# Patient Record
Sex: FEMALE | Race: WHITE | NOT HISPANIC OR LATINO | Employment: OTHER | URBAN - METROPOLITAN AREA
[De-identification: names, ages, dates, MRNs, and addresses within clinical notes are randomized per-mention and may not be internally consistent; named-entity substitution may affect disease eponyms.]

---

## 2017-04-06 ENCOUNTER — TELEPHONE (OUTPATIENT)
Dept: INTERNAL MEDICINE | Facility: CLINIC | Age: 82
End: 2017-04-06

## 2017-04-06 ENCOUNTER — HOSPITAL ENCOUNTER (OUTPATIENT)
Dept: RADIOLOGY | Facility: HOSPITAL | Age: 82
Discharge: HOME | End: 2017-04-06
Attending: INTERNAL MEDICINE
Payer: MEDICARE

## 2017-04-06 ENCOUNTER — OFFICE VISIT (OUTPATIENT)
Dept: INTERNAL MEDICINE | Facility: CLINIC | Age: 82
End: 2017-04-06
Payer: MEDICARE

## 2017-04-06 VITALS
BODY MASS INDEX: 31.79 KG/M2 | HEIGHT: 63 IN | DIASTOLIC BLOOD PRESSURE: 84 MMHG | TEMPERATURE: 98 F | RESPIRATION RATE: 20 BRPM | SYSTOLIC BLOOD PRESSURE: 146 MMHG | HEART RATE: 89 BPM | WEIGHT: 179.44 LBS

## 2017-04-06 DIAGNOSIS — I35.0 NONRHEUMATIC AORTIC VALVE STENOSIS: ICD-10-CM

## 2017-04-06 DIAGNOSIS — I10 ESSENTIAL HYPERTENSION: ICD-10-CM

## 2017-04-06 DIAGNOSIS — I77.9 BILATERAL CAROTID ARTERY DISEASE: ICD-10-CM

## 2017-04-06 DIAGNOSIS — R07.89 OTHER CHEST PAIN: ICD-10-CM

## 2017-04-06 DIAGNOSIS — R05.9 COUGH: ICD-10-CM

## 2017-04-06 DIAGNOSIS — J84.89 BOOP (BRONCHIOLITIS OBLITERANS WITH ORGANIZING PNEUMONIA): Primary | ICD-10-CM

## 2017-04-06 DIAGNOSIS — J84.89 BOOP (BRONCHIOLITIS OBLITERANS WITH ORGANIZING PNEUMONIA): ICD-10-CM

## 2017-04-06 PROCEDURE — 71020 XR CHEST PA AND LATERAL: CPT | Mod: TC,PO

## 2017-04-06 PROCEDURE — 93010 ELECTROCARDIOGRAM REPORT: CPT | Mod: ,,, | Performed by: INTERNAL MEDICINE

## 2017-04-06 PROCEDURE — 71020 XR CHEST PA AND LATERAL: CPT | Mod: 26,,, | Performed by: RADIOLOGY

## 2017-04-06 PROCEDURE — 99999 PR PBB SHADOW E&M-EST. PATIENT-LVL III: CPT | Mod: PBBFAC,,, | Performed by: INTERNAL MEDICINE

## 2017-04-06 PROCEDURE — 99214 OFFICE O/P EST MOD 30 MIN: CPT | Mod: S$PBB,,, | Performed by: INTERNAL MEDICINE

## 2017-04-06 NOTE — TELEPHONE ENCOUNTER
----- Message from Madelyn Becerra sent at 4/6/2017 11:47 AM CDT -----  Contact: daughter ramon  call  [phone]  Daughter is calling to give you the name of the medication that she is taking  repatha 140mg

## 2017-04-06 NOTE — PROGRESS NOTES
Subjective:       Patient ID: Damari Grant is a 85 y.o. female.    Chief Complaint: Cough (concerned she might have pneumonia)  HISTORY OF PRESENT ILLNESS:  The patient came in with her daughter for a cough   that began about a week ago and following that right lower back pain and a   feeling of tightness, she really says she does not have pain and then over the   last couple of days, she has had left anterior chest pain that comes and goes.    It is not severe and she feels that maybe from the coughing.  She has had   coronary disease in the past.  The patient is not coughing right now.  She feels   like this was a cold.  Her  also is sick.  She has had no sputum   production, no shortness of breath.    PHYSICAL EXAMINATION:  VITAL SIGNS:  Normal.  SKIN:  Shows no abnormality.  HEENT:  Clear.  CHEST:  Clear.  She has tenderness in the costochondral margins 3 and 4 on the   left that is consistent with her chest pain.  HEART:  She has no gallop, but she does have a grade 2 aortic outflow murmur and   she has carotid bruits, although it might be transmitted murmur, but she has   had an abnormal carotid ultrasound.    IMPRESSION:  1.  Recent viral upper respiratory tract infection.  2.  Low back pain, probably secondary to coughing.  There was no pain on the   examination I did.  3.  Chest pain, appears to be costochondritis from the coughing.  4.  Aortic stenosis.  5.  Carotid artery disease.  6.  History of bronchiolitis obliterans organizing pneumonia.    PLAN:  Did an EKG on her, which is normal.  She is getting a chest x-ray, which   is back showing no new infiltrate.  I did not change her medication.  I advised   her just to use over-the-counter medications for this.      JULIO CESAR/ADDIE  dd: 04/06/2017 13:52:37 (CDT)  td: 04/07/2017 07:11:07 (CDT)  Doc ID   #[id number]  Job ID #[id number]    CC:     Dict [id number]  HPI  Review of Systems   Constitutional: Negative.    HENT: Negative for congestion, hearing loss, sinus  pressure, sneezing, sore throat and voice change.    Eyes: Negative for visual disturbance.   Respiratory: Positive for cough. Negative for chest tightness and shortness of breath.    Cardiovascular: Positive for chest pain. Negative for palpitations and leg swelling.   Gastrointestinal: Negative.    Genitourinary: Negative for difficulty urinating, dysuria, flank pain, frequency, hematuria, menstrual problem, pelvic pain, urgency, vaginal bleeding and vaginal discharge.   Musculoskeletal: Positive for back pain. Negative for neck pain and neck stiffness.   Skin: Negative.    Neurological: Negative.  Negative for dizziness, seizures, light-headedness, numbness and headaches.   Psychiatric/Behavioral: Negative for agitation, behavioral problems, confusion and sleep disturbance. The patient is not nervous/anxious.        Objective:      Physical Exam   Constitutional: She is oriented to person, place, and time. She appears well-developed and well-nourished.   Eyes: EOM are normal. Pupils are equal, round, and reactive to light.   Neck: Normal range of motion. Neck supple. No JVD present. No thyromegaly present.   Cardiovascular: Normal rate, regular rhythm, normal heart sounds and intact distal pulses.  Exam reveals no gallop.    No murmur heard.  Pulmonary/Chest: Breath sounds normal. She has no wheezes. She has no rales.   Abdominal: Soft. Bowel sounds are normal. She exhibits no mass. There is no tenderness.   Musculoskeletal: Normal range of motion.   Lymphadenopathy:     She has no cervical adenopathy.   Neurological: She is alert and oriented to person, place, and time. She has normal reflexes. No cranial nerve deficit.   Skin: No rash noted. No erythema.   Psychiatric: She has a normal mood and affect. Judgment normal.       Assessment:       1. BOOP (bronchiolitis obliterans with organizing pneumonia)    2. Cough    3. Other chest pain    4. Essential hypertension    5. Bilateral carotid artery disease    6.  Nonrheumatic aortic valve stenosis        Plan:

## 2017-04-06 NOTE — TELEPHONE ENCOUNTER
She gets repatha injection every other week for cholesterol.  They are working on getting her in a program to cover the cost.

## 2017-04-18 ENCOUNTER — TELEPHONE (OUTPATIENT)
Dept: INTERNAL MEDICINE | Facility: CLINIC | Age: 82
End: 2017-04-18

## 2017-04-18 NOTE — TELEPHONE ENCOUNTER
----- Message from Abraham Nugent sent at 4/18/2017  7:03 AM CDT -----  Contact: Pt at [phone]  Pt requesting a call back regarding letter she received in mail from Dr. Ya.

## 2017-07-20 ENCOUNTER — TELEPHONE (OUTPATIENT)
Dept: INTERNAL MEDICINE | Facility: CLINIC | Age: 82
End: 2017-07-20

## 2017-07-20 NOTE — TELEPHONE ENCOUNTER
----- Message from Jaci Shine sent at 7/20/2017  1:59 PM CDT -----  Contact: self/[phone]  Patient called in regards needing to talk with Nikky (did not stated what for). Please call and advise.         Thank you!!!

## 2017-07-20 NOTE — TELEPHONE ENCOUNTER
Patient saw eye dr and said may h ave damage from diabetes or yuliana ypert.  She does have history of hypert.  She is wondering if ever been checked for diabetes.  I told her yearly and last test was normal.  She is not having any symptoms like she may have diabetes.    She will follow up with eye dr in a month and see us in September for yearly.

## 2017-08-02 ENCOUNTER — TELEPHONE (OUTPATIENT)
Dept: INTERNAL MEDICINE | Facility: CLINIC | Age: 82
End: 2017-08-02

## 2017-08-02 DIAGNOSIS — I10 ESSENTIAL HYPERTENSION: ICD-10-CM

## 2017-08-02 DIAGNOSIS — E78.9 LIPID DISORDER: ICD-10-CM

## 2017-08-02 DIAGNOSIS — Z00.00 ANNUAL PHYSICAL EXAM: Primary | ICD-10-CM

## 2017-08-02 NOTE — TELEPHONE ENCOUNTER
----- Message from Marly Spencer sent at 8/2/2017  7:50 AM CDT -----  Contact: fyi  Doctor appointment and lab have been scheduled.  Please link lab orders to the lab appointment.  Date of doctor appointment:  09/25/17  Physical or EP:  epp  Date of lab appointment:  09/25/17  Comments:

## 2017-09-25 ENCOUNTER — LAB VISIT (OUTPATIENT)
Dept: LAB | Facility: HOSPITAL | Age: 82
End: 2017-09-25
Attending: INTERNAL MEDICINE
Payer: MEDICARE

## 2017-09-25 ENCOUNTER — OFFICE VISIT (OUTPATIENT)
Dept: INTERNAL MEDICINE | Facility: CLINIC | Age: 82
End: 2017-09-25
Payer: MEDICARE

## 2017-09-25 VITALS
WEIGHT: 178.13 LBS | HEART RATE: 66 BPM | HEIGHT: 63 IN | SYSTOLIC BLOOD PRESSURE: 138 MMHG | TEMPERATURE: 98 F | DIASTOLIC BLOOD PRESSURE: 82 MMHG | BODY MASS INDEX: 31.56 KG/M2

## 2017-09-25 DIAGNOSIS — I10 ESSENTIAL HYPERTENSION: Primary | ICD-10-CM

## 2017-09-25 DIAGNOSIS — E78.9 LIPID DISORDER: ICD-10-CM

## 2017-09-25 DIAGNOSIS — I77.9 BILATERAL CAROTID ARTERY DISEASE: ICD-10-CM

## 2017-09-25 DIAGNOSIS — R20.0 ARM NUMBNESS LEFT: ICD-10-CM

## 2017-09-25 DIAGNOSIS — I10 ESSENTIAL HYPERTENSION: ICD-10-CM

## 2017-09-25 DIAGNOSIS — J84.89 BOOP (BRONCHIOLITIS OBLITERANS WITH ORGANIZING PNEUMONIA): ICD-10-CM

## 2017-09-25 LAB
ALBUMIN SERPL BCP-MCNC: 3.6 G/DL
ALP SERPL-CCNC: 65 U/L
ALT SERPL W/O P-5'-P-CCNC: 16 U/L
ANION GAP SERPL CALC-SCNC: 8 MMOL/L
AST SERPL-CCNC: 16 U/L
BASOPHILS # BLD AUTO: 0.02 K/UL
BASOPHILS NFR BLD: 0.4 %
BILIRUB SERPL-MCNC: 1.1 MG/DL
BUN SERPL-MCNC: 15 MG/DL
CALCIUM SERPL-MCNC: 9.6 MG/DL
CHLORIDE SERPL-SCNC: 104 MMOL/L
CHOLEST SERPL-MCNC: 159 MG/DL
CHOLEST/HDLC SERPL: 2.8 {RATIO}
CO2 SERPL-SCNC: 27 MMOL/L
CREAT SERPL-MCNC: 0.8 MG/DL
DIFFERENTIAL METHOD: NORMAL
EOSINOPHIL # BLD AUTO: 0.1 K/UL
EOSINOPHIL NFR BLD: 2.5 %
ERYTHROCYTE [DISTWIDTH] IN BLOOD BY AUTOMATED COUNT: 13.1 %
EST. GFR  (AFRICAN AMERICAN): >60 ML/MIN/1.73 M^2
EST. GFR  (NON AFRICAN AMERICAN): >60 ML/MIN/1.73 M^2
GLUCOSE SERPL-MCNC: 114 MG/DL
HCT VFR BLD AUTO: 44.1 %
HDLC SERPL-MCNC: 56 MG/DL
HDLC SERPL: 35.2 %
HGB BLD-MCNC: 14.5 G/DL
LDLC SERPL CALC-MCNC: 79.8 MG/DL
LYMPHOCYTES # BLD AUTO: 1.7 K/UL
LYMPHOCYTES NFR BLD: 33.9 %
MCH RBC QN AUTO: 29.7 PG
MCHC RBC AUTO-ENTMCNC: 32.9 G/DL
MCV RBC AUTO: 90 FL
MONOCYTES # BLD AUTO: 0.4 K/UL
MONOCYTES NFR BLD: 7.6 %
NEUTROPHILS # BLD AUTO: 2.8 K/UL
NEUTROPHILS NFR BLD: 55.4 %
NONHDLC SERPL-MCNC: 103 MG/DL
PLATELET # BLD AUTO: 228 K/UL
PMV BLD AUTO: 12.4 FL
POTASSIUM SERPL-SCNC: 3.6 MMOL/L
PROT SERPL-MCNC: 7.2 G/DL
RBC # BLD AUTO: 4.88 M/UL
SODIUM SERPL-SCNC: 139 MMOL/L
T4 FREE SERPL-MCNC: 0.88 NG/DL
TRIGL SERPL-MCNC: 116 MG/DL
TSH SERPL DL<=0.005 MIU/L-ACNC: 2.98 UIU/ML
WBC # BLD AUTO: 5.1 K/UL

## 2017-09-25 PROCEDURE — 80061 LIPID PANEL: CPT

## 2017-09-25 PROCEDURE — 84439 ASSAY OF FREE THYROXINE: CPT

## 2017-09-25 PROCEDURE — 99213 OFFICE O/P EST LOW 20 MIN: CPT | Mod: PBBFAC,PO | Performed by: INTERNAL MEDICINE

## 2017-09-25 PROCEDURE — 99999 PR PBB SHADOW E&M-EST. PATIENT-LVL III: CPT | Mod: PBBFAC,,, | Performed by: INTERNAL MEDICINE

## 2017-09-25 PROCEDURE — 36415 COLL VENOUS BLD VENIPUNCTURE: CPT | Mod: PO

## 2017-09-25 PROCEDURE — 1159F MED LIST DOCD IN RCRD: CPT | Mod: ,,, | Performed by: INTERNAL MEDICINE

## 2017-09-25 PROCEDURE — 84443 ASSAY THYROID STIM HORMONE: CPT

## 2017-09-25 PROCEDURE — 1126F AMNT PAIN NOTED NONE PRSNT: CPT | Mod: ,,, | Performed by: INTERNAL MEDICINE

## 2017-09-25 PROCEDURE — 99214 OFFICE O/P EST MOD 30 MIN: CPT | Mod: S$PBB,,, | Performed by: INTERNAL MEDICINE

## 2017-09-25 PROCEDURE — 80053 COMPREHEN METABOLIC PANEL: CPT

## 2017-09-25 PROCEDURE — 85025 COMPLETE CBC W/AUTO DIFF WBC: CPT

## 2017-09-25 NOTE — PROGRESS NOTES
Subjective:       Patient ID: Damari Grant is a 86 y.o. female.    Chief Complaint: Annual Exam  HISTORY OF PRESENT ILLNESS:  An 86-year-old white female comes in with her   daughter who stayed with her through the exam.  The only real complaint she has   is burning in her left arm for the last week without any history of injury.  She   has had problems in the past with numbness in her left arm and when I asked   her, she said that she does still have that on occasion.  The patient had a mole   removed from her left cheek by Dr. Bailey recently.  She does not have the   report of the results.  She sees a cardiologist at [facility], Dr. Langley   and had thorough workup to see whether coronary disease is stable and it was.    He told her arm problems are not from her heart.  She also sees ENT, Dr. Mosqueda.  She used to see gynecologist but has not for the last two years.  The   patient in addition to her current medicines are on Praluent for hyperlipidemia   because she does not tolerate statins.    The patient had lab work done on the day of his visit.  She does not have   results.    PHYSICAL EXAMINATION:  VITAL SIGNS:  Normal.  SKIN:  Fair, healthy, slightly irregular.  She has a scab on her left chin from   where the molars are removed.  HEENT:  Clear.  She has hearing aids.  NECK:  Shows no stiffness or adenopathy.  CHEST:  Clear with good breath sounds.  There is no wheezing, rhonchi, or rales.  HEART:  There is grade 3 aortic outflow murmur that radiates.  ABDOMEN:  Obese, nontender, no organomegaly.  EXTREMITIES:  Show normal muscles, joints, pulses.  No edema.  NEUROLOGIC:  Normal.  Her left upper arm, she has intact sensation, burning   increases a little bit by rotation of her neck towards that side.    IMPRESSION:  1.  Left arm burning, likely from an irritated nerve in her neck.  I have told   her to try ibuprofen for that.  2.  Known coronary artery disease, apparently stable.  3.   Hyperlipidemia on injectable medication.  4.  Recent removal of left mole on her cheek, pending.  5.  Aortic stenosis.  I will let her know when we get the results of the lab and   lab should be sent to Dr. Langley.      JULIO CESAR/ADDIE  dd: 09/25/2017 09:19:22 (CDT)  td: 09/25/2017 22:32:29 (CDT)  Doc ID   #[id number]  Job ID #[id number]    CC:     Dict [id number]  HPI  Review of Systems   Constitutional: Positive for appetite change and fatigue.   HENT: Negative for congestion, hearing loss, sinus pressure, sneezing, sore throat and voice change.    Eyes: Negative for visual disturbance.   Respiratory: Positive for shortness of breath. Negative for cough and chest tightness.    Cardiovascular: Negative.  Negative for chest pain, palpitations and leg swelling.   Gastrointestinal: Negative.    Genitourinary: Negative for difficulty urinating, dysuria, flank pain, frequency, hematuria, menstrual problem, pelvic pain, urgency, vaginal bleeding and vaginal discharge.   Musculoskeletal: Negative.  Negative for neck pain and neck stiffness.   Skin: Negative.    Neurological: Positive for numbness. Negative for dizziness, seizures, light-headedness and headaches.   Psychiatric/Behavioral: Negative for agitation, behavioral problems, confusion and sleep disturbance. The patient is not nervous/anxious.        Objective:      Physical Exam   Constitutional: She is oriented to person, place, and time. She appears well-developed and well-nourished.   Eyes: EOM are normal. Pupils are equal, round, and reactive to light.   Neck: Normal range of motion. Neck supple. No JVD present. No thyromegaly present.   Cardiovascular: Normal rate, regular rhythm and intact distal pulses.  Exam reveals no gallop.    Murmur heard.  Pulmonary/Chest: Breath sounds normal. She has no wheezes. She has no rales.   Abdominal: Soft. Bowel sounds are normal. She exhibits no mass. There is no tenderness.   Musculoskeletal: Normal range of motion.   Lymphadenopathy:      She has no cervical adenopathy.   Neurological: She is alert and oriented to person, place, and time. She has normal reflexes. No cranial nerve deficit.   Skin: No rash noted. No erythema.   Psychiatric: She has a normal mood and affect. Judgment normal.       Assessment:       1. Essential hypertension    2. Lipid disorder    3. BOOP (bronchiolitis obliterans with organizing pneumonia)    4. Bilateral carotid artery disease    5. Arm numbness left        Plan:

## 2017-11-06 ENCOUNTER — OFFICE VISIT (OUTPATIENT)
Dept: INTERNAL MEDICINE | Facility: CLINIC | Age: 82
End: 2017-11-06
Payer: MEDICARE

## 2017-11-06 VITALS
HEART RATE: 80 BPM | WEIGHT: 176.13 LBS | RESPIRATION RATE: 16 BRPM | DIASTOLIC BLOOD PRESSURE: 77 MMHG | HEIGHT: 65 IN | BODY MASS INDEX: 29.34 KG/M2 | TEMPERATURE: 98 F | SYSTOLIC BLOOD PRESSURE: 120 MMHG

## 2017-11-06 DIAGNOSIS — I77.9 BILATERAL CAROTID ARTERY DISEASE: ICD-10-CM

## 2017-11-06 DIAGNOSIS — J40 BRONCHITIS: Primary | ICD-10-CM

## 2017-11-06 DIAGNOSIS — J84.89 BOOP (BRONCHIOLITIS OBLITERANS WITH ORGANIZING PNEUMONIA): ICD-10-CM

## 2017-11-06 DIAGNOSIS — J01.40 ACUTE NON-RECURRENT PANSINUSITIS: ICD-10-CM

## 2017-11-06 DIAGNOSIS — R13.14 PHARYNGOESOPHAGEAL DYSPHAGIA: ICD-10-CM

## 2017-11-06 PROCEDURE — 99214 OFFICE O/P EST MOD 30 MIN: CPT | Mod: S$PBB,,, | Performed by: INTERNAL MEDICINE

## 2017-11-06 PROCEDURE — 99213 OFFICE O/P EST LOW 20 MIN: CPT | Mod: PBBFAC,PO,25 | Performed by: INTERNAL MEDICINE

## 2017-11-06 PROCEDURE — 99999 PR PBB SHADOW E&M-EST. PATIENT-LVL III: CPT | Mod: PBBFAC,,, | Performed by: INTERNAL MEDICINE

## 2017-11-06 PROCEDURE — 96372 THER/PROPH/DIAG INJ SC/IM: CPT | Mod: PBBFAC,PO

## 2017-11-06 RX ORDER — AZITHROMYCIN 250 MG/1
TABLET, FILM COATED ORAL
Qty: 6 TABLET | Refills: 0 | Status: SHIPPED | OUTPATIENT
Start: 2017-11-06 | End: 2018-09-25

## 2017-11-06 RX ORDER — TRIAMCINOLONE ACETONIDE 40 MG/ML
40 INJECTION, SUSPENSION INTRA-ARTICULAR; INTRAMUSCULAR ONCE
Status: COMPLETED | OUTPATIENT
Start: 2017-11-06 | End: 2017-11-06

## 2017-11-06 RX ADMIN — TRIAMCINOLONE ACETONIDE 40 MG: 40 INJECTION, SUSPENSION INTRA-ARTICULAR; INTRAMUSCULAR at 08:11

## 2017-11-06 NOTE — PROGRESS NOTES
Subjective:       Patient ID: Damari Grant is a 86 y.o. female.    Chief Complaint: Cough and Back Pain (lower)  An 86-year-old white female well known to me, who has come in with three days of   congestion, shortness of breath, wheezing, with minimal sputum production.  The   patient has had a history in the past of BOOP and for that was followed by Dr. Barahona, who is a pulmonary doctor at [facility].  The patient has not   had any problems with that recently.  The patient also has a long history of   food occasionally sticking in her esophagus.  She has been evaluated by GI for   that.  It is giving her a little more problem now that she has got good   respiratory in.    PHYSICAL EXAMINATION:  VITAL SIGNS:  Normal including temperature.  SKIN:  Fair and healthy.  No rash.  HEENT:  Clear.  CHEST:  Clear.  I hear no wheezing or rales.  The heart rhythm is regular.  ABDOMEN:  Nontender.    IMPRESSION:  1.  Asthmatic bronchitis.  2.  History of BOOP.  3.  Nonrecurrent pansinusitis.  4.  Dysphagia, which she has gong to continue when she will see GI at [facility].  I have placed her on a Z-BOLA, gave her a shot of Kenalog 40 IM.  I   did not do any x-rays.      JDC/HN  dd: 11/19/2017 22:13:21 (CST)  td: 11/19/2017 23:59:34 (CST)  Doc ID   #[id number]  Job ID #[id number]    CC:     Dict  [id number]  HPI  Review of Systems   Constitutional: Negative.    HENT: Positive for congestion, postnasal drip, sinus pressure and sneezing. Negative for hearing loss, sore throat and voice change.    Eyes: Negative for visual disturbance.   Respiratory: Positive for cough and shortness of breath. Negative for chest tightness.    Cardiovascular: Negative.  Negative for chest pain, palpitations and leg swelling.   Gastrointestinal: Negative.    Genitourinary: Negative for difficulty urinating, dysuria, flank pain, frequency, hematuria, menstrual problem, pelvic pain, urgency, vaginal bleeding and vaginal discharge.    Musculoskeletal: Negative.  Negative for neck pain and neck stiffness.   Skin: Negative.    Neurological: Negative.  Negative for dizziness, seizures, light-headedness, numbness and headaches.   Psychiatric/Behavioral: Negative for agitation, behavioral problems, confusion and sleep disturbance. The patient is not nervous/anxious.        Objective:      Physical Exam   Constitutional: She is oriented to person, place, and time. She appears well-developed and well-nourished.   Eyes: EOM are normal. Pupils are equal, round, and reactive to light.   Neck: Normal range of motion. Neck supple. No JVD present. No thyromegaly present.   Cardiovascular: Normal rate, regular rhythm, normal heart sounds and intact distal pulses.  Exam reveals no gallop.    No murmur heard.  Pulmonary/Chest: Breath sounds normal. She has no wheezes. She has no rales.   Abdominal: Soft. Bowel sounds are normal. She exhibits no mass. There is no tenderness.   Musculoskeletal: Normal range of motion.   Lymphadenopathy:     She has no cervical adenopathy.   Neurological: She is alert and oriented to person, place, and time. She has normal reflexes. No cranial nerve deficit.   Skin: No rash noted. No erythema.   Psychiatric: She has a normal mood and affect. Judgment normal.       Assessment:       1. Bronchitis    2. Acute non-recurrent pansinusitis    3. BOOP (bronchiolitis obliterans with organizing pneumonia)    4. Bilateral carotid artery disease    5. Pharyngoesophageal dysphagia        Plan:

## 2017-11-06 NOTE — PATIENT INSTRUCTIONS
Take over the counter Mucinex 1 pill twice daily  Over the counter Saline Nasal Spray (ex: Ocean brand)  Several times a day- 1 squirt in each nostril.  Push Fluids/ water    Take over the counter Mucinex 1 pill twice daily  Over the counter Saline Nasal Spray (ex: Ocean brand)  Several times a day- 1 squirt in each nostril.  Push Fluids/ water

## 2018-06-06 ENCOUNTER — TELEPHONE (OUTPATIENT)
Dept: INTERNAL MEDICINE | Facility: CLINIC | Age: 83
End: 2018-06-06

## 2018-06-06 DIAGNOSIS — E78.49 OTHER HYPERLIPIDEMIA: Primary | ICD-10-CM

## 2018-06-06 DIAGNOSIS — I10 ESSENTIAL HYPERTENSION: ICD-10-CM

## 2018-06-06 DIAGNOSIS — R73.09 ABNORMAL GLUCOSE: ICD-10-CM

## 2018-06-06 NOTE — TELEPHONE ENCOUNTER
----- Message from Sherry Baldwin sent at 6/5/2018  1:17 PM CDT -----  Doctor appointment and lab have been scheduled.  Please link lab orders to the lab appointment.  Date of doctor appointment: 10/1    Physical or EP:  Physical  Date of lab appointment:  9/24  Comments:

## 2018-06-21 ENCOUNTER — TELEPHONE (OUTPATIENT)
Dept: INTERNAL MEDICINE | Facility: CLINIC | Age: 83
End: 2018-06-21

## 2018-06-21 DIAGNOSIS — J84.89 BOOP (BRONCHIOLITIS OBLITERANS WITH ORGANIZING PNEUMONIA): Primary | ICD-10-CM

## 2018-06-21 DIAGNOSIS — M85.9 DISORDER OF BONE DENSITY AND STRUCTURE, UNSPECIFIED: ICD-10-CM

## 2018-06-21 DIAGNOSIS — M54.50 LOW BACK PAIN WITHOUT SCIATICA, UNSPECIFIED BACK PAIN LATERALITY, UNSPECIFIED CHRONICITY: ICD-10-CM

## 2018-06-21 NOTE — TELEPHONE ENCOUNTER
----- Message from Susy Roger sent at 6/21/2018  8:11 AM CDT -----  Contact: Pt [phone]  Pt would like a call back from the nurse regarding something she would like to discuss with Nikky.

## 2018-06-21 NOTE — TELEPHONE ENCOUNTER
She usually gets a bmd with dr ma/chevy.  She hasn't seen her in 2 years and wasn't planning on going back to her.    Had back problems recently and wondering if can get  another bmd.  Last one 11 years ago at [facility]    Ok to order? She wants to get done at our facility if you are ok with ordering.    Please advise.  I told her i'd let her know.    Thanks karan

## 2018-06-29 ENCOUNTER — APPOINTMENT (OUTPATIENT)
Dept: RADIOLOGY | Facility: CLINIC | Age: 83
End: 2018-06-29
Attending: INTERNAL MEDICINE
Payer: MEDICARE

## 2018-06-29 DIAGNOSIS — J84.89 BOOP (BRONCHIOLITIS OBLITERANS WITH ORGANIZING PNEUMONIA): ICD-10-CM

## 2018-06-29 DIAGNOSIS — M54.50 LOW BACK PAIN WITHOUT SCIATICA, UNSPECIFIED BACK PAIN LATERALITY, UNSPECIFIED CHRONICITY: ICD-10-CM

## 2018-06-29 DIAGNOSIS — M85.9 DISORDER OF BONE DENSITY AND STRUCTURE, UNSPECIFIED: ICD-10-CM

## 2018-06-29 PROCEDURE — 77081 DXA BONE DENSITY APPENDICULR: CPT | Mod: TC,PO

## 2018-06-29 PROCEDURE — 77081 DXA BONE DENSITY APPENDICULR: CPT | Mod: 26,,, | Performed by: INTERNAL MEDICINE

## 2018-07-06 ENCOUNTER — TELEPHONE (OUTPATIENT)
Dept: INTERNAL MEDICINE | Facility: CLINIC | Age: 83
End: 2018-07-06

## 2018-07-06 NOTE — TELEPHONE ENCOUNTER
Calling for bmd results done  6/29.  See results below:  What should I report?    Osteoporosis.    RECOMMENDATIONS of [facility]:    1.  Calcium 4550-2793 mg daily and vitamin D 800-1000 units daily, adequate exercise.    2.  If clinically appropriate, consider bisphosphonates (eg. alendronate, risedronate, zolendronic acid), denosumab, or teraparatide.    3.  Repeat BMD in 2 years    EXPLANATION OF RESULTS:    The T-score compares these results to the average bone density of a 20 year-old of the same gender. The Z-score compares this result to the average bone density to people of the same age, gender, and race. The amounts indicate the number of standard deviations above or below the mean.    * Osteoporosis is generally defined as having a T-score between less than or equal to -2.5.    * Osteopenia is generally defined as having a T-score between -1.0 and -2.5.    * The normal range is generally defined as having a t-score greater than or equal to -1.0.    * Calculated FRAX scores for fracture risk prediction may not be accurate in the setting of certain clinical factors such as pharmacologic therapy for osteoporosis, prior fragility fractures, high dose glucocorticoid use etc.      Electronically signed by: Elissa Resendiz MD  Date: 07/06/2018  Time: 08:42

## 2018-07-06 NOTE — TELEPHONE ENCOUNTER
----- Message from Toni Osuna sent at 7/6/2018  1:38 PM CDT -----  Contact: Self [phone]  Pt would like to speak to the nurse, did not want to give many details.

## 2018-07-07 NOTE — TELEPHONE ENCOUNTER
SHE HAS WEAKENED BONES, OSTEOPENIA.  sHE SHOULLD BE ON CALCIUMA ND VITAMIN d COMBINATION AS OUTLINED.  IT ALSO MIGHT BE WISE FOR HER TO GET ON THE ONCE MONTHLY FOSAMAX

## 2018-07-09 NOTE — TELEPHONE ENCOUNTER
I gave her dr's comments.    She says she tried fosamax years ago and didn't tolerate and stopped.  Doesn't remember problem.  I added to allergy list and told dr yung.    I also mailed results w/ recommendation high lighted.

## 2018-07-23 ENCOUNTER — TELEPHONE (OUTPATIENT)
Dept: INTERNAL MEDICINE | Facility: CLINIC | Age: 83
End: 2018-07-23

## 2018-07-23 NOTE — TELEPHONE ENCOUNTER
She is wondering if needed mammo .  I told her we will discuss that at visit in September since she is now 87.

## 2018-07-23 NOTE — TELEPHONE ENCOUNTER
----- Message from Cecy Danielle sent at 7/23/2018 11:13 AM CDT -----  Contact: self [phone]  Patient would like to speak to nurse, refuse to give details     Please advise

## 2018-07-29 ENCOUNTER — TELEPHONE (OUTPATIENT)
Dept: INTERNAL MEDICINE | Facility: CLINIC | Age: 83
End: 2018-07-29

## 2018-07-29 NOTE — TELEPHONE ENCOUNTER
Her bone density is somewhat low and she is classified as osteoporosis.  She should be on high dose vit D taking it bid and calcium hi potency daily.

## 2018-09-24 ENCOUNTER — LAB VISIT (OUTPATIENT)
Dept: LAB | Facility: HOSPITAL | Age: 83
End: 2018-09-24
Attending: INTERNAL MEDICINE
Payer: MEDICARE

## 2018-09-24 DIAGNOSIS — I10 ESSENTIAL HYPERTENSION: ICD-10-CM

## 2018-09-24 DIAGNOSIS — E78.49 OTHER HYPERLIPIDEMIA: ICD-10-CM

## 2018-09-24 DIAGNOSIS — R73.09 ABNORMAL GLUCOSE: ICD-10-CM

## 2018-09-24 LAB
ALBUMIN SERPL BCP-MCNC: 3.8 G/DL
ALP SERPL-CCNC: 61 U/L
ALT SERPL W/O P-5'-P-CCNC: 18 U/L
ANION GAP SERPL CALC-SCNC: 8 MMOL/L
AST SERPL-CCNC: 20 U/L
BASOPHILS # BLD AUTO: 0.03 K/UL
BASOPHILS NFR BLD: 0.6 %
BILIRUB SERPL-MCNC: 0.9 MG/DL
BUN SERPL-MCNC: 13 MG/DL
CALCIUM SERPL-MCNC: 10.2 MG/DL
CHLORIDE SERPL-SCNC: 105 MMOL/L
CHOLEST SERPL-MCNC: 165 MG/DL
CHOLEST/HDLC SERPL: 2.9 {RATIO}
CO2 SERPL-SCNC: 27 MMOL/L
CREAT SERPL-MCNC: 0.7 MG/DL
DIFFERENTIAL METHOD: ABNORMAL
EOSINOPHIL # BLD AUTO: 0.1 K/UL
EOSINOPHIL NFR BLD: 1.8 %
ERYTHROCYTE [DISTWIDTH] IN BLOOD BY AUTOMATED COUNT: 13.1 %
EST. GFR  (AFRICAN AMERICAN): >60 ML/MIN/1.73 M^2
EST. GFR  (NON AFRICAN AMERICAN): >60 ML/MIN/1.73 M^2
ESTIMATED AVG GLUCOSE: 105 MG/DL
GLUCOSE SERPL-MCNC: 104 MG/DL
HBA1C MFR BLD HPLC: 5.3 %
HCT VFR BLD AUTO: 43.9 %
HDLC SERPL-MCNC: 56 MG/DL
HDLC SERPL: 33.9 %
HGB BLD-MCNC: 13.8 G/DL
IMM GRANULOCYTES # BLD AUTO: 0.01 K/UL
IMM GRANULOCYTES NFR BLD AUTO: 0.2 %
LDLC SERPL CALC-MCNC: 84.8 MG/DL
LYMPHOCYTES # BLD AUTO: 1.6 K/UL
LYMPHOCYTES NFR BLD: 32.1 %
MCH RBC QN AUTO: 29 PG
MCHC RBC AUTO-ENTMCNC: 31.4 G/DL
MCV RBC AUTO: 92 FL
MONOCYTES # BLD AUTO: 0.4 K/UL
MONOCYTES NFR BLD: 8.2 %
NEUTROPHILS # BLD AUTO: 2.9 K/UL
NEUTROPHILS NFR BLD: 57.1 %
NONHDLC SERPL-MCNC: 109 MG/DL
NRBC BLD-RTO: 0 /100 WBC
PLATELET # BLD AUTO: 197 K/UL
PMV BLD AUTO: 12.9 FL
POTASSIUM SERPL-SCNC: 4.1 MMOL/L
PROT SERPL-MCNC: 7.1 G/DL
RBC # BLD AUTO: 4.76 M/UL
SODIUM SERPL-SCNC: 140 MMOL/L
T4 FREE SERPL-MCNC: 1 NG/DL
TRIGL SERPL-MCNC: 121 MG/DL
TSH SERPL DL<=0.005 MIU/L-ACNC: 3.4 UIU/ML
WBC # BLD AUTO: 5.01 K/UL

## 2018-09-24 PROCEDURE — 84443 ASSAY THYROID STIM HORMONE: CPT

## 2018-09-24 PROCEDURE — 83036 HEMOGLOBIN GLYCOSYLATED A1C: CPT

## 2018-09-24 PROCEDURE — 84439 ASSAY OF FREE THYROXINE: CPT

## 2018-09-24 PROCEDURE — 36415 COLL VENOUS BLD VENIPUNCTURE: CPT | Mod: PO

## 2018-09-24 PROCEDURE — 80053 COMPREHEN METABOLIC PANEL: CPT

## 2018-09-24 PROCEDURE — 85025 COMPLETE CBC W/AUTO DIFF WBC: CPT

## 2018-09-24 PROCEDURE — 80061 LIPID PANEL: CPT

## 2018-09-25 ENCOUNTER — OFFICE VISIT (OUTPATIENT)
Dept: INTERNAL MEDICINE | Facility: CLINIC | Age: 83
End: 2018-09-25
Payer: MEDICARE

## 2018-09-25 VITALS
DIASTOLIC BLOOD PRESSURE: 64 MMHG | SYSTOLIC BLOOD PRESSURE: 122 MMHG | TEMPERATURE: 99 F | WEIGHT: 175.06 LBS | HEART RATE: 78 BPM | HEIGHT: 62 IN | BODY MASS INDEX: 32.22 KG/M2

## 2018-09-25 DIAGNOSIS — R53.82 CHRONIC FATIGUE: Primary | ICD-10-CM

## 2018-09-25 DIAGNOSIS — I35.0 NONRHEUMATIC AORTIC VALVE STENOSIS: ICD-10-CM

## 2018-09-25 DIAGNOSIS — M54.50 LOW BACK PAIN WITHOUT SCIATICA, UNSPECIFIED BACK PAIN LATERALITY, UNSPECIFIED CHRONICITY: ICD-10-CM

## 2018-09-25 DIAGNOSIS — E78.9 LIPID DISORDER: ICD-10-CM

## 2018-09-25 DIAGNOSIS — J84.89 BOOP (BRONCHIOLITIS OBLITERANS WITH ORGANIZING PNEUMONIA): ICD-10-CM

## 2018-09-25 DIAGNOSIS — I77.9 BILATERAL CAROTID ARTERY DISEASE: ICD-10-CM

## 2018-09-25 DIAGNOSIS — I10 ESSENTIAL HYPERTENSION: ICD-10-CM

## 2018-09-25 PROCEDURE — 99214 OFFICE O/P EST MOD 30 MIN: CPT | Mod: S$PBB,,, | Performed by: INTERNAL MEDICINE

## 2018-09-25 PROCEDURE — 99213 OFFICE O/P EST LOW 20 MIN: CPT | Mod: PBBFAC,PO | Performed by: INTERNAL MEDICINE

## 2018-09-25 PROCEDURE — 99999 PR PBB SHADOW E&M-EST. PATIENT-LVL III: CPT | Mod: PBBFAC,,, | Performed by: INTERNAL MEDICINE

## 2018-09-25 RX ORDER — VITAMIN E 268 MG
400 CAPSULE ORAL DAILY
COMMUNITY
End: 2019-09-17

## 2018-09-25 NOTE — PROGRESS NOTES
Subjective:       Patient ID: Damari Grant is a 87 y.o. female.    Chief Complaint: Annual Exam (review labs)  The patient was seen for annual review.    HISTORY OF PRESENT ILLNESS:  The patient has had several problems since I had   last seen her.  She has been diagnosed as having a macular puckering by Dr. Boyer.  She has been seeing a chiropractor for right lower back pain, then went   to [facility] who referred her to Filippo Valadez for pain management.    She has required four shots since April and has now got reasonable relief of her   pain.    The patient complains of chronic tiredness, awakens tired.  She has bad dreams   and has for some time.  She also has had problems with pain in her shoulder on   the right side and is seeing [facility] for that.  She sees   gynecologist, Dr. Jefferson, Dr. Melendez for Cardiology, Dr. Shahid for ENT.    The patient has already seen Dr. Jefferson, had her bone density and mammogram   done.    The final complaint is left foot pain at the base of the second toe of the left   foot with walking, but not when she is off of that.    PHYSICAL EXAMINATION:  VITAL SIGNS:  Normal.  SKIN:  Fair and healthy.  HEENT:  Clear.  SKIN:  The skin is very dry and very fair.  NECK:  Shows no adenopathy or thyroid enlargement.  CHEST:  She has a bruit, but has a loud aortic murmur.  HEART:  There is a grade 3 aortic outflow murmur, which is old, radiating to her   neck.  CHEST:  Clear.  ABDOMEN:  Nontender without any organomegaly.  She is slightly obese.  EXTREMITIES:  Show normal muscles, joints, pulses except she has arthritic   changes consistent with her age.  NEUROLOGIC:  Appears normal.    IMPRESSION:  1.  Aortic stenosis.  2.  History of BOOP, currently not giving her any problems.  3.  Bilateral carotid disease.  4.  Hypertension, controlled.  5.  Lumbar radiculopathy, currently controlled with Pain Management.  6.  Aortic valve disease, seeing Cardiology.  7.   Plantar fasciitis, left second toe and I have told her to get inserts for   her shoes.  8.  Puckering of her macula seen by Dr. Boyer.  I will see her again if all is   well in one year.      JDC/HN  dd: 09/27/2018 21:52:17 (CDT)  td: 09/28/2018 11:19:51 (CDT)  Doc ID   #[id number]  Job ID #[id number]    CC:     Dict [id number]  HPI  Review of Systems   Constitutional: Positive for appetite change.   HENT: Negative.  Negative for congestion, hearing loss, sinus pressure, sneezing, sore throat and voice change.    Eyes: Positive for discharge. Negative for visual disturbance.   Respiratory: Negative.  Negative for cough, chest tightness and shortness of breath.    Cardiovascular: Negative.  Negative for chest pain, palpitations and leg swelling.   Gastrointestinal: Negative.    Endocrine: Negative.    Genitourinary: Negative for difficulty urinating, dysuria, flank pain, frequency, hematuria, menstrual problem, pelvic pain, urgency, vaginal bleeding and vaginal discharge.   Musculoskeletal: Positive for arthralgias. Negative for neck pain and neck stiffness.   Skin: Negative.    Allergic/Immunologic: Negative.    Neurological: Negative.  Negative for dizziness, seizures, light-headedness, numbness and headaches.   Hematological: Negative.    Psychiatric/Behavioral: Positive for agitation and sleep disturbance. Negative for behavioral problems and confusion. The patient is nervous/anxious.        Objective:      Physical Exam   Constitutional: She is oriented to person, place, and time. She appears well-developed and well-nourished.   Eyes: EOM are normal. Pupils are equal, round, and reactive to light.   Neck: Normal range of motion. Neck supple. No JVD present. No thyromegaly present.   Cardiovascular: Normal rate, regular rhythm and intact distal pulses. Exam reveals no gallop.   Murmur heard.  Pulmonary/Chest: Breath sounds normal. She has no wheezes. She has no rales.   Abdominal: Soft. Bowel sounds are normal. She exhibits  no mass. There is no tenderness.   Musculoskeletal: Normal range of motion. She exhibits tenderness.   Lymphadenopathy:     She has no cervical adenopathy.   Neurological: She is alert and oriented to person, place, and time. She has normal reflexes. No cranial nerve deficit.   Skin: No rash noted. No erythema. There is pallor.   Psychiatric: She has a normal mood and affect. Judgment normal.       Assessment:       1. Chronic fatigue    2. BOOP (bronchiolitis obliterans with organizing pneumonia)    3. Low back pain without sciatica, unspecified back pain laterality, unspecified chronicity    4. Essential hypertension    5. Bilateral carotid artery disease    6. Lipid disorder    7. Nonrheumatic aortic valve stenosis        Plan:

## 2019-01-08 ENCOUNTER — TELEPHONE (OUTPATIENT)
Dept: INTERNAL MEDICINE | Facility: CLINIC | Age: 84
End: 2019-01-08

## 2019-01-08 NOTE — TELEPHONE ENCOUNTER
She didn't want to keep wellness visit with ms. Corrie fulton and wanted our opinion.  I told her that is optional.   We cancelled because he is so hard to get in with appt.

## 2019-01-08 NOTE — TELEPHONE ENCOUNTER
----- Message from Kassandra Beltrán sent at 1/8/2019  3:44 PM CST -----  Contact: Pt Mobile/Home [phone]   Patient would like a call back from you. I asked her what was the call about? she didn't tell me she said she have a question for you.

## 2019-01-15 ENCOUNTER — OFFICE VISIT (OUTPATIENT)
Dept: INTERNAL MEDICINE | Facility: CLINIC | Age: 84
End: 2019-01-15
Payer: MEDICARE

## 2019-01-15 ENCOUNTER — TELEPHONE (OUTPATIENT)
Dept: INTERNAL MEDICINE | Facility: CLINIC | Age: 84
End: 2019-01-15

## 2019-01-15 ENCOUNTER — HOSPITAL ENCOUNTER (OUTPATIENT)
Dept: RADIOLOGY | Facility: HOSPITAL | Age: 84
Discharge: HOME | End: 2019-01-15
Attending: INTERNAL MEDICINE
Payer: MEDICARE

## 2019-01-15 VITALS
HEART RATE: 78 BPM | SYSTOLIC BLOOD PRESSURE: 122 MMHG | RESPIRATION RATE: 16 BRPM | BODY MASS INDEX: 28.76 KG/M2 | TEMPERATURE: 98 F | DIASTOLIC BLOOD PRESSURE: 59 MMHG | WEIGHT: 172.63 LBS | HEIGHT: 65 IN

## 2019-01-15 DIAGNOSIS — I65.23 BILATERAL CAROTID ARTERY STENOSIS: ICD-10-CM

## 2019-01-15 DIAGNOSIS — R68.81 EARLY SATIETY: ICD-10-CM

## 2019-01-15 DIAGNOSIS — J84.89 BOOP (BRONCHIOLITIS OBLITERANS WITH ORGANIZING PNEUMONIA): ICD-10-CM

## 2019-01-15 DIAGNOSIS — I35.0 NONRHEUMATIC AORTIC VALVE STENOSIS: ICD-10-CM

## 2019-01-15 DIAGNOSIS — E78.9 LIPID DISORDER: ICD-10-CM

## 2019-01-15 DIAGNOSIS — R10.30 LOWER ABDOMINAL PAIN: ICD-10-CM

## 2019-01-15 DIAGNOSIS — K59.01 SLOW TRANSIT CONSTIPATION: ICD-10-CM

## 2019-01-15 DIAGNOSIS — I10 ESSENTIAL HYPERTENSION: ICD-10-CM

## 2019-01-15 DIAGNOSIS — R68.81 EARLY SATIETY: Primary | ICD-10-CM

## 2019-01-15 PROCEDURE — 99999 PR PBB SHADOW E&M-EST. PATIENT-LVL III: CPT | Mod: PBBFAC,,, | Performed by: INTERNAL MEDICINE

## 2019-01-15 PROCEDURE — 74019 XR ABDOMEN FLAT AND ERECT: ICD-10-PCS | Mod: 26,,, | Performed by: RADIOLOGY

## 2019-01-15 PROCEDURE — 99214 OFFICE O/P EST MOD 30 MIN: CPT | Mod: S$PBB,,, | Performed by: INTERNAL MEDICINE

## 2019-01-15 PROCEDURE — 74019 RADEX ABDOMEN 2 VIEWS: CPT | Mod: 26,,, | Performed by: RADIOLOGY

## 2019-01-15 PROCEDURE — 74019 RADEX ABDOMEN 2 VIEWS: CPT | Mod: TC,PO

## 2019-01-15 PROCEDURE — 99999 PR PBB SHADOW E&M-EST. PATIENT-LVL III: ICD-10-PCS | Mod: PBBFAC,,, | Performed by: INTERNAL MEDICINE

## 2019-01-15 PROCEDURE — 99213 OFFICE O/P EST LOW 20 MIN: CPT | Mod: PBBFAC,25,PO | Performed by: INTERNAL MEDICINE

## 2019-01-15 PROCEDURE — 99214 PR OFFICE/OUTPT VISIT, EST, LEVL IV, 30-39 MIN: ICD-10-PCS | Mod: S$PBB,,, | Performed by: INTERNAL MEDICINE

## 2019-01-15 NOTE — TELEPHONE ENCOUNTER
----- Message from Kana Ya MD sent at 1/15/2019  2:45 PM CST -----  The abdominal films show nothing bad, likely this is constipation

## 2019-01-15 NOTE — PROGRESS NOTES
Subjective:       Patient ID: Damari Grant is a 87 y.o. female.    Chief Complaint: gi issues  HISTORY OF PRESENT ILLNESS:  An 87-year-old white female coming in with lower   abdominal pain on and off for the last year.  She also has had early satiety,   complaining at times that she gets full quickly.  She eats a fairly normal diet.    She has had a workup done by her GI doctor, Dr. Delaney, four to five years   ago for the same problem that was negative, but I do not have those results   handy in our chart.  The patient has had about a 2-pound weight loss in the last   14 months.  The patient was accompanied by a family member.  She does not look   ill.    PHYSICAL EXAMINATION:  VITAL SIGNS:  Normal.  SKIN:  Fair and healthy.  No rash.  HEENT:  Clear.  CHEST:  Clear.  HEART:  Regular.  ABDOMEN:  Not distended.  Bowel sounds are active.  She has some discomfort in   the very lower part of her abdomen.    IMPRESSION AND PLAN:  1.  Early satiety.  2.  Constipation, slow transit.  3.  Lower abdominal pain, likely related to the above.  4.  History of BOOP, but she appears to have recovered from that.  5.  Hypertension.  6.  Aortic stenosis, mild.    The patient had a blood work and an abdominal x-ray ordered.  Blood work showed   normal CBC, chemistries, amylase and sed rate and her abdominal studies were   unremarkable.  Following the studies, she was advised to get on MiraLax and a   high-fiber diet and if her problems persisted, to let me know and we can look   into this further.  The patient had upper endoscopy done by Dr. Delaney in 2014   showing mild inflammatory changes.  Colonoscopy showed diverticulosis,   otherwise normal.      JULIO CESAR/HN  dd: 01/23/2019 20:38:12 (CST)  td: 01/24/2019 11:51:09 (CST)  Doc ID   #[id number]  Job ID #[id number]    CC:     Dict [id number]  HPI  Review of Systems   Constitutional: Positive for appetite change and fatigue.   HENT: Negative for congestion, hearing loss, sinus pressure,  sneezing, sore throat and voice change.    Eyes: Negative for visual disturbance.   Respiratory: Negative for cough, chest tightness and shortness of breath.    Cardiovascular: Negative.  Negative for chest pain, palpitations and leg swelling.   Gastrointestinal: Positive for abdominal pain and constipation.   Genitourinary: Negative for difficulty urinating, dysuria, flank pain, frequency, hematuria, menstrual problem, pelvic pain, urgency, vaginal bleeding and vaginal discharge.   Musculoskeletal: Negative.  Negative for neck pain and neck stiffness.   Skin: Negative.    Neurological: Negative.  Negative for dizziness, seizures, light-headedness, numbness and headaches.   Psychiatric/Behavioral: Negative for agitation, behavioral problems, confusion and sleep disturbance. The patient is not nervous/anxious.        Objective:      Physical Exam   Constitutional: She is oriented to person, place, and time. She appears well-developed and well-nourished.   Eyes: EOM are normal. Pupils are equal, round, and reactive to light.   Neck: Normal range of motion. Neck supple. No JVD present. No thyromegaly present.   Cardiovascular: Normal rate, regular rhythm, normal heart sounds and intact distal pulses. Exam reveals no gallop.   No murmur heard.  Pulmonary/Chest: Breath sounds normal. She has no wheezes. She has no rales.   Abdominal: Soft. Bowel sounds are normal. She exhibits no mass. There is no tenderness.   Musculoskeletal: Normal range of motion.   Lymphadenopathy:     She has no cervical adenopathy.   Neurological: She is alert and oriented to person, place, and time. She has normal reflexes. No cranial nerve deficit.   Skin: No rash noted. No erythema.   Psychiatric: She has a normal mood and affect. Judgment normal.       Assessment:       1. Early satiety    2. BOOP (bronchiolitis obliterans with organizing pneumonia)    3. Essential hypertension    4. Lipid disorder    5. Bilateral carotid artery stenosis    6.  Nonrheumatic aortic valve stenosis    7. Slow transit constipation    8. Lower abdominal pain        Plan:

## 2019-01-16 ENCOUNTER — LAB VISIT (OUTPATIENT)
Dept: LAB | Facility: HOSPITAL | Age: 84
End: 2019-01-16
Attending: INTERNAL MEDICINE
Payer: MEDICARE

## 2019-01-16 DIAGNOSIS — K59.01 SLOW TRANSIT CONSTIPATION: ICD-10-CM

## 2019-01-16 DIAGNOSIS — R68.81 EARLY SATIETY: ICD-10-CM

## 2019-01-16 DIAGNOSIS — J84.89 BOOP (BRONCHIOLITIS OBLITERANS WITH ORGANIZING PNEUMONIA): ICD-10-CM

## 2019-01-16 DIAGNOSIS — R10.30 LOWER ABDOMINAL PAIN: ICD-10-CM

## 2019-01-16 PROCEDURE — 87077 CULTURE AEROBIC IDENTIFY: CPT

## 2019-01-16 PROCEDURE — 87086 URINE CULTURE/COLONY COUNT: CPT

## 2019-01-16 PROCEDURE — 87186 SC STD MICRODIL/AGAR DIL: CPT

## 2019-01-16 PROCEDURE — 87088 URINE BACTERIA CULTURE: CPT

## 2019-01-18 LAB — BACTERIA UR CULT: NORMAL

## 2019-01-20 ENCOUNTER — TELEPHONE (OUTPATIENT)
Dept: INTERNAL MEDICINE | Facility: CLINIC | Age: 84
End: 2019-01-20

## 2019-01-21 NOTE — TELEPHONE ENCOUNTER
I called daughter to give your comments on urine culture.  She had a little  Burning problem over the weekend. you identified the area as colon at last visit  Per daughter.    See 2014 studies done by marzena (in media).    Doing fiber and miralax.  Today ok.   Should she do anything else?    Thanks karan

## 2019-01-22 NOTE — TELEPHONE ENCOUNTER
Daughter roselia told 's comments.  I sent a remind me msg to myself for 2 mos schedule u/a and culture repeat.

## 2019-02-13 ENCOUNTER — TELEPHONE (OUTPATIENT)
Dept: INTERNAL MEDICINE | Facility: CLINIC | Age: 84
End: 2019-02-13

## 2019-02-13 NOTE — TELEPHONE ENCOUNTER
I explained nation wide shortage.  She will see if her pharmacy offers it and if they need an order from us will call back.

## 2019-02-13 NOTE — TELEPHONE ENCOUNTER
----- Message from Gail Pickett sent at 2/13/2019  7:20 AM CST -----  Contact: Sujata/daughter/ [phone]  Patient would like a RX for the shingles shot. Please call and advise.

## 2019-05-29 ENCOUNTER — TELEPHONE (OUTPATIENT)
Dept: INTERNAL MEDICINE | Facility: CLINIC | Age: 84
End: 2019-05-29

## 2019-05-29 DIAGNOSIS — N39.0 URINARY TRACT INFECTION WITHOUT HEMATURIA, SITE UNSPECIFIED: Primary | ICD-10-CM

## 2019-05-29 NOTE — TELEPHONE ENCOUNTER
----- Message from Nikky Garcia MA sent at 1/22/2019  8:55 AM CST -----  Needs repeat urine an culture march 2019.    Call patient or daughter roselia

## 2019-05-29 NOTE — TELEPHONE ENCOUNTER
I reached daughter and told her about need for urine testing. (3 mos fol up)    ua and culture put in and she will  container and drop off at lab this week.

## 2019-05-31 ENCOUNTER — LAB VISIT (OUTPATIENT)
Dept: LAB | Facility: HOSPITAL | Age: 84
End: 2019-05-31
Attending: INTERNAL MEDICINE
Payer: MEDICARE

## 2019-05-31 DIAGNOSIS — N39.0 URINARY TRACT INFECTION WITHOUT HEMATURIA, SITE UNSPECIFIED: ICD-10-CM

## 2019-05-31 LAB
BACTERIA #/AREA URNS AUTO: ABNORMAL /HPF
BILIRUB UR QL STRIP: NEGATIVE
CLARITY UR REFRACT.AUTO: CLEAR
COLOR UR AUTO: ABNORMAL
GLUCOSE UR QL STRIP: NEGATIVE
HGB UR QL STRIP: ABNORMAL
KETONES UR QL STRIP: NEGATIVE
LEUKOCYTE ESTERASE UR QL STRIP: ABNORMAL
MICROSCOPIC COMMENT: ABNORMAL
NITRITE UR QL STRIP: NEGATIVE
NON-SQ EPI CELLS #/AREA URNS AUTO: 1 /HPF
PH UR STRIP: 6 [PH] (ref 5–8)
PROT UR QL STRIP: NEGATIVE
RBC #/AREA URNS AUTO: 1 /HPF (ref 0–4)
SP GR UR STRIP: 1 (ref 1–1.03)
SQUAMOUS #/AREA URNS AUTO: 1 /HPF
URN SPEC COLLECT METH UR: ABNORMAL
WBC #/AREA URNS AUTO: 6 /HPF (ref 0–5)

## 2019-05-31 PROCEDURE — 81001 URINALYSIS AUTO W/SCOPE: CPT

## 2019-05-31 PROCEDURE — 87086 URINE CULTURE/COLONY COUNT: CPT

## 2019-06-01 LAB — BACTERIA UR CULT: NORMAL

## 2019-08-13 ENCOUNTER — NURSE TRIAGE (OUTPATIENT)
Dept: ADMINISTRATIVE | Facility: CLINIC | Age: 84
End: 2019-08-13

## 2019-08-13 NOTE — TELEPHONE ENCOUNTER
Reason for Disposition   Shock suspected (e.g., cold/pale/clammy skin, too weak to stand)    Protocols used: ST VOMITING-A-OH    Sujata, Damari's daughter, called to say she can't keep anything down today, has vomited at least 3 times, and went back to bed after going to the bathroom the last time.  She states she is too weak to get up, and Sujata states this is not usual for her.  She has also complained of dizziness, light headedness. Damari is 88 years old, and Sujata states she uses [facility] for emergency care. Per [facility] triage protocol, recommend all 911 for immediate medical care of patient unable to ambulate due to weakness.  They will go to [facility] ED.  Message to Kana Ya MD, pcp. Please contact caller directly with any additional care advice.

## 2019-08-13 NOTE — TELEPHONE ENCOUNTER
I spoke to dr yung about this.  He agreed they should call 911 and get to hospital for eval and iv.    Daughter called and says mother refuses er, j ust wants to rest.  I told her dr recommendation and if not improving w/n few hours call 911 for transport to hospital and get her doing better to avoid dehydration.

## 2019-08-14 ENCOUNTER — TELEPHONE (OUTPATIENT)
Dept: INTERNAL MEDICINE | Facility: CLINIC | Age: 84
End: 2019-08-14

## 2019-08-14 NOTE — TELEPHONE ENCOUNTER
----- Message from Jolly Murray sent at 8/14/2019 12:31 PM CDT -----  Contact: daughter Damari  [phone]  Damari patient daughter called about her mother symptoms from yesterday starting again today dizzy,  lightheaded, nauseated,  But no vomiting as it was yesterday.   She had these symptoms yesterday but she got better.     I took call from daughter when triage nurse couldn't be reached at 12:40am today..    Ms. hobson was all better yesterday and symptoms returned this am with vomiting and dizziness.    I told her dr yung wanted her to go to er yesterday and would still feel same today.    BLAKE

## 2019-09-16 PROBLEM — I70.0 ATHEROSCLEROSIS OF AORTA: Status: ACTIVE | Noted: 2019-09-16

## 2019-09-16 PROBLEM — Z87.898 HISTORY OF SYNCOPE: Status: ACTIVE | Noted: 2019-09-16

## 2019-09-16 PROBLEM — Z91.81 RISK FOR FALLS: Status: ACTIVE | Noted: 2019-09-16

## 2019-09-17 ENCOUNTER — OFFICE VISIT (OUTPATIENT)
Dept: INTERNAL MEDICINE | Facility: CLINIC | Age: 84
End: 2019-09-17
Payer: MEDICARE

## 2019-09-17 ENCOUNTER — TELEPHONE (OUTPATIENT)
Dept: INTERNAL MEDICINE | Facility: CLINIC | Age: 84
End: 2019-09-17

## 2019-09-17 VITALS
DIASTOLIC BLOOD PRESSURE: 70 MMHG | SYSTOLIC BLOOD PRESSURE: 127 MMHG | OXYGEN SATURATION: 96 % | HEIGHT: 65 IN | HEART RATE: 73 BPM | RESPIRATION RATE: 15 BRPM | WEIGHT: 177.25 LBS | BODY MASS INDEX: 29.53 KG/M2

## 2019-09-17 DIAGNOSIS — I35.0 NONRHEUMATIC AORTIC VALVE STENOSIS: ICD-10-CM

## 2019-09-17 DIAGNOSIS — I10 ESSENTIAL HYPERTENSION: ICD-10-CM

## 2019-09-17 DIAGNOSIS — M54.50 BILATERAL LOW BACK PAIN WITHOUT SCIATICA, UNSPECIFIED CHRONICITY: ICD-10-CM

## 2019-09-17 DIAGNOSIS — H90.3 SENSORINEURAL HEARING LOSS (SNHL) OF BOTH EARS: ICD-10-CM

## 2019-09-17 DIAGNOSIS — K43.9 HERNIA OF ABDOMINAL WALL: ICD-10-CM

## 2019-09-17 DIAGNOSIS — R53.82 CHRONIC FATIGUE: ICD-10-CM

## 2019-09-17 DIAGNOSIS — Z87.898 HISTORY OF SYNCOPE: ICD-10-CM

## 2019-09-17 DIAGNOSIS — I77.9 CAROTID ARTERY DISEASE, UNSPECIFIED LATERALITY, UNSPECIFIED TYPE: ICD-10-CM

## 2019-09-17 DIAGNOSIS — Z91.81 RISK FOR FALLS: ICD-10-CM

## 2019-09-17 DIAGNOSIS — J84.89 BOOP (BRONCHIOLITIS OBLITERANS WITH ORGANIZING PNEUMONIA): ICD-10-CM

## 2019-09-17 DIAGNOSIS — I70.0 ATHEROSCLEROSIS OF AORTA: ICD-10-CM

## 2019-09-17 DIAGNOSIS — E78.9 LIPID DISORDER: ICD-10-CM

## 2019-09-17 DIAGNOSIS — Z00.00 ENCOUNTER FOR PREVENTIVE HEALTH EXAMINATION: Primary | ICD-10-CM

## 2019-09-17 DIAGNOSIS — K59.09 OTHER CONSTIPATION: ICD-10-CM

## 2019-09-17 DIAGNOSIS — G62.9 NEUROPATHY: ICD-10-CM

## 2019-09-17 PROBLEM — R13.14 PHARYNGOESOPHAGEAL DYSPHAGIA: Status: RESOLVED | Noted: 2017-11-06 | Resolved: 2019-09-17

## 2019-09-17 PROCEDURE — G0439 PR MEDICARE ANNUAL WELLNESS SUBSEQUENT VISIT: ICD-10-PCS | Mod: S$GLB,,, | Performed by: NURSE PRACTITIONER

## 2019-09-17 PROCEDURE — 99213 OFFICE O/P EST LOW 20 MIN: CPT | Mod: PBBFAC,PO | Performed by: NURSE PRACTITIONER

## 2019-09-17 PROCEDURE — 99999 PR PBB SHADOW E&M-EST. PATIENT-LVL III: CPT | Mod: PBBFAC,,, | Performed by: NURSE PRACTITIONER

## 2019-09-17 PROCEDURE — G0439 PPPS, SUBSEQ VISIT: HCPCS | Mod: S$GLB,,, | Performed by: NURSE PRACTITIONER

## 2019-09-17 PROCEDURE — 99999 PR PBB SHADOW E&M-EST. PATIENT-LVL III: ICD-10-PCS | Mod: PBBFAC,,, | Performed by: NURSE PRACTITIONER

## 2019-09-17 RX ORDER — ZINC GLUCONATE 100 MG
TABLET ORAL
COMMUNITY
End: 2020-11-20

## 2019-09-17 NOTE — PATIENT INSTRUCTIONS
Counseling and Referral of Other Preventative  (Italic type indicates deductible and co-insurance are waived)    Patient Name: Damari Grant  Today's Date: 9/17/2019    Health Maintenance       Date Due Completion Date    Pneumococcal Vaccine (65+ Low/Medium Risk) (2 of 2 - PPSV23) Check with PCP 10/10/2014    Influenza Vaccine (1) 09/26/2019 (Originally 9/1/2019)   10/12/2018    TETANUS VACCINE In future for injury   ---    Shingles Vaccine (1 of 2) cdc handout given   ---    Aspirin/Antiplatelet Therapy 01/15/2020   1/15/2019    Lipid Panel    Carotid u/s    Bone density scan     Mammogram- per Dr chávez Will be done with annual      6/29/2020 9/24/2018 7/28/2014 6/29/2018            No orders of the defined types were placed in this encounter.    The following information is provided to all patients.  This information is to help you find resources for any of the problems found today that may be affecting your health:                Living healthy guide: www.[facility].[place].[facility]      Understanding Diabetes: www.diabetes.org      Eating healthy: www.cdc.gov/healthyweight      [facility] home safety checklist: www.cdc.gov/steadi/patient.html      Agency on Aging: www.goea.[place].[facility]      Alcoholics anonymous (AA): www.aa.org      Physical Activity: www.loulou.nih.gov/ar7bphm      Tobacco use: www.quitwithusla.org     Exercises to Prevent Falls  Certain types of exercises may help make you less likely to fall. Try the ones below. Or do other exercises that your health care provider suggests. Depending on your health, you may need to start slowly. Don't let that stop you. Even small amounts of exercise can help you. Be sure to talk to your health care provider before starting any exercise program.       Improve balance  Many types of exercise can help improve balance. Galindo chi and yoga are good examples. Here's another one to try. You can do it anytime and almost anywhere.  · Stand next to a counter or solid  "support.  · Push yourself up onto your tiptoes.  · Hold for 5 seconds. If you start to lose your balance, hold on to the counter.  · Rest and repeat 5 times. Work up to holding for 20 to 30 seconds, if you can. Increase flexibility  Being more flexible makes it easier for you to move around safely. Try exercises like the seated hamstring stretch.  · Sit in a chair and put one foot on a stool.  · Straighten your leg and reach with both hands down either side of your leg. Reach as far down your leg as you can.  · Hold for about 20 seconds.  · Go back to the starting position. Then repeat 5 times. Switch legs. Build strength  "Resistance" exercises help build strength. You can do them without equipment. Or you can use weights, elastic bands, or special machines. One such exercise is called the biceps curl. You can hold a 1-pound weight or even a can of soup. Do this exercise at least 3 times a week. Strive for every day.  · Sit up straight in a chair.  · Keep your elbow close to your body and your wrist straight.  · Bend your arm, moving your hand up to your shoulder. Then slowly lower your arm.  · Repeat 5 times. Switch to the other arm.   Build your staying power  Aerobic exercises make your heart and lungs stronger so you can keep moving longer. Walking and swimming are two of the best types of exercises you can do. Using a stationary bike is great, too. Find an aerobic exercise that you enjoy. Start slowly and build up. Even 5 minutes is helpful. Aim for a goal of 30 minutes, at least 3 times a week. You don't have to do 30 minutes in 1 session. Break it up and walk a little throughout the day.  More helpful tips  · Start easy. Slowly work up to doing more.  · Talk with your health care provider about the best exercises for you.  · Call senior centers or health clubs about exercise programs.  · If needed, have a family member watch you walk every so often to check your stability.  · Exercise with a friend. Choose " an activity you both enjoy.  · Consider niecy chi or yoga to strengthen your balance.  · Try exercises that you can do anytime, anywhere. Here are 2 examples. Have someone with you when you first try these:  ¨ Practice walking by placing 1 foot right in front of the other.  ¨ Stand up and sit down 10 times. Repeat this throughout the day.   Date Last Reviewed: 6/13/2015  © 5285-0526 [employer]. [address] [place] [address], [place] [address]. All rights reserved. This information is not intended as a substitute for professional medical care. Always follow your healthcare professional's instructions.        Preventing Falls: Making Changes in Your Living Space  Is your living space filled with hazards that could cause you to fall? Changes can make you safer. They could even save your life. Take a careful look around your home. Change what you can on your own. Hire someone or ask friends or family to help with harder tasks.      Be sure to add a nonslip mat to the inside of your shower or bathtub. Always keep a nightlight on. Keep a clear path from your bed to the bathroom. Move items from higher shelves to lower ones.   Remove hazards  · Remove things that can trip you, like throw rugs, boxes, piles of paper, or cords.  · Nail down rugs or carpeting if you don't want to remove them.  · Don't store items on stairs.  · Keep walkways clear.  · Clean up spills right away.  · Replace glass tables with wooden ones. They're safer if you fall.  Add safety devices  · Add handrails to both sides of stairs.  · Buy a raised toilet seat.  · Add grab bars near the toilet and in the shower.  · Get grabbers to help you reach things and avoid climbing.  Improve lighting  · Add nightlights to halls, bedrooms, and bathrooms.   · Put light switches at the top and bottom of stairs.  · Be sure each room and flight of stairs has proper lighting.  · Use shades or curtains to cut glare from windows.  · Put flashlights in each room.  Replace burned-out bulbs.  · Get glowing light switches for room entrances.  Take other precautions  · Use nonskid floor wax.  · Buy a nonslip mat and a liquid soap dispenser for the shower.  · Tack down carpets or use slip-resistant backing.  · Put most-used items within easy reach.  · Add bright paint or tape on the top front edge of steps.  · Save big jobs, such as moving furniture or other heavy objects, for family or friends.  · Get professional help installing grab bars. They can be unsafe if not installed the right way.  Fix riskier rooms first  Don't tackle everything at once. Focus on one room at a time. The bathroom is a common spot for falls, so you may start there. Or start with a room you spend lots of time in, such as your bedroom. Make only a few changes at once. This will give you time to adjust to them.     Outside your home  You might arrange for these changes yourself, or you might need to talk to your  or homeowners' association about them.  · Have loose boards on porches or damaged stairs repaired.  · Have rough edges, holes, or large cracks in sidewalks or driveways repaired.  · Have hazards that could trip you, such as hoses or wan, removed.  · Use high-wattage light bulbs (100 or greater) near outside doors and stairs.  · Get handrails added to outside stairs. Have them extend beyond the bottom step.  · Get help in winter weather with ice or snow removal.   Date Last Reviewed: 6/12/2015  © 3312-4873 [employer]. [address] [place] [address], [place] [address]. All rights reserved. This information is not intended as a substitute for professional medical care. Always follow your healthcare professional's instructions.        Preventing Falls: Moving Safely Using a Cane or Walker     When using a cane, keep it away from your feet so you dont trip.     A walking aid, such as a cane or walker, can help you stay more independent and avoid falls. Remember to keep your  walking aid within easy reach when you're in a chair or in bed. And learn how to use it safely so you don't injure yourself. Be sure the cane or walker is the correct height. Hang your arm loosely at your side, and measure the distance from your wrist to the floor. The distance should be the same as the height of your cane or walker.  Using a cane  If you have a stronger side, hold the cane on the side of your stronger leg.  1. Get your balance.  2. Move the cane and your weaker leg forward.  3. Support your weight on both the cane and your weaker side.  4. Step with your stronger leg.  5. Start again from step 1.  Using a walker  1. Roll the walker (or lift it, if you're using one without wheels) forward about 12 inches.  2. Step forward with your weaker leg first.  3. Use the walker to help keep your balance.  4. Bring your other foot forward to the center of the walker.  5. Start again from step 1.  Helpful tips  · Check with your health care provider about the right walking aid to use. Ask about a walker with a seat attached.  · Check the tips of your cane or walker to make sure they have nonskid covers.  · Move slowly from room to room. Don't rush.  · Sit down to get dressed.  · Use a dixie pack or backpack to keep your hands free.  · Get help for jobs that mean climbing, even on a stepstool.  · Do not try going up or down stairs using a walker.   Date Last Reviewed: 6/12/2015  © 9525-8632 [employer]. [address] [place] [address], [place] [address]. All rights reserved. This information is not intended as a substitute for professional medical care. Always follow your healthcare professional's instructions.

## 2019-09-17 NOTE — PROGRESS NOTES
"Damari Grant presented for a  Medicare AWV and comprehensive Health Risk Assessment today. The following components were reviewed and updated:    · Medical history  · Family History  · Social history  · Allergies and Current Medications  · Health Risk Assessment  · Health Maintenance  · Care Team -external providers - updated see care team    ** See Completed Assessments for Annual Wellness Visit within the encounter summary.**       The following assessments were completed:  · Living Situation  · CAGE  · Depression Screening  · Timed Get Up and Go  · Whisper Test  · Cognitive Function Screening-abnormal clock drawing-discussed w pt.  · Nutrition Screening  · ADL Screening  · PAQ Screening    Vitals:    09/17/19 0820   BP: 127/70   BP Location: Left arm   Patient Position: Sitting   BP Method: Small (Manual)   Pulse: 73   Resp: 15   SpO2: 96%   Weight: 80.4 kg (177 lb 4 oz)   Height: 5' 5" (1.651 m)     Body mass index is 29.5 kg/m².  Physical Exam   Constitutional: She is oriented to person, place, and time. She appears well-developed and well-nourished.   HENT:   Head: Normocephalic and atraumatic.   [place]   Cardiovascular: Normal rate, regular rhythm and normal heart sounds.   No murmur heard.  Pulmonary/Chest: Effort normal and breath sounds normal. No respiratory distress. She has no wheezes. She has no rales.   Abdominal: Soft. Bowel sounds are normal. She exhibits no distension. There is no tenderness.   Musculoskeletal: Normal range of motion. She exhibits no edema, tenderness or deformity.   Neurological: She is alert and oriented to person, place, and time. No cranial nerve deficit.   Skin: Skin is warm and dry.   Psychiatric: She has a normal mood and affect. Her behavior is normal. Judgment and thought content normal.   Nursing note and vitals reviewed.        Diagnoses and health risks identified today and associated recommendations/orders:    1. BOOP (bronchiolitis obliterans with organizing " pneumonia)  Stable- followed by PCP, Dr Herrera    2. Lipid disorder  Stable- followed by external cardiology    3. Neuropathy  Stable- followed by external neurology, PCP    4. Carotid artery disease, unspecified laterality, unspecified type  Stable- followed by external cardiology, PCP    5. Bilateral low back pain without sciatica, unspecified chronicity  Stable- followed by PCP    6. Nonrheumatic aortic valve stenosis  Stable- followed by external cardiology    7. Chronic fatigue  Stable- followed by PCP    8. Atherosclerosis of aorta  Stable- followed by external cardiology    9. Essential hypertension  Stable- followed by external cardiology    10. History of syncope  Stable- followed by external cardiology, PCP    11. Other constipation  Stable- followed by PCP    12. Risk for falls  Stable- followed by PCP    13. Hernia of abdominal wall  Stable- followed by PCP    14. Urinary complication  Stable- followed by PCP    15. Encounter for preventive health examination  Assessments completed. Preventative health recommendations reviewed.     16. SNHL bilateral  Stable- followed by external audiology    Provided Damari with a 5-10 year written screening schedule and personal prevention plan. Recommendations were developed using the USPSTF age appropriate recommendations. Education, counseling, and referrals were provided as needed. After Visit Summary printed and given to patient which includes a list of additional screenings\tests needed. She will schedule annual PCP & annual labs w PCP. Copies of recent  external labs (CMP,CBC,ESR,B12,CRP,TSH) brought to clinic- normal ranges.Pt Labels placed & forwarded to Dr Ya for review. Prevention of falls discussed. Here w daughter.     Follow up in about 1 year (around 9/17/2020) for HRA.    RADHA Naranjo offered to discuss end of life issues, including information on how to make advance directives that the patient could use to name someone who would make  medical decisions on their behalf if they became too ill to make themselves.    ___Patient declined  _X_Patient  Has completed

## 2019-09-17 NOTE — Clinical Note
Primary Care Providers:Kana Ya MD, MD (General)Your patient was seen today for a HRA visit. No Gap(s) in care (HEDIS gaps) have been identified during this visit that require additional testing and possible follow up.No orders of the defined types were placed in this encounter.These orders were placed using [facility] approved protocol and any results will be forwarded to your office for appropriate follow up. I have included a copy of my visit note; please review the note and feel free to contact me with any questions. Thank you for allowing me to participate in the care of your patients.Corrie Dunn NP

## 2019-12-30 ENCOUNTER — OFFICE VISIT (OUTPATIENT)
Dept: URGENT CARE | Facility: CLINIC | Age: 84
End: 2019-12-30
Payer: MEDICARE

## 2019-12-30 VITALS
RESPIRATION RATE: 18 BRPM | SYSTOLIC BLOOD PRESSURE: 118 MMHG | HEIGHT: 63 IN | DIASTOLIC BLOOD PRESSURE: 74 MMHG | WEIGHT: 173 LBS | OXYGEN SATURATION: 97 % | TEMPERATURE: 97 F | HEART RATE: 110 BPM | BODY MASS INDEX: 30.65 KG/M2

## 2019-12-30 DIAGNOSIS — R05.9 COUGH: ICD-10-CM

## 2019-12-30 DIAGNOSIS — M54.50 ACUTE BILATERAL LOW BACK PAIN WITHOUT SCIATICA: Primary | ICD-10-CM

## 2019-12-30 LAB
BILIRUB UR QL STRIP: NEGATIVE
GLUCOSE UR QL STRIP: NEGATIVE
KETONES UR QL STRIP: NEGATIVE
LEUKOCYTE ESTERASE UR QL STRIP: NEGATIVE
PH, POC UA: 5 (ref 5–8)
POC BLOOD, URINE: NEGATIVE
POC NITRATES, URINE: NEGATIVE
PROT UR QL STRIP: NEGATIVE
SP GR UR STRIP: 1.02 (ref 1–1.03)
UROBILINOGEN UR STRIP-ACNC: NORMAL (ref 0.1–1.1)

## 2019-12-30 PROCEDURE — 81003 POCT URINALYSIS, DIPSTICK, AUTOMATED, W/O SCOPE: ICD-10-PCS | Mod: QW,S$GLB,, | Performed by: PHYSICIAN ASSISTANT

## 2019-12-30 PROCEDURE — 99214 OFFICE O/P EST MOD 30 MIN: CPT | Mod: S$GLB,,, | Performed by: PHYSICIAN ASSISTANT

## 2019-12-30 PROCEDURE — 99214 PR OFFICE/OUTPT VISIT, EST, LEVL IV, 30-39 MIN: ICD-10-PCS | Mod: S$GLB,,, | Performed by: PHYSICIAN ASSISTANT

## 2019-12-30 PROCEDURE — 81003 URINALYSIS AUTO W/O SCOPE: CPT | Mod: QW,S$GLB,, | Performed by: PHYSICIAN ASSISTANT

## 2019-12-30 PROCEDURE — 71046 XR CHEST PA AND LATERAL: ICD-10-PCS | Mod: FY,S$GLB,, | Performed by: RADIOLOGY

## 2019-12-30 PROCEDURE — 71046 X-RAY EXAM CHEST 2 VIEWS: CPT | Mod: FY,S$GLB,, | Performed by: RADIOLOGY

## 2019-12-30 RX ORDER — LOSARTAN POTASSIUM AND HYDROCHLOROTHIAZIDE 12.5; 1 MG/1; MG/1
1 TABLET ORAL DAILY
COMMUNITY
End: 2020-11-20

## 2019-12-30 NOTE — PROGRESS NOTES
"Subjective:       Patient ID: Damari Grant is a 88 y.o. female.    Vitals:  height is 5' 3" (1.6 m) and weight is 78.5 kg (173 lb). Her temperature is 97 °F (36.1 °C). Her blood pressure is 118/74 and her pulse is 110. Her respiration is 18 and oxygen saturation is 97%.     Chief Complaint: Back Pain    Patient a history of atrial fibrillation.  She defibrillation performed 5 weeks ago and is no longer in atrial fibrillation.  About 2 weeks ago she started having sinusitis with cough.  She states cough is still progressed over the last 2 weeks and she is now having tightness across her low back.  She has concern for possible development of pneumonia she has had this in the past.  She denies any trauma or injury.  She denies bowel or bladder incontinence.  She denies fever or malaise.  She states she usually does not get fever when she has pneumonia.  She denies urinary symptoms.  She denies abdominal pain or discomfort.  She denies numbness tingling or weakness of her legs.    Back Pain   This is a new problem. The current episode started in the past 7 days. The problem occurs constantly. The problem has been gradually worsening since onset. Quality: tightness. The pain does not radiate. The pain is at a severity of 2/10. The pain is mild. Pertinent negatives include no abdominal pain, bladder incontinence, bowel incontinence, chest pain, dysuria, fever, headaches, leg pain, numbness, paresis, paresthesias, pelvic pain, perianal numbness, tingling, weakness or weight loss.       Constitution: Positive for fatigue. Negative for chills and fever.   HENT: Negative for congestion and sore throat.    Neck: Negative for painful lymph nodes.   Cardiovascular: Negative for chest pain and leg swelling.   Eyes: Negative for double vision and blurred vision.   Respiratory: Positive for cough. Negative for shortness of breath.    Gastrointestinal: Negative for abdominal pain, diarrhea and bowel incontinence. "   Genitourinary: Negative for dysuria, frequency, urgency, bladder incontinence, history of kidney stones and pelvic pain.   Musculoskeletal: Positive for back pain. Negative for joint pain, joint swelling, muscle cramps and muscle ache.   Skin: Negative for color change, pale, rash and bruising.   Allergic/Immunologic: Negative for seasonal allergies.   Neurological: Negative for dizziness, history of vertigo, light-headedness, passing out, headaches and numbness.   Hematologic/Lymphatic: Negative for swollen lymph nodes.   Psychiatric/Behavioral: Negative for nervous/anxious, sleep disturbance and depression. The patient is not nervous/anxious.        Objective:      Physical Exam   Constitutional: She is oriented to person, place, and time. Vital signs are normal. She appears well-developed and well-nourished. She is active and cooperative.  Non-toxic appearance. She does not have a sickly appearance. She does not appear ill. No distress.   HENT:   Head: Normocephalic and atraumatic.   Right Ear: Hearing, tympanic membrane, external ear and ear canal normal.   Left Ear: Hearing, tympanic membrane, external ear and ear canal normal.   Nose: Nose normal. No mucosal edema, rhinorrhea or nasal deformity. No epistaxis. Right sinus exhibits no maxillary sinus tenderness and no frontal sinus tenderness. Left sinus exhibits no maxillary sinus tenderness and no frontal sinus tenderness.   Mouth/Throat: Uvula is midline, oropharynx is clear and moist and mucous membranes are normal. No trismus in the jaw. Normal dentition. No uvula swelling. No oropharyngeal exudate, posterior oropharyngeal edema or posterior oropharyngeal erythema.   Eyes: Conjunctivae and lids are normal. No scleral icterus.   Neck: Trachea normal, normal range of motion, full passive range of motion without pain and phonation normal. Neck supple. No neck rigidity. No edema and no erythema present.   Cardiovascular: Normal rate, regular rhythm, normal  heart sounds, intact distal pulses and normal pulses.   Pulmonary/Chest: Effort normal and breath sounds normal. No respiratory distress. She has no decreased breath sounds. She has no rhonchi.   Abdominal: Soft. Normal appearance and bowel sounds are normal. She exhibits no abdominal bruit, no pulsatile midline mass and no mass.   Musculoskeletal: Normal range of motion. She exhibits no edema or deformity.        Cervical back: She exhibits normal range of motion, no tenderness, no bony tenderness, no swelling, no edema, no deformity, no laceration, no pain, no spasm and normal pulse.        Thoracic back: She exhibits normal range of motion, no tenderness, no bony tenderness, no swelling, no edema, no deformity, no laceration, no pain, no spasm and normal pulse.        Lumbar back: She exhibits normal range of motion, no tenderness, no bony tenderness, no swelling, no edema, no deformity, no laceration, no pain, no spasm and normal pulse.   Neurological: She is alert and oriented to person, place, and time. She has normal strength and normal reflexes. No sensory deficit. She exhibits normal muscle tone. Coordination normal.   Skin: Skin is warm, dry, intact, not diaphoretic and not pale.   Psychiatric: She has a normal mood and affect. Her speech is normal and behavior is normal. Judgment and thought content normal. Cognition and memory are normal.   Nursing note and vitals reviewed.        X-ray Chest Pa And Lateral    Result Date: 12/30/2019  EXAMINATION: XR CHEST PA AND LATERAL CLINICAL HISTORY: Cough FINDINGS: Two views: Heart size is normal lungs are clear and the bones showed DJD.  There is aortic plaque.  There is postoperative change.     No acute process seen. Electronically signed by: Moris Malloy MD Date:    12/30/2019 Time:    12:37      Assessment:       1. Acute bilateral low back pain without sciatica    2. Cough        Plan:         Acute bilateral low back pain without sciatica  -     POCT  Urinalysis, Dipstick, Automated, W/O Scope    Cough  -     X-Ray Chest PA And Lateral; Future; Expected date: 12/30/2019     Discussed back pain is likely due to coughing and muscular strain.  Discussed exact etiology unknown.  Discussed home care instructions.  Discussed red flag/warning symptoms that would warrant emergent medical attention.  Discussed follow-up with her primary care physician.  All her questions were answered she verbalized understanding.       Back Pain (Acute or Chronic)    Back pain is one of the most common problems. The good news is that most people feel better in 1 to 2 weeks, and most of the rest in 1 to 2 months. Most people can remain active.  People experience and describe pain differently; not everyone is the same.  · The pain can be sharp, stabbing, shooting, aching, cramping or burning.  · Movement, standing, bending, lifting, sitting, or walking may worsen pain.  · It can be localized to one spot or area, or it can be more generalized.  · It can spread or radiate upwards, to the front, or go down your arms or legs (sciatica).  · It can cause muscle spasm.  Most of the time, mechanical problems with the muscles or spine cause the pain. Mechanical problems are usually caused by an injury to the muscles or ligaments. While illness can cause back pain, it is usually not caused by a serious illness. Mechanical problems include:   · Physical activity such as sports, exercise, work, or normal activity  · Overexertion, lifting, pushing, pulling incorrectly or too aggressively  · Sudden twisting, bending, or stretching from an accident, or accidental movement  · Poor posture  · Stretching or moving wrong, without noticing pain at the time  · Poor coordination, lack of regular exercise (check with your doctor about this)  · Spinal disc disease or arthritis  · Stress  Pain can also be related to pregnancy, or illness like appendicitis, bladder or kidney infections, pelvic infections, and many  other things.  Acute back pain usually gets better in 1 to 2 weeks. Back pain related to disk disease, arthritis in the spinal joints or spinal stenosis (narrowing of the spinal canal) can become chronic and last for months or years.  Unless you had a physical injury (for example, a car accident or fall) X-rays are usually not needed for the initial evaluation of back pain. If pain continues and does not respond to medical treatment, X-rays and other tests may be needed.  Home care  Try these home care recommendations:  · When in bed, try to find a position of comfort. A firm mattress is best. Try lying flat on your back with pillows under your knees. You can also try lying on your side with your knees bent up towards your chest and a pillow between your knees.  · At first, do not try to stretch out the sore spots. If there is a strain, it is not like the good soreness you get after exercising without an injury. In this case, stretching may make it worse.  · Avoid prolong sitting, long car rides, or travel. This puts more stress on the lower back than standing or walking.  · During the first 24 to 72 hours after an acute injury or flare up of chronic back pain, apply an ice pack to the painful area for 20 minutes and then remove it for 20 minutes. Do this over a period of 60 to 90 minutes or several times a day. This will reduce swelling and pain. Wrap the ice pack in a thin towel or plastic to protect your skin.  · You can start with ice, then switch to heat. Heat (hot shower, hot bath, or heating pad) reduces pain and works well for muscle spasms. Heat can be applied to the painful area for 20 minutes then remove it for 20 minutes. Do this over a period of 60 to 90 minutes or several times a day. Do not sleep on a heating pad. It can lead to skin burns or tissue damage.  · You can alternate ice and heat therapy. Talk with your doctor about the best treatment for your back pain.  · Therapeutic massage can help  relax the back muscles without stretching them.  · Be aware of safe lifting methods and do not lift anything without stretching first.  Medicines  Talk to your doctor before using medicine, especially if you have other medical problems or are taking other medicines.  · You may use over-the-counter medicine as directed on the bottle to control pain, unless another pain medicine was prescribed. If you have chronic conditions like diabetes, liver or kidney disease, stomach ulcers, or gastrointestinal bleeding, or are taking blood thinners, talk to your doctor before taking any medicine.  · Be careful if you are given a prescription medicines, narcotics, or medicine for muscle spasms. They can cause drowsiness, affect your coordination, reflexes, and judgement. Do not drive or operate heavy machinery.  Follow-up care  Follow up with your healthcare provider, or as advised.   A radiologist will review any X-rays that were taken. Your provide will notify you of any new findings that may affect your care.  Call 911  Call emergency services if any of the following occur:  · Trouble breathing  · Confusion  · Very drowsy or trouble awakening  · Fainting or loss of consciousness  · Rapid or very slow heart rate  · Loss of bowel or bladder control  When to seek medical advice  Call your healthcare provider right away if any of these occur:   · Pain becomes worse or spreads to your legs  · Weakness or numbness in one or both legs  · Numbness in the groin or genital area  Date Last Reviewed: 7/1/2016  © 8677-4601 [employer]. [address] [place] [address]. All rights reserved. This information is not intended as a substitute for professional medical care. Always follow your healthcare professional's instructions.        Relieving Back Pain  Back pain is a common problem. You can strain back muscles by lifting too much weight or just by moving the wrong way. Back strain can be uncomfortable, even painful.  And it can take weeks or months to improve. To help yourself feel better and prevent future back strains, try these tips.  Important Note: Do not give aspirin to children or teens without first discussing it with your healthcare provider.      ? Ice    Ice reduces muscle pain and swelling. It helps most during the first 24 to 48 hours after an injury.  · Wrap an ice pack or a bag of frozen peas in a thin towel. (Never place ice directly on your skin.)  · Place the ice where your back hurts the most.  · Dont ice for more than 20 minutes at a time.  · You can use ice several times a day.  ? Medicines  Over-the-counter pain relievers can include acetaminophen and anti-inflammatory medicines, which includes aspirin or ibuprofen. They can help ease discomfort. Some also reduce swelling.  · Tell your healthcare provider about any medicines you are already taking.  · Take medicines only as directed.  ? Heat  After the first 48 hours, heat can relax sore muscles and improve blood flow.  · Try a warm bath or shower. Or use a heating pad set on low. To prevent a burn, keep a cloth between you and the heating pad.  · Dont use a heating pad for more than 15 minutes at a time. Never sleep on a heating pad.  Date Last Reviewed: 9/1/2015  © 4216-6885 [employer] [address] [place] [address], [place] [address]. All rights reserved. This information is not intended as a substitute for professional medical care. Always follow your healthcare professional's instructions.      Patient Instructions   -Below are suggestions for symptomatic relief:              -Tylenol every 4 hours OR ibuprofen every 6 hours as needed for pain/fever.              -Salt water gargles to soothe throat pain.              -Chloroseptic spray also helps to numb throat pain.              -Nasal saline spray reduces inflammation and dryness.              -Warm face compresses to help with facial sinus pain/pressure.              -Vicks vapor rub at  night.              -Flonase OTC or Nasacort OTC for nasal congestion.              -Simple foods like chicken noodle soup.              -Delsym helps with coughing at night              -Zyrtec/Claritin during the day & Benadryl at night may help with allergies.                If you DO NOT have Hypertension or any history of palpitations, it is ok to take over the counter Sudafed or Mucinex D or Allegra-D or Claritin-D or Zyrtec-D.  If you do take one of the above, it is ok to combine that with plain over the counter Mucinex or Allegra or Claritin or Zyrtec. If, for example, you are taking Zyrtec -D, you can combine that with Mucinex, but not Mucinex-D.  If you are taking Mucinex-D, you can combine that with plain Allegra or Claritin or Zyrtec.   If you DO have Hypertension or palpitations, it is safe to take Coricidin HBP for relief of sinus symptoms.    Please follow up with your Primary care provider within 2-5 days if your signs and symptoms have not resolved or worsen.     If your condition worsens or fails to improve we recommend that you receive another evaluation at the emergency room immediately or contact your primary medical clinic to discuss your concerns.   You must understand that you have received an Urgent Care treatment only and that you may be released before all of your medical problems are known or treated. You, the patient, will arrange for follow up care as instructed.     RED FLAGS/WARNING SYMPTOMS DISCUSSED WITH PATIENT THAT WOULD WARRANT EMERGENT MEDICAL ATTENTION. PATIENT VERBALIZED UNDERSTANDING.

## 2019-12-30 NOTE — PATIENT INSTRUCTIONS
Back Pain (Acute or Chronic)    Back pain is one of the most common problems. The good news is that most people feel better in 1 to 2 weeks, and most of the rest in 1 to 2 months. Most people can remain active.  People experience and describe pain differently; not everyone is the same.  · The pain can be sharp, stabbing, shooting, aching, cramping or burning.  · Movement, standing, bending, lifting, sitting, or walking may worsen pain.  · It can be localized to one spot or area, or it can be more generalized.  · It can spread or radiate upwards, to the front, or go down your arms or legs (sciatica).  · It can cause muscle spasm.  Most of the time, mechanical problems with the muscles or spine cause the pain. Mechanical problems are usually caused by an injury to the muscles or ligaments. While illness can cause back pain, it is usually not caused by a serious illness. Mechanical problems include:   · Physical activity such as sports, exercise, work, or normal activity  · Overexertion, lifting, pushing, pulling incorrectly or too aggressively  · Sudden twisting, bending, or stretching from an accident, or accidental movement  · Poor posture  · Stretching or moving wrong, without noticing pain at the time  · Poor coordination, lack of regular exercise (check with your doctor about this)  · Spinal disc disease or arthritis  · Stress  Pain can also be related to pregnancy, or illness like appendicitis, bladder or kidney infections, pelvic infections, and many other things.  Acute back pain usually gets better in 1 to 2 weeks. Back pain related to disk disease, arthritis in the spinal joints or spinal stenosis (narrowing of the spinal canal) can become chronic and last for months or years.  Unless you had a physical injury (for example, a car accident or fall) X-rays are usually not needed for the initial evaluation of back pain. If pain continues and does not respond to medical treatment, X-rays and other tests may be  needed.  Home care  Try these home care recommendations:  · When in bed, try to find a position of comfort. A firm mattress is best. Try lying flat on your back with pillows under your knees. You can also try lying on your side with your knees bent up towards your chest and a pillow between your knees.  · At first, do not try to stretch out the sore spots. If there is a strain, it is not like the good soreness you get after exercising without an injury. In this case, stretching may make it worse.  · Avoid prolong sitting, long car rides, or travel. This puts more stress on the lower back than standing or walking.  · During the first 24 to 72 hours after an acute injury or flare up of chronic back pain, apply an ice pack to the painful area for 20 minutes and then remove it for 20 minutes. Do this over a period of 60 to 90 minutes or several times a day. This will reduce swelling and pain. Wrap the ice pack in a thin towel or plastic to protect your skin.  · You can start with ice, then switch to heat. Heat (hot shower, hot bath, or heating pad) reduces pain and works well for muscle spasms. Heat can be applied to the painful area for 20 minutes then remove it for 20 minutes. Do this over a period of 60 to 90 minutes or several times a day. Do not sleep on a heating pad. It can lead to skin burns or tissue damage.  · You can alternate ice and heat therapy. Talk with your doctor about the best treatment for your back pain.  · Therapeutic massage can help relax the back muscles without stretching them.  · Be aware of safe lifting methods and do not lift anything without stretching first.  Medicines  Talk to your doctor before using medicine, especially if you have other medical problems or are taking other medicines.  · You may use over-the-counter medicine as directed on the bottle to control pain, unless another pain medicine was prescribed. If you have chronic conditions like diabetes, liver or kidney disease,  stomach ulcers, or gastrointestinal bleeding, or are taking blood thinners, talk to your doctor before taking any medicine.  · Be careful if you are given a prescription medicines, narcotics, or medicine for muscle spasms. They can cause drowsiness, affect your coordination, reflexes, and judgement. Do not drive or operate heavy machinery.  Follow-up care  Follow up with your healthcare provider, or as advised.   A radiologist will review any X-rays that were taken. Your provide will notify you of any new findings that may affect your care.  Call 911  Call emergency services if any of the following occur:  · Trouble breathing  · Confusion  · Very drowsy or trouble awakening  · Fainting or loss of consciousness  · Rapid or very slow heart rate  · Loss of bowel or bladder control  When to seek medical advice  Call your healthcare provider right away if any of these occur:   · Pain becomes worse or spreads to your legs  · Weakness or numbness in one or both legs  · Numbness in the groin or genital area  Date Last Reviewed: 7/1/2016 © 2000-2017 [employer]. [address] [place] [address]. All rights reserved. This information is not intended as a substitute for professional medical care. Always follow your healthcare professional's instructions.        Relieving Back Pain  Back pain is a common problem. You can strain back muscles by lifting too much weight or just by moving the wrong way. Back strain can be uncomfortable, even painful. And it can take weeks or months to improve. To help yourself feel better and prevent future back strains, try these tips.  Important Note: Do not give aspirin to children or teens without first discussing it with your healthcare provider.      ? Ice    Ice reduces muscle pain and swelling. It helps most during the first 24 to 48 hours after an injury.  · Wrap an ice pack or a bag of frozen peas in a thin towel. (Never place ice directly on your skin.)  · Place  the ice where your back hurts the most.  · Dont ice for more than 20 minutes at a time.  · You can use ice several times a day.  ? Medicines  Over-the-counter pain relievers can include acetaminophen and anti-inflammatory medicines, which includes aspirin or ibuprofen. They can help ease discomfort. Some also reduce swelling.  · Tell your healthcare provider about any medicines you are already taking.  · Take medicines only as directed.  ? Heat  After the first 48 hours, heat can relax sore muscles and improve blood flow.  · Try a warm bath or shower. Or use a heating pad set on low. To prevent a burn, keep a cloth between you and the heating pad.  · Dont use a heating pad for more than 15 minutes at a time. Never sleep on a heating pad.  Date Last Reviewed: 9/1/2015  © 0048-0737 [employer]. [address] [place] [address]. All rights reserved. This information is not intended as a substitute for professional medical care. Always follow your healthcare professional's instructions.      Patient Instructions   -Below are suggestions for symptomatic relief:              -Tylenol every 4 hours OR ibuprofen every 6 hours as needed for pain/fever.              -Salt water gargles to soothe throat pain.              -Chloroseptic spray also helps to numb throat pain.              -Nasal saline spray reduces inflammation and dryness.              -Warm face compresses to help with facial sinus pain/pressure.              -Vicks vapor rub at night.              -Flonase OTC or Nasacort OTC for nasal congestion.              -Simple foods like chicken noodle soup.              -Delsym helps with coughing at night              -Zyrtec/Claritin during the day & Benadryl at night may help with allergies.                If you DO NOT have Hypertension or any history of palpitations, it is ok to take over the counter Sudafed or Mucinex D or Allegra-D or Claritin-D or Zyrtec-D.  If you do take one of the  above, it is ok to combine that with plain over the counter Mucinex or Allegra or Claritin or Zyrtec. If, for example, you are taking Zyrtec -D, you can combine that with Mucinex, but not Mucinex-D.  If you are taking Mucinex-D, you can combine that with plain Allegra or Claritin or Zyrtec.   If you DO have Hypertension or palpitations, it is safe to take Coricidin HBP for relief of sinus symptoms.    Please follow up with your Primary care provider within 2-5 days if your signs and symptoms have not resolved or worsen.     If your condition worsens or fails to improve we recommend that you receive another evaluation at the emergency room immediately or contact your primary medical clinic to discuss your concerns.   You must understand that you have received an Urgent Care treatment only and that you may be released before all of your medical problems are known or treated. You, the patient, will arrange for follow up care as instructed.     RED FLAGS/WARNING SYMPTOMS DISCUSSED WITH PATIENT THAT WOULD WARRANT EMERGENT MEDICAL ATTENTION. PATIENT VERBALIZED UNDERSTANDING.

## 2020-03-10 NOTE — TELEPHONE ENCOUNTER
A Bal Trek Rx 2.5x12mm was inflated in the right coronary artery.     The balloon was inflated at 8 dionna for 30 seconds at 3/10/2020 12:54 PM.  The balloon was used for 2nd inflation at 10 dionna for 30 seconds.  The balloon was used for 3rd inflation at 10 dionna for 30 seconds.   Labs done by dr yessi jones [facility].    Cmp, cbc, sed rate, tsh , vb12 and c reactive protein    All were in normal range.  Sent copy to be scanned to chart.

## 2020-06-16 ENCOUNTER — PES CALL (OUTPATIENT)
Dept: ADMINISTRATIVE | Facility: CLINIC | Age: 85
End: 2020-06-16

## 2020-07-15 DIAGNOSIS — Z71.89 COMPLEX CARE COORDINATION: ICD-10-CM

## 2020-08-25 ENCOUNTER — TELEPHONE (OUTPATIENT)
Dept: INTERNAL MEDICINE | Facility: CLINIC | Age: 85
End: 2020-08-25

## 2020-08-26 ENCOUNTER — PES CALL (OUTPATIENT)
Dept: ADMINISTRATIVE | Facility: CLINIC | Age: 85
End: 2020-08-26

## 2020-08-26 NOTE — TELEPHONE ENCOUNTER
Last seen January 2019. Annual is past due.  She is seeing dr steel at  ev 4 mos and he is doing thyroid labs and started her on levothyroxine.  He told her to get refills from pcp.    I explained you were not seeing patients in clinic yet and I would call her to book physical once you were.  Daughter roselia expressed understanding all.      Please approve     Thanks karan

## 2020-08-26 NOTE — TELEPHONE ENCOUNTER
----- Message from Jaci Shine sent at 8/26/2020  8:47 AM CDT -----  Contact: self/[phone]  Requesting an RX refill or new RX.  Is this a refill or new RX:  New rx 1  RX name and strength: Levothyroxine 25 mg tablet  Directions (copy/paste from chart):    Is this a 30 day or 90 day RX:  30 days  Local pharmacy or mail order pharmacy:  local pharmacy  Pharmacy name and phone # (copy/paste from chart):  [facility] ([place]) - [address] - [address] [place] [phone] (Phone)  [phone] (Fax)       Comments:

## 2020-08-28 ENCOUNTER — TELEPHONE (OUTPATIENT)
Dept: INTERNAL MEDICINE | Facility: CLINIC | Age: 85
End: 2020-08-28

## 2020-08-28 RX ORDER — LEVOTHYROXINE SODIUM 25 UG/1
25 TABLET ORAL
Qty: 90 TABLET | Refills: 3 | Status: SHIPPED | OUTPATIENT
Start: 2020-08-28 | End: 2021-06-18

## 2020-08-28 NOTE — TELEPHONE ENCOUNTER
----- Message from Rosio Ngo sent at 8/28/2020  9:07 AM CDT -----  Contact: Sujata/Daughter [phone]  2nd Request    Prescription Request:     Name of medication: Levothyroxine 25 mg tablet    Reason for request: Refill    Pharmacy: [facility] ([place]) - [address]          [phone] (Phone)      Please advise.    Thank You

## 2020-08-28 NOTE — TELEPHONE ENCOUNTER
I called daughter & apologized for wait.  I did speak to dr yung and he will be handling refills this am.

## 2020-10-19 ENCOUNTER — TELEPHONE (OUTPATIENT)
Dept: INTERNAL MEDICINE | Facility: CLINIC | Age: 85
End: 2020-10-19

## 2020-10-19 NOTE — TELEPHONE ENCOUNTER
Dr Matteo LOZANO-      That phone number is not correct.  Phone is [phone], I looked up daughter phone number.    Ms veloz says ms hobson has been thru a few procedures  Over last 2 months.  Stint , tia, carotid valve surgery.   Was doing fine till Friday she strained/ bowels long time.  She slept all weekend.   Exhausted and sob over weekend.  Yesterday got up to eat. And started to collapse.   Does not have home health per daughter.  Today very tired. Memory off.  Refuses to go to er. Feels exhausted.     I discussed with dr herrera-  He said must go to er.  I told her please bring her and don't tell her where going.  We can't properly treat here in clinic.    She was still hesitant- I told her to call heart dr for advise.,  They will say the same.

## 2020-10-19 NOTE — TELEPHONE ENCOUNTER
----- Message from Marly Meek sent at 10/19/2020  8:44 AM CDT -----  Contact: daughter/roselia/[phone]  Pt daughter called in regards to scheduling the pt a home visit. Pt daughter would like a call back.     Please advise

## 2020-11-10 ENCOUNTER — TELEPHONE (OUTPATIENT)
Dept: INTERNAL MEDICINE | Facility: CLINIC | Age: 85
End: 2020-11-10

## 2020-11-10 DIAGNOSIS — Z00.00 PHYSICAL EXAM, ANNUAL: ICD-10-CM

## 2020-11-10 DIAGNOSIS — I10 ESSENTIAL HYPERTENSION: Primary | ICD-10-CM

## 2020-11-10 DIAGNOSIS — R73.01 ABNORMAL FASTING GLUCOSE: ICD-10-CM

## 2020-11-10 NOTE — TELEPHONE ENCOUNTER
----- Message from Viviane Varma sent at 11/10/2020 11:34 AM CST -----  Doctor appointment and lab have been scheduled.  Please link lab orders to the lab appointment.  Date of doctor appointment:  11/20/20  Date of lab appointment:  11/13/20  Physical or F/U: Phys

## 2020-11-11 ENCOUNTER — TELEPHONE (OUTPATIENT)
Dept: INTERNAL MEDICINE | Facility: CLINIC | Age: 85
End: 2020-11-11

## 2020-11-11 NOTE — TELEPHONE ENCOUNTER
She has been in  a few times this year and daughter wants her to get records sent to dr moore to review for appt coming up so we  Can get scanned on the chart.    I am faxing  medical records requesting hist and phys and discharge summaries from 2020 admissions's.

## 2020-11-13 ENCOUNTER — LAB VISIT (OUTPATIENT)
Dept: LAB | Facility: HOSPITAL | Age: 85
End: 2020-11-13
Attending: STUDENT IN AN ORGANIZED HEALTH CARE EDUCATION/TRAINING PROGRAM
Payer: MEDICARE

## 2020-11-13 DIAGNOSIS — Z00.00 PHYSICAL EXAM, ANNUAL: ICD-10-CM

## 2020-11-13 DIAGNOSIS — I10 ESSENTIAL HYPERTENSION: ICD-10-CM

## 2020-11-13 DIAGNOSIS — R73.01 ABNORMAL FASTING GLUCOSE: ICD-10-CM

## 2020-11-13 LAB
ALBUMIN SERPL BCP-MCNC: 3.7 G/DL (ref 3.5–5.2)
ALP SERPL-CCNC: 69 U/L (ref 55–135)
ALT SERPL W/O P-5'-P-CCNC: 11 U/L (ref 10–44)
ANION GAP SERPL CALC-SCNC: 10 MMOL/L (ref 8–16)
AST SERPL-CCNC: 14 U/L (ref 10–40)
BASOPHILS # BLD AUTO: 0.04 K/UL (ref 0–0.2)
BASOPHILS NFR BLD: 0.8 % (ref 0–1.9)
BILIRUB SERPL-MCNC: 1 MG/DL (ref 0.1–1)
BUN SERPL-MCNC: 14 MG/DL (ref 8–23)
CALCIUM SERPL-MCNC: 9.5 MG/DL (ref 8.7–10.5)
CHLORIDE SERPL-SCNC: 105 MMOL/L (ref 95–110)
CHOLEST SERPL-MCNC: 190 MG/DL (ref 120–199)
CHOLEST/HDLC SERPL: 3.5 {RATIO} (ref 2–5)
CO2 SERPL-SCNC: 24 MMOL/L (ref 23–29)
CREAT SERPL-MCNC: 0.8 MG/DL (ref 0.5–1.4)
DIFFERENTIAL METHOD: ABNORMAL
EOSINOPHIL # BLD AUTO: 0.1 K/UL (ref 0–0.5)
EOSINOPHIL NFR BLD: 1.7 % (ref 0–8)
ERYTHROCYTE [DISTWIDTH] IN BLOOD BY AUTOMATED COUNT: 13.7 % (ref 11.5–14.5)
EST. GFR  (AFRICAN AMERICAN): >60 ML/MIN/1.73 M^2
EST. GFR  (NON AFRICAN AMERICAN): >60 ML/MIN/1.73 M^2
ESTIMATED AVG GLUCOSE: 114 MG/DL (ref 68–131)
GLUCOSE SERPL-MCNC: 113 MG/DL (ref 70–110)
HBA1C MFR BLD HPLC: 5.6 % (ref 4–5.6)
HCT VFR BLD AUTO: 42 % (ref 37–48.5)
HDLC SERPL-MCNC: 54 MG/DL (ref 40–75)
HDLC SERPL: 28.4 % (ref 20–50)
HGB BLD-MCNC: 13.4 G/DL (ref 12–16)
IMM GRANULOCYTES # BLD AUTO: 0.01 K/UL (ref 0–0.04)
IMM GRANULOCYTES NFR BLD AUTO: 0.2 % (ref 0–0.5)
LDLC SERPL CALC-MCNC: 108 MG/DL (ref 63–159)
LYMPHOCYTES # BLD AUTO: 1.6 K/UL (ref 1–4.8)
LYMPHOCYTES NFR BLD: 30.9 % (ref 18–48)
MCH RBC QN AUTO: 29.8 PG (ref 27–31)
MCHC RBC AUTO-ENTMCNC: 31.9 G/DL (ref 32–36)
MCV RBC AUTO: 94 FL (ref 82–98)
MONOCYTES # BLD AUTO: 0.4 K/UL (ref 0.3–1)
MONOCYTES NFR BLD: 7.1 % (ref 4–15)
NEUTROPHILS # BLD AUTO: 3.1 K/UL (ref 1.8–7.7)
NEUTROPHILS NFR BLD: 59.3 % (ref 38–73)
NONHDLC SERPL-MCNC: 136 MG/DL
NRBC BLD-RTO: 0 /100 WBC
PLATELET # BLD AUTO: 177 K/UL (ref 150–350)
PMV BLD AUTO: 12.4 FL (ref 9.2–12.9)
POTASSIUM SERPL-SCNC: 3.9 MMOL/L (ref 3.5–5.1)
PROT SERPL-MCNC: 7.2 G/DL (ref 6–8.4)
RBC # BLD AUTO: 4.49 M/UL (ref 4–5.4)
SODIUM SERPL-SCNC: 139 MMOL/L (ref 136–145)
TRIGL SERPL-MCNC: 140 MG/DL (ref 30–150)
TSH SERPL DL<=0.005 MIU/L-ACNC: 2.23 UIU/ML (ref 0.4–4)
WBC # BLD AUTO: 5.24 K/UL (ref 3.9–12.7)

## 2020-11-13 PROCEDURE — 84443 ASSAY THYROID STIM HORMONE: CPT

## 2020-11-13 PROCEDURE — 80053 COMPREHEN METABOLIC PANEL: CPT

## 2020-11-13 PROCEDURE — 85025 COMPLETE CBC W/AUTO DIFF WBC: CPT

## 2020-11-13 PROCEDURE — 80061 LIPID PANEL: CPT

## 2020-11-13 PROCEDURE — 83036 HEMOGLOBIN GLYCOSYLATED A1C: CPT

## 2020-11-13 PROCEDURE — 36415 COLL VENOUS BLD VENIPUNCTURE: CPT | Mod: PO

## 2020-11-19 NOTE — PROGRESS NOTES
Subjective:       Patient ID: Damari Grant is a 89 y.o. female presenting to establish primary care.     Chief Complaint: Establish Care and Annual Exam    HPI   Ms. Grant is a very nice 89 yo F with SNHL, BOOP,  TIA, aortic atherosclerosis s/p TAVR with 2/2 LBBB), Afib, CAD s/p CABG, HTN, HLD, severe carotid artery stenosis (L-70/80%; R- 30/40%) s/p stenting of L-internal carotid-A, aortic stenosis, hypothyroidism presenting for yearly physical.    Yearly screening labs gathered in preporation for this nnamdi:  - MicAlb:Cr, UA/Microscopy, TSH, Lipid panel, A1c, and CBC/CMP without notable pathology (slight BG elevation, but no significant A1c elevation)     Cardiac Hx:  -Statin intolerant  -Follows with Cardiology (currently transitioning from  to [facility])     Family, social, surgical Hx reviewed     Health Maintenance:  Mammogram and Gyn exam: Aged out   Colorectal CA screening: Aged out   Cholesterol: As above   Vaccines: Influenza (UTD); Tetanus (Due); prevnar 13 (Due); Zoster (Due)  Exercise: Limited   Diet: Adequate     Past Medical History:   Diagnosis Date    Atrial fibrillation     HTN (hypertension)     Hyperlipidemia      Past Surgical History:   Procedure Laterality Date    ADENOIDECTOMY      APPENDECTOMY      CARDIAC VALVE REPLACEMENT      CORONARY ARTERY BYPASS GRAFT      HYSTERECTOMY      TONSILLECTOMY       Family History   Problem Relation Age of Onset    Cancer Mother      Social History     Socioeconomic History    Marital status:      Spouse name: Not on file    Number of children: Not on file    Years of education: Not on file    Highest education level: Not on file   Occupational History    Not on file   Social Needs    Financial resource strain: Not on file    Food insecurity     Worry: Not on file     Inability: Not on file    Transportation needs     Medical: Not on file     Non-medical: Not on file   Tobacco Use    Smoking status: Never Smoker    Smokeless  tobacco: Never Used   Substance and Sexual Activity    Alcohol use: Yes     Alcohol/week: 0.0 standard drinks     Comment: occasional high ball.    Drug use: No    Sexual activity: Not Currently     Partners: Male   Lifestyle    Physical activity     Days per week: Not on file     Minutes per session: Not on file    Stress: Not on file   Relationships    Social connections     Talks on phone: Not on file     Gets together: Not on file     Attends Restorationist service: Not on file     Active member of club or organization: Not on file     Attends meetings of clubs or organizations: Not on file     Relationship status: Not on file   Other Topics Concern    Not on file   Social History Narrative    Not on file     Review of patient's allergies indicates:   Allergen Reactions    Codeine Nausea And Vomiting    Crestor [rosuvastatin] Other (See Comments)     Muscle weakness    Fosamax [alendronate] Other (See Comments)     Didn't tolerate over 10 years ago and doesn't remember what happened.     Livalo [pitavastatin] Other (See Comments)     Muscle pain    Simvastatin Other (See Comments)     Leg pains/ weakness     Damari Grant had no medications administered during this visit.      Review of Systems   Constitutional: Negative for appetite change, chills and fever.   HENT: Negative.    Respiratory: Negative for cough, chest tightness and shortness of breath.    Cardiovascular: Negative for chest pain, palpitations and leg swelling.   Gastrointestinal: Negative for abdominal distention, abdominal pain, blood in stool, constipation, diarrhea, nausea and vomiting.   Endocrine: Negative.    Genitourinary: Negative for difficulty urinating, dysuria, frequency and hematuria.   Musculoskeletal: Negative.    Integumentary:  Negative.   Neurological: Negative.    Psychiatric/Behavioral: Negative.          Objective:      Vitals:    11/20/20 1043   BP: 130/80   Pulse: 80   Resp: 18   Temp: 97.9 °F (36.6 °C)       Physical Exam  Vitals signs reviewed.   Constitutional:       General: She is not in acute distress.     Appearance: Normal appearance.   HENT:      Head: Normocephalic and atraumatic.      Comments: Facial features are symmetric      Nose: Nose normal. No congestion or rhinorrhea.      Mouth/Throat:      Mouth: Mucous membranes are moist.      Pharynx: Oropharynx is clear. No oropharyngeal exudate or posterior oropharyngeal erythema.   Eyes:      General: No scleral icterus.     Extraocular Movements: Extraocular movements intact.      Conjunctiva/sclera: Conjunctivae normal.   Neck:      Musculoskeletal: Normal range of motion.   Cardiovascular:      Rate and Rhythm: Normal rate and regular rhythm.      Pulses: Normal pulses.      Heart sounds: Normal heart sounds.   Pulmonary:      Effort: Pulmonary effort is normal. No respiratory distress.      Breath sounds: Normal breath sounds.   Abdominal:      General: Bowel sounds are normal. There is no distension.      Palpations: Abdomen is soft. There is no mass.      Tenderness: There is no abdominal tenderness. There is no guarding.      Hernia: No hernia is present.   Musculoskeletal: Normal range of motion.         General: No deformity or signs of injury.      Comments: Gait normal    Skin:     General: Skin is warm and dry.      Findings: No rash.   Neurological:      General: No focal deficit present.      Mental Status: She is alert and oriented to person, place, and time. Mental status is at baseline.   Psychiatric:         Mood and Affect: Mood normal.         Behavior: Behavior normal.         Thought Content: Thought content normal.       Current Outpatient Medications on File Prior to Visit   Medication Sig Dispense Refill    acetaminophen (TYLENOL) 500 MG tablet Take 500 mg by mouth.      alirocumab (PRALUENT PEN) 150 mg/mL PnIj 150 mg.      apixaban (ELIQUIS) 5 mg Tab   See Instructions, 60 tab, 4, Substitution Allowed, TAKE ONE BY MOUTH TWICE  DAILY, 30, Route to Pharmacy Electronically, [facility], [id number], Instructions Replace Required Details, lon, 08/17/20 12:57:00 CDT, Measured hei...      aspirin (ECOTRIN) 81 MG EC tablet Take 81 mg by mouth once daily.      augmented betamethasone dipropionate (DIPROLENE-AF) 0.05 % ointment Apply topically 2 (two) times daily.      diclofenac sodium (VOLTAREN) 1 % Gel Apply 2 g topically once daily.      furosemide (LASIX) 20 MG tablet Take 20 mg by mouth daily as needed.       irbesartan-hydrochlorothiazide (AVALIDE) 150-12.5 mg per tablet   See Instructions, 90 tab, 4, 4, Substitution Allowed, TAKE ONE DAILY TO REPLACE AVAPRO AND HCTZ, Route to Pharmacy Electronically, [facility] ([place]), [id number], Instructions Replace Required Details, lon, 11/06/20 11:3...      levothyroxine (SYNTHROID) 25 MCG tablet Take 1 tablet (25 mcg total) by mouth before breakfast. 90 tablet 3    metoprolol succinate (TOPROL-XL) 50 MG 24 hr tablet Take 50 mg by mouth once daily.       sennosides/docusate sodium (COLACE 2-IN-1 ORAL) Take by mouth daily as needed.      EVOLOCUMAB (REPATHA SYRINGE SUBQ) Inject into the skin. Injection once every other week      [DISCONTINUED] amlodipine (NORVASC) 2.5 MG tablet Take 2.5 mg by mouth once daily.      [DISCONTINUED] apixaban (ELIQUIS) 5 mg Tab Take 5 mg by mouth.      [DISCONTINUED] cholecalciferol, vitamin D3, (VITAMIN D3) 2,000 unit Cap Take 1 capsule by mouth once daily.      [DISCONTINUED] irbesartan-hydrochlorothiazide (AVALIDE) 150-12.5 mg per tablet Take 1 tablet by mouth once daily.      [DISCONTINUED] losartan-hydrochlorothiazide 100-12.5 mg (HYZAAR) 100-12.5 mg Tab Take 1 tablet by mouth once daily.      [DISCONTINUED] phytonadione, vit K1, (PHYTONADIONE, VITAMIN K1,) 100 mcg Tab Take by mouth.      [DISCONTINUED] psyllium (METAMUCIL) packet Take 1 packet by mouth once daily.       No current  facility-administered medications on file prior to visit.          Assessment:       1. Physical exam, annual    2. Need for shingles vaccine    3. BOOP (bronchiolitis obliterans with organizing pneumonia)    4. Atherosclerosis of aorta    5. Carotid artery disease, unspecified laterality, unspecified type    6. Essential hypertension    7. Lipid disorder    8. Nonrheumatic aortic valve stenosis    9. Chronic fatigue    10. Sensorineural hearing loss (SNHL) of both ears        Plan:       Physical exam, annual  -     (In Office Administered) Pneumococcal Polysaccharide Vaccine (23 Valent) (SQ/IM)    Need for shingles vaccine   - Sent to pharmacy     BOOP (bronchiolitis obliterans with organizing pneumonia)   - Noted; stable    Atherosclerosis of aorta   - Noted; stable     Carotid artery disease, unspecified laterality, unspecified type   - Noted. stable  Essential hypertension   - Noted; stable     Lipid disorder   - Noted; stable     Nonrheumatic aortic valve stenosis   - Noted; stable     Chronic fatigue   - Noted; stable     Sensorineural hearing loss (SNHL) of both ears   - Noted; stable     Other orders  -     varicella-zoster [id number], PF, (SHINGRIX, PF,) 50 mcg/0.5 mL injection; Inject 0.5 mLs into the muscle once. for 1 dose  Dispense: 1 each; Refill: 1  -     Cancel: Pneumococcal Polysaccharide Vaccine (23 Valent) (SQ/IM); Future; Expected date: 11/20/2020

## 2020-11-20 ENCOUNTER — OFFICE VISIT (OUTPATIENT)
Dept: INTERNAL MEDICINE | Facility: CLINIC | Age: 85
End: 2020-11-20
Payer: MEDICARE

## 2020-11-20 VITALS
BODY MASS INDEX: 32.38 KG/M2 | HEIGHT: 63 IN | RESPIRATION RATE: 18 BRPM | DIASTOLIC BLOOD PRESSURE: 80 MMHG | TEMPERATURE: 98 F | SYSTOLIC BLOOD PRESSURE: 130 MMHG | WEIGHT: 182.75 LBS | HEART RATE: 80 BPM | OXYGEN SATURATION: 99 %

## 2020-11-20 DIAGNOSIS — I70.0 ATHEROSCLEROSIS OF AORTA: ICD-10-CM

## 2020-11-20 DIAGNOSIS — Z00.00 PHYSICAL EXAM, ANNUAL: Primary | ICD-10-CM

## 2020-11-20 DIAGNOSIS — R53.82 CHRONIC FATIGUE: ICD-10-CM

## 2020-11-20 DIAGNOSIS — J84.89 BOOP (BRONCHIOLITIS OBLITERANS WITH ORGANIZING PNEUMONIA): ICD-10-CM

## 2020-11-20 DIAGNOSIS — I10 ESSENTIAL HYPERTENSION: ICD-10-CM

## 2020-11-20 DIAGNOSIS — I77.9 CAROTID ARTERY DISEASE, UNSPECIFIED LATERALITY, UNSPECIFIED TYPE: ICD-10-CM

## 2020-11-20 DIAGNOSIS — E78.9 LIPID DISORDER: ICD-10-CM

## 2020-11-20 DIAGNOSIS — I35.0 NONRHEUMATIC AORTIC VALVE STENOSIS: ICD-10-CM

## 2020-11-20 DIAGNOSIS — H90.3 SENSORINEURAL HEARING LOSS (SNHL) OF BOTH EARS: ICD-10-CM

## 2020-11-20 DIAGNOSIS — Z23 NEED FOR SHINGLES VACCINE: ICD-10-CM

## 2020-11-20 PROCEDURE — 99999 PR PBB SHADOW E&M-EST. PATIENT-LVL IV: ICD-10-PCS | Mod: PBBFAC,,, | Performed by: STUDENT IN AN ORGANIZED HEALTH CARE EDUCATION/TRAINING PROGRAM

## 2020-11-20 PROCEDURE — 99214 OFFICE O/P EST MOD 30 MIN: CPT | Mod: PBBFAC,PO | Performed by: STUDENT IN AN ORGANIZED HEALTH CARE EDUCATION/TRAINING PROGRAM

## 2020-11-20 PROCEDURE — 99999 PR PBB SHADOW E&M-EST. PATIENT-LVL IV: CPT | Mod: PBBFAC,,, | Performed by: STUDENT IN AN ORGANIZED HEALTH CARE EDUCATION/TRAINING PROGRAM

## 2020-11-20 PROCEDURE — G0009 ADMIN PNEUMOCOCCAL VACCINE: HCPCS | Mod: PBBFAC,PO

## 2020-11-20 PROCEDURE — 99214 OFFICE O/P EST MOD 30 MIN: CPT | Mod: S$PBB,,, | Performed by: STUDENT IN AN ORGANIZED HEALTH CARE EDUCATION/TRAINING PROGRAM

## 2020-11-20 PROCEDURE — 99214 PR OFFICE/OUTPT VISIT, EST, LEVL IV, 30-39 MIN: ICD-10-PCS | Mod: S$PBB,,, | Performed by: STUDENT IN AN ORGANIZED HEALTH CARE EDUCATION/TRAINING PROGRAM

## 2020-11-20 RX ORDER — METOPROLOL SUCCINATE 50 MG/1
50 TABLET, EXTENDED RELEASE ORAL DAILY
COMMUNITY
Start: 2020-10-30 | End: 2021-04-12

## 2020-11-20 RX ORDER — ZOSTER VACCINE RECOMBINANT, ADJUVANTED 50 MCG/0.5
0.5 KIT INTRAMUSCULAR ONCE
Qty: 1 EACH | Refills: 1 | Status: SHIPPED | OUTPATIENT
Start: 2020-11-20 | End: 2020-11-20

## 2020-11-20 RX ORDER — ACETAMINOPHEN 500 MG
500 TABLET ORAL
COMMUNITY
Start: 2020-08-06

## 2020-11-20 RX ORDER — FUROSEMIDE 20 MG/1
20 TABLET ORAL DAILY PRN
COMMUNITY
Start: 2020-08-24 | End: 2021-04-12

## 2020-11-20 RX ORDER — ALIROCUMAB 150 MG/ML
150 INJECTION, SOLUTION SUBCUTANEOUS
COMMUNITY
Start: 2019-11-07 | End: 2021-07-01

## 2020-11-20 RX ORDER — IRBESARTAN AND HYDROCHLOROTHIAZIDE 150; 12.5 MG/1; MG/1
TABLET, FILM COATED ORAL
COMMUNITY
Start: 2013-08-27 | End: 2021-11-02

## 2020-11-20 RX ORDER — BETAMETHASONE DIPROPIONATE 0.5 MG/G
OINTMENT, AUGMENTED TOPICAL 2 TIMES DAILY
COMMUNITY
End: 2021-04-12

## 2020-11-20 RX ORDER — DICLOFENAC SODIUM 10 MG/G
2 GEL TOPICAL DAILY
COMMUNITY
End: 2021-04-12

## 2020-12-11 ENCOUNTER — PATIENT MESSAGE (OUTPATIENT)
Dept: OTHER | Facility: OTHER | Age: 85
End: 2020-12-11

## 2020-12-17 ENCOUNTER — PES CALL (OUTPATIENT)
Dept: ADMINISTRATIVE | Facility: CLINIC | Age: 85
End: 2020-12-17

## 2020-12-23 RX ORDER — BETAMETHASONE DIPROPIONATE 0.5 MG/G
OINTMENT, AUGMENTED TOPICAL 2 TIMES DAILY
Status: CANCELLED | OUTPATIENT
Start: 2020-12-23

## 2020-12-23 NOTE — TELEPHONE ENCOUNTER
----- Message from Yarelis Sánchez sent at 12/23/2020  7:41 AM CST -----  Regarding: Jimbo daughter   Requesting an RX refill or new RX.  Is this a refill or new RX: refill   RX name and strength: augmented betamethasone dipropionate (DIPROLENE-AF) 0.05 % ointment  Is this a 30 day or 90 day RX:   Pharmacy name and phone # (copy/paste from chart):  [facility] ([place]) - [address] [phone] (Phone)  [phone] (Fax)

## 2020-12-23 NOTE — TELEPHONE ENCOUNTER
I spoke to pt and she notified me the Diprolene ointment is used for vaginal irritation. She have been taking this medication for years. Pt was offered an appt. For exam to continue medication.    Pt will call back to schedule.    Sujata called and scheduled pt an appt for 1-6-2021. Sujata notified me pt's left foot third toe have black and blue fading bruise. Pt was at ENT appt. And the doctor examined the foot. He didn't think it was broken.  I added this comment to the visit.

## 2021-01-05 ENCOUNTER — PATIENT MESSAGE (OUTPATIENT)
Dept: ADMINISTRATIVE | Facility: OTHER | Age: 86
End: 2021-01-05

## 2021-01-13 ENCOUNTER — TELEPHONE (OUTPATIENT)
Dept: INTERNAL MEDICINE | Facility: CLINIC | Age: 86
End: 2021-01-13

## 2021-01-14 ENCOUNTER — TELEPHONE (OUTPATIENT)
Dept: INTERNAL MEDICINE | Facility: CLINIC | Age: 86
End: 2021-01-14

## 2021-01-14 ENCOUNTER — OFFICE VISIT (OUTPATIENT)
Dept: INTERNAL MEDICINE | Facility: CLINIC | Age: 86
End: 2021-01-14
Payer: MEDICARE

## 2021-01-14 VITALS
OXYGEN SATURATION: 98 % | HEART RATE: 101 BPM | RESPIRATION RATE: 16 BRPM | DIASTOLIC BLOOD PRESSURE: 68 MMHG | TEMPERATURE: 98 F | WEIGHT: 184.31 LBS | HEIGHT: 65 IN | BODY MASS INDEX: 30.71 KG/M2 | SYSTOLIC BLOOD PRESSURE: 136 MMHG

## 2021-01-14 DIAGNOSIS — R06.02 SHORTNESS OF BREATH: ICD-10-CM

## 2021-01-14 DIAGNOSIS — R68.83 CHILLS: ICD-10-CM

## 2021-01-14 DIAGNOSIS — M79.10 MUSCLE PAIN: ICD-10-CM

## 2021-01-14 DIAGNOSIS — R05.9 COUGH: ICD-10-CM

## 2021-01-14 DIAGNOSIS — Z20.822 ENCOUNTER FOR LABORATORY TESTING FOR COVID-19 VIRUS: ICD-10-CM

## 2021-01-14 PROCEDURE — U0003 INFECTIOUS AGENT DETECTION BY NUCLEIC ACID (DNA OR RNA); SEVERE ACUTE RESPIRATORY SYNDROME CORONAVIRUS 2 (SARS-COV-2) (CORONAVIRUS DISEASE [COVID-19]), AMPLIFIED PROBE TECHNIQUE, MAKING USE OF HIGH THROUGHPUT TECHNOLOGIES AS DESCRIBED BY CMS-2020-01-R: HCPCS

## 2021-01-14 PROCEDURE — 99214 PR OFFICE/OUTPT VISIT, EST, LEVL IV, 30-39 MIN: ICD-10-PCS | Mod: S$PBB,,, | Performed by: STUDENT IN AN ORGANIZED HEALTH CARE EDUCATION/TRAINING PROGRAM

## 2021-01-14 PROCEDURE — 99999 PR PBB SHADOW E&M-EST. PATIENT-LVL IV: ICD-10-PCS | Mod: PBBFAC,,, | Performed by: STUDENT IN AN ORGANIZED HEALTH CARE EDUCATION/TRAINING PROGRAM

## 2021-01-14 PROCEDURE — 99214 OFFICE O/P EST MOD 30 MIN: CPT | Mod: S$PBB,,, | Performed by: STUDENT IN AN ORGANIZED HEALTH CARE EDUCATION/TRAINING PROGRAM

## 2021-01-14 PROCEDURE — 99214 OFFICE O/P EST MOD 30 MIN: CPT | Mod: PBBFAC,PO | Performed by: STUDENT IN AN ORGANIZED HEALTH CARE EDUCATION/TRAINING PROGRAM

## 2021-01-14 PROCEDURE — 99999 PR PBB SHADOW E&M-EST. PATIENT-LVL IV: CPT | Mod: PBBFAC,,, | Performed by: STUDENT IN AN ORGANIZED HEALTH CARE EDUCATION/TRAINING PROGRAM

## 2021-01-15 ENCOUNTER — TELEPHONE (OUTPATIENT)
Dept: INTERNAL MEDICINE | Facility: CLINIC | Age: 86
End: 2021-01-15

## 2021-01-15 DIAGNOSIS — U07.1 COVID-19 VIRUS DETECTED: ICD-10-CM

## 2021-01-15 LAB — SARS-COV-2 RNA RESP QL NAA+PROBE: DETECTED

## 2021-02-05 ENCOUNTER — PATIENT MESSAGE (OUTPATIENT)
Dept: INTERNAL MEDICINE | Facility: CLINIC | Age: 86
End: 2021-02-05

## 2021-02-08 ENCOUNTER — PATIENT MESSAGE (OUTPATIENT)
Dept: INTERNAL MEDICINE | Facility: CLINIC | Age: 86
End: 2021-02-08

## 2021-02-09 ENCOUNTER — PATIENT MESSAGE (OUTPATIENT)
Dept: INTERNAL MEDICINE | Facility: CLINIC | Age: 86
End: 2021-02-09

## 2021-02-09 ENCOUNTER — PATIENT MESSAGE (OUTPATIENT)
Dept: ADMINISTRATIVE | Facility: OTHER | Age: 86
End: 2021-02-09

## 2021-02-10 ENCOUNTER — PATIENT MESSAGE (OUTPATIENT)
Dept: INTERNAL MEDICINE | Facility: CLINIC | Age: 86
End: 2021-02-10

## 2021-02-10 ENCOUNTER — OFFICE VISIT (OUTPATIENT)
Dept: URGENT CARE | Facility: CLINIC | Age: 86
End: 2021-02-10
Payer: MEDICARE

## 2021-02-10 VITALS
DIASTOLIC BLOOD PRESSURE: 71 MMHG | BODY MASS INDEX: 30.79 KG/M2 | OXYGEN SATURATION: 97 % | HEART RATE: 91 BPM | SYSTOLIC BLOOD PRESSURE: 137 MMHG | RESPIRATION RATE: 14 BRPM | TEMPERATURE: 98 F | WEIGHT: 185 LBS

## 2021-02-10 DIAGNOSIS — R06.02 SOB (SHORTNESS OF BREATH): Primary | ICD-10-CM

## 2021-02-10 DIAGNOSIS — R53.83 FATIGUE, UNSPECIFIED TYPE: ICD-10-CM

## 2021-02-10 PROCEDURE — 99214 PR OFFICE/OUTPT VISIT, EST, LEVL IV, 30-39 MIN: ICD-10-PCS | Mod: S$GLB,,, | Performed by: INTERNAL MEDICINE

## 2021-02-10 PROCEDURE — 71046 XR CHEST PA AND LATERAL: ICD-10-PCS | Mod: FY,S$GLB,, | Performed by: RADIOLOGY

## 2021-02-10 PROCEDURE — 99214 OFFICE O/P EST MOD 30 MIN: CPT | Mod: S$GLB,,, | Performed by: INTERNAL MEDICINE

## 2021-02-10 PROCEDURE — 71046 X-RAY EXAM CHEST 2 VIEWS: CPT | Mod: FY,S$GLB,, | Performed by: RADIOLOGY

## 2021-02-17 ENCOUNTER — PES CALL (OUTPATIENT)
Dept: ADMINISTRATIVE | Facility: CLINIC | Age: 86
End: 2021-02-17

## 2021-02-19 ENCOUNTER — OFFICE VISIT (OUTPATIENT)
Dept: HOME HEALTH SERVICES | Facility: CLINIC | Age: 86
End: 2021-02-19
Payer: MEDICARE

## 2021-02-19 VITALS
BODY MASS INDEX: 29.99 KG/M2 | SYSTOLIC BLOOD PRESSURE: 113 MMHG | HEART RATE: 84 BPM | TEMPERATURE: 98 F | DIASTOLIC BLOOD PRESSURE: 63 MMHG | WEIGHT: 180 LBS | HEIGHT: 65 IN

## 2021-02-19 DIAGNOSIS — I27.20 PULMONARY HYPERTENSION: ICD-10-CM

## 2021-02-19 DIAGNOSIS — I10 ESSENTIAL HYPERTENSION: ICD-10-CM

## 2021-02-19 DIAGNOSIS — I48.20 CHRONIC ATRIAL FIBRILLATION: ICD-10-CM

## 2021-02-19 DIAGNOSIS — Z74.09 OTHER REDUCED MOBILITY: ICD-10-CM

## 2021-02-19 DIAGNOSIS — I65.23 BILATERAL CAROTID ARTERY STENOSIS: ICD-10-CM

## 2021-02-19 DIAGNOSIS — Z00.00 ENCOUNTER FOR PREVENTIVE HEALTH EXAMINATION: Primary | ICD-10-CM

## 2021-02-19 DIAGNOSIS — I25.119 ATHEROSCLEROSIS OF NATIVE CORONARY ARTERY OF NATIVE HEART WITH ANGINA PECTORIS: ICD-10-CM

## 2021-02-19 DIAGNOSIS — R26.9 ABNORMALITY OF GAIT AND MOBILITY: ICD-10-CM

## 2021-02-19 DIAGNOSIS — I70.0 ATHEROSCLEROSIS OF AORTA: ICD-10-CM

## 2021-02-19 DIAGNOSIS — I35.0 NONRHEUMATIC AORTIC VALVE STENOSIS: ICD-10-CM

## 2021-02-19 DIAGNOSIS — H90.3 SENSORINEURAL HEARING LOSS (SNHL) OF BOTH EARS: ICD-10-CM

## 2021-02-19 DIAGNOSIS — Z99.89 DEPENDENCE ON OTHER ENABLING MACHINES AND DEVICES: ICD-10-CM

## 2021-02-19 PROCEDURE — G0439 PR MEDICARE ANNUAL WELLNESS SUBSEQUENT VISIT: ICD-10-PCS | Mod: S$GLB,,, | Performed by: NURSE PRACTITIONER

## 2021-02-19 PROCEDURE — G0439 PPPS, SUBSEQ VISIT: HCPCS | Mod: S$GLB,,, | Performed by: NURSE PRACTITIONER

## 2021-02-20 PROBLEM — I27.20 PULMONARY HYPERTENSION: Status: ACTIVE | Noted: 2021-02-20

## 2021-02-20 PROBLEM — I48.20 CHRONIC ATRIAL FIBRILLATION: Status: ACTIVE | Noted: 2021-02-20

## 2021-02-20 PROBLEM — I25.119 ATHEROSCLEROSIS OF NATIVE CORONARY ARTERY OF NATIVE HEART WITH ANGINA PECTORIS: Status: ACTIVE | Noted: 2021-02-20

## 2021-03-16 RX ORDER — APIXABAN 5 MG/1
TABLET, FILM COATED ORAL
Qty: 60 TABLET | Refills: 11 | Status: SHIPPED | OUTPATIENT
Start: 2021-03-16 | End: 2022-03-15

## 2021-04-12 ENCOUNTER — OFFICE VISIT (OUTPATIENT)
Dept: CARDIOLOGY | Facility: CLINIC | Age: 86
End: 2021-04-12
Payer: MEDICARE

## 2021-04-12 ENCOUNTER — HOSPITAL ENCOUNTER (OUTPATIENT)
Dept: RADIOLOGY | Facility: HOSPITAL | Age: 86
Discharge: HOME | End: 2021-04-12
Attending: INTERNAL MEDICINE
Payer: MEDICARE

## 2021-04-12 ENCOUNTER — PATIENT OUTREACH (OUTPATIENT)
Dept: ADMINISTRATIVE | Facility: OTHER | Age: 86
End: 2021-04-12

## 2021-04-12 ENCOUNTER — TELEPHONE (OUTPATIENT)
Dept: CARDIOLOGY | Facility: CLINIC | Age: 86
End: 2021-04-12

## 2021-04-12 VITALS
HEIGHT: 65 IN | WEIGHT: 185.63 LBS | SYSTOLIC BLOOD PRESSURE: 138 MMHG | DIASTOLIC BLOOD PRESSURE: 73 MMHG | HEART RATE: 81 BPM | BODY MASS INDEX: 30.93 KG/M2

## 2021-04-12 DIAGNOSIS — I10 ESSENTIAL HYPERTENSION: ICD-10-CM

## 2021-04-12 DIAGNOSIS — Z95.828 HISTORY OF LEFT COMMON CAROTID ARTERY STENT PLACEMENT: Chronic | ICD-10-CM

## 2021-04-12 DIAGNOSIS — Z95.1 HISTORY OF CORONARY ARTERY BYPASS GRAFT: ICD-10-CM

## 2021-04-12 DIAGNOSIS — I25.10 ARTERIOSCLEROSIS OF CORONARY ARTERY: Chronic | ICD-10-CM

## 2021-04-12 DIAGNOSIS — Z98.890 HISTORY OF LEFT COMMON CAROTID ARTERY STENT PLACEMENT: Chronic | ICD-10-CM

## 2021-04-12 DIAGNOSIS — Z79.01 ANTICOAGULATION MANAGEMENT ENCOUNTER: Chronic | ICD-10-CM

## 2021-04-12 DIAGNOSIS — I70.0 ATHEROSCLEROSIS OF AORTA: Chronic | ICD-10-CM

## 2021-04-12 DIAGNOSIS — Z95.2 S/P TAVR (TRANSCATHETER AORTIC VALVE REPLACEMENT): Chronic | ICD-10-CM

## 2021-04-12 DIAGNOSIS — Z78.9 STATIN INTOLERANCE: Chronic | ICD-10-CM

## 2021-04-12 DIAGNOSIS — I65.23 BILATERAL CAROTID ARTERY STENOSIS: ICD-10-CM

## 2021-04-12 DIAGNOSIS — I35.0 NONRHEUMATIC AORTIC VALVE STENOSIS: Chronic | ICD-10-CM

## 2021-04-12 DIAGNOSIS — I50.32 CHRONIC DIASTOLIC HEART FAILURE: ICD-10-CM

## 2021-04-12 DIAGNOSIS — I48.20 CHRONIC ATRIAL FIBRILLATION: Chronic | ICD-10-CM

## 2021-04-12 DIAGNOSIS — E78.00 PURE HYPERCHOLESTEROLEMIA: ICD-10-CM

## 2021-04-12 DIAGNOSIS — I50.32 CHRONIC DIASTOLIC HEART FAILURE: Primary | Chronic | ICD-10-CM

## 2021-04-12 DIAGNOSIS — Z51.81 ANTICOAGULATION MANAGEMENT ENCOUNTER: Chronic | ICD-10-CM

## 2021-04-12 PROBLEM — Z95.3 S/P TAVR (TRANSCATHETER AORTIC VALVE REPLACEMENT): Status: ACTIVE | Noted: 2021-04-12

## 2021-04-12 PROBLEM — I95.1 ORTHOSTATIC HYPOTENSION: Status: ACTIVE | Noted: 2021-04-12

## 2021-04-12 PROCEDURE — 99214 OFFICE O/P EST MOD 30 MIN: CPT | Mod: S$PBB,,, | Performed by: INTERNAL MEDICINE

## 2021-04-12 PROCEDURE — 99214 PR OFFICE/OUTPT VISIT, EST, LEVL IV, 30-39 MIN: ICD-10-PCS | Mod: S$PBB,,, | Performed by: INTERNAL MEDICINE

## 2021-04-12 PROCEDURE — 71046 X-RAY EXAM CHEST 2 VIEWS: CPT | Mod: TC,PO

## 2021-04-12 PROCEDURE — 99999 PR PBB SHADOW E&M-EST. PATIENT-LVL IV: ICD-10-PCS | Mod: PBBFAC,,, | Performed by: INTERNAL MEDICINE

## 2021-04-12 PROCEDURE — 99999 PR PBB SHADOW E&M-EST. PATIENT-LVL IV: CPT | Mod: PBBFAC,,, | Performed by: INTERNAL MEDICINE

## 2021-04-12 PROCEDURE — 99214 OFFICE O/P EST MOD 30 MIN: CPT | Mod: PBBFAC,PO | Performed by: INTERNAL MEDICINE

## 2021-04-12 PROCEDURE — 71046 X-RAY EXAM CHEST 2 VIEWS: CPT | Mod: 26,,, | Performed by: RADIOLOGY

## 2021-04-12 PROCEDURE — 71046 XR CHEST PA AND LATERAL: ICD-10-PCS | Mod: 26,,, | Performed by: RADIOLOGY

## 2021-04-12 RX ORDER — METOPROLOL SUCCINATE 50 MG/1
50 TABLET, EXTENDED RELEASE ORAL DAILY
Qty: 90 TABLET | Refills: 3 | Status: SHIPPED | OUTPATIENT
Start: 2021-04-12 | End: 2022-02-11

## 2021-04-13 ENCOUNTER — PATIENT MESSAGE (OUTPATIENT)
Dept: INTERNAL MEDICINE | Facility: CLINIC | Age: 86
End: 2021-04-13

## 2021-04-13 ENCOUNTER — PATIENT MESSAGE (OUTPATIENT)
Dept: ADMINISTRATIVE | Facility: OTHER | Age: 86
End: 2021-04-13

## 2021-04-15 ENCOUNTER — HOSPITAL ENCOUNTER (OUTPATIENT)
Dept: CARDIOLOGY | Facility: HOSPITAL | Age: 86
Discharge: HOME | End: 2021-04-15
Attending: INTERNAL MEDICINE
Payer: MEDICARE

## 2021-04-15 DIAGNOSIS — I48.20 CHRONIC ATRIAL FIBRILLATION: ICD-10-CM

## 2021-04-15 PROCEDURE — 93227 HOLTER MONITOR - 24 HOUR (CUPID ONLY): ICD-10-PCS | Mod: ,,, | Performed by: INTERNAL MEDICINE

## 2021-04-15 PROCEDURE — 93227 XTRNL ECG REC<48 HR R&I: CPT | Mod: ,,, | Performed by: INTERNAL MEDICINE

## 2021-04-15 PROCEDURE — 93226 XTRNL ECG REC<48 HR SCAN A/R: CPT

## 2021-04-16 ENCOUNTER — PATIENT MESSAGE (OUTPATIENT)
Dept: CARDIOLOGY | Facility: CLINIC | Age: 86
End: 2021-04-16

## 2021-04-16 LAB
OHS CV EVENT MONITOR DAY: 0
OHS CV HOLTER LENGTH DECIMAL HOURS: 23.98
OHS CV HOLTER LENGTH HOURS: 23
OHS CV HOLTER LENGTH MINUTES: 59

## 2021-04-18 ENCOUNTER — IMMUNIZATION (OUTPATIENT)
Dept: PRIMARY CARE CLINIC | Facility: CLINIC | Age: 86
End: 2021-04-18
Payer: MEDICARE

## 2021-04-18 DIAGNOSIS — Z23 NEED FOR VACCINATION: Primary | ICD-10-CM

## 2021-04-18 PROCEDURE — 91300 PR SARS-COV- 2 COVID-19 VACCINE, NO PRSV, 30MCG/0.3ML, IM: ICD-10-PCS | Mod: S$GLB,,, | Performed by: INTERNAL MEDICINE

## 2021-04-18 PROCEDURE — 0001A PR IMMUNIZ ADMIN, SARS-COV-2 COVID-19 VACC, 30MCG/0.3ML, 1ST DOSE: CPT | Mod: CV19,S$GLB,, | Performed by: INTERNAL MEDICINE

## 2021-04-18 PROCEDURE — 0001A PR IMMUNIZ ADMIN, SARS-COV-2 COVID-19 VACC, 30MCG/0.3ML, 1ST DOSE: ICD-10-PCS | Mod: CV19,S$GLB,, | Performed by: INTERNAL MEDICINE

## 2021-04-18 PROCEDURE — 91300 PR SARS-COV- 2 COVID-19 VACCINE, NO PRSV, 30MCG/0.3ML, IM: CPT | Mod: S$GLB,,, | Performed by: INTERNAL MEDICINE

## 2021-04-18 RX ADMIN — Medication 0.3 ML: at 01:04

## 2021-05-08 ENCOUNTER — IMMUNIZATION (OUTPATIENT)
Dept: PRIMARY CARE CLINIC | Facility: CLINIC | Age: 86
End: 2021-05-08
Payer: MEDICARE

## 2021-05-08 DIAGNOSIS — Z23 NEED FOR VACCINATION: Primary | ICD-10-CM

## 2021-05-08 PROCEDURE — 91300 PR SARS-COV- 2 COVID-19 VACCINE, NO PRSV, 30MCG/0.3ML, IM: CPT | Mod: PBBFAC | Performed by: INTERNAL MEDICINE

## 2021-05-08 PROCEDURE — 0002A PR IMMUNIZ ADMIN, SARS-COV-2 COVID-19 VACC, 30MCG/0.3ML, 2ND DOSE: CPT | Mod: PBBFAC | Performed by: INTERNAL MEDICINE

## 2021-05-08 RX ADMIN — RNA INGREDIENT BNT-162B2 0.3 ML: 0.23 INJECTION, SUSPENSION INTRAMUSCULAR at 09:05

## 2021-07-01 ENCOUNTER — SPECIALTY PHARMACY (OUTPATIENT)
Dept: PHARMACY | Facility: CLINIC | Age: 86
End: 2021-07-01

## 2021-07-08 DIAGNOSIS — E78.00 PURE HYPERCHOLESTEROLEMIA: Primary | ICD-10-CM

## 2021-07-08 DIAGNOSIS — Z78.9 STATIN INTOLERANCE: ICD-10-CM

## 2021-07-08 DIAGNOSIS — Z95.1 HISTORY OF CORONARY ARTERY BYPASS GRAFT: ICD-10-CM

## 2021-07-12 ENCOUNTER — PATIENT MESSAGE (OUTPATIENT)
Dept: CARDIOLOGY | Facility: CLINIC | Age: 86
End: 2021-07-12

## 2021-07-20 DIAGNOSIS — E78.00 PURE HYPERCHOLESTEROLEMIA: ICD-10-CM

## 2021-07-20 DIAGNOSIS — Z95.1 HISTORY OF CORONARY ARTERY BYPASS GRAFT: ICD-10-CM

## 2021-07-20 DIAGNOSIS — Z78.9 STATIN INTOLERANCE: ICD-10-CM

## 2021-08-09 ENCOUNTER — PATIENT MESSAGE (OUTPATIENT)
Dept: CARDIOLOGY | Facility: CLINIC | Age: 86
End: 2021-08-09

## 2021-08-10 ENCOUNTER — PATIENT MESSAGE (OUTPATIENT)
Dept: CARDIOLOGY | Facility: CLINIC | Age: 86
End: 2021-08-10

## 2021-08-12 ENCOUNTER — PATIENT OUTREACH (OUTPATIENT)
Dept: ADMINISTRATIVE | Facility: OTHER | Age: 86
End: 2021-08-12

## 2021-08-12 PROBLEM — I25.10 ARTERIOSCLEROSIS OF CORONARY ARTERY: Chronic | Status: ACTIVE | Noted: 2021-02-20

## 2021-08-12 PROBLEM — Z95.2 S/P TAVR (TRANSCATHETER AORTIC VALVE REPLACEMENT): Chronic | Status: ACTIVE | Noted: 2021-04-12

## 2021-08-12 PROBLEM — Z95.1 HISTORY OF CORONARY ARTERY BYPASS GRAFT: Chronic | Status: ACTIVE | Noted: 2021-04-12

## 2021-08-12 PROBLEM — I70.0 ATHEROSCLEROSIS OF AORTA: Chronic | Status: ACTIVE | Noted: 2019-09-16

## 2021-08-12 PROBLEM — I48.20 CHRONIC ATRIAL FIBRILLATION: Chronic | Status: ACTIVE | Noted: 2021-02-20

## 2021-08-12 PROBLEM — E78.00 PURE HYPERCHOLESTEROLEMIA: Chronic | Status: ACTIVE | Noted: 2021-04-12

## 2021-08-12 PROBLEM — I95.1 ORTHOSTATIC HYPOTENSION: Chronic | Status: ACTIVE | Noted: 2021-04-12

## 2021-08-12 PROBLEM — Z95.3 S/P TAVR (TRANSCATHETER AORTIC VALVE REPLACEMENT): Chronic | Status: ACTIVE | Noted: 2021-04-12

## 2021-08-12 PROBLEM — I35.0 NONRHEUMATIC AORTIC VALVE STENOSIS: Chronic | Status: ACTIVE | Noted: 2018-09-25

## 2021-08-13 ENCOUNTER — PATIENT MESSAGE (OUTPATIENT)
Dept: INTERNAL MEDICINE | Facility: CLINIC | Age: 86
End: 2021-08-13

## 2021-08-13 ENCOUNTER — OFFICE VISIT (OUTPATIENT)
Dept: CARDIOLOGY | Facility: CLINIC | Age: 86
End: 2021-08-13
Payer: MEDICARE

## 2021-08-13 VITALS
DIASTOLIC BLOOD PRESSURE: 57 MMHG | BODY MASS INDEX: 31.85 KG/M2 | WEIGHT: 191.13 LBS | HEIGHT: 65 IN | SYSTOLIC BLOOD PRESSURE: 98 MMHG | HEART RATE: 80 BPM

## 2021-08-13 DIAGNOSIS — Z51.81 ANTICOAGULATION MANAGEMENT ENCOUNTER: ICD-10-CM

## 2021-08-13 DIAGNOSIS — Z95.1 HISTORY OF CORONARY ARTERY BYPASS GRAFT: ICD-10-CM

## 2021-08-13 DIAGNOSIS — E78.00 PURE HYPERCHOLESTEROLEMIA: ICD-10-CM

## 2021-08-13 DIAGNOSIS — I48.20 CHRONIC ATRIAL FIBRILLATION: Chronic | ICD-10-CM

## 2021-08-13 DIAGNOSIS — Z78.9 STATIN INTOLERANCE: ICD-10-CM

## 2021-08-13 DIAGNOSIS — I70.0 ATHEROSCLEROSIS OF AORTA: ICD-10-CM

## 2021-08-13 DIAGNOSIS — Z95.2 S/P TAVR (TRANSCATHETER AORTIC VALVE REPLACEMENT): Chronic | ICD-10-CM

## 2021-08-13 DIAGNOSIS — Z98.890 HISTORY OF LEFT COMMON CAROTID ARTERY STENT PLACEMENT: ICD-10-CM

## 2021-08-13 DIAGNOSIS — I35.0 NONRHEUMATIC AORTIC VALVE STENOSIS: ICD-10-CM

## 2021-08-13 DIAGNOSIS — I10 ESSENTIAL HYPERTENSION: Chronic | ICD-10-CM

## 2021-08-13 DIAGNOSIS — I65.23 BILATERAL CAROTID ARTERY STENOSIS: ICD-10-CM

## 2021-08-13 DIAGNOSIS — Z95.828 HISTORY OF LEFT COMMON CAROTID ARTERY STENT PLACEMENT: ICD-10-CM

## 2021-08-13 DIAGNOSIS — E03.9 ACQUIRED HYPOTHYROIDISM: ICD-10-CM

## 2021-08-13 DIAGNOSIS — Z79.01 ANTICOAGULATION MANAGEMENT ENCOUNTER: ICD-10-CM

## 2021-08-13 DIAGNOSIS — I25.10 ARTERIOSCLEROSIS OF CORONARY ARTERY: Primary | Chronic | ICD-10-CM

## 2021-08-13 DIAGNOSIS — I95.1 ORTHOSTATIC HYPOTENSION: ICD-10-CM

## 2021-08-13 PROCEDURE — 99214 PR OFFICE/OUTPT VISIT, EST, LEVL IV, 30-39 MIN: ICD-10-PCS | Mod: S$PBB,,, | Performed by: INTERNAL MEDICINE

## 2021-08-13 PROCEDURE — 99214 OFFICE O/P EST MOD 30 MIN: CPT | Mod: PBBFAC,PO | Performed by: INTERNAL MEDICINE

## 2021-08-13 PROCEDURE — 99999 PR PBB SHADOW E&M-EST. PATIENT-LVL IV: ICD-10-PCS | Mod: PBBFAC,,, | Performed by: INTERNAL MEDICINE

## 2021-08-13 PROCEDURE — 99999 PR PBB SHADOW E&M-EST. PATIENT-LVL IV: CPT | Mod: PBBFAC,,, | Performed by: INTERNAL MEDICINE

## 2021-08-13 PROCEDURE — 99214 OFFICE O/P EST MOD 30 MIN: CPT | Mod: S$PBB,,, | Performed by: INTERNAL MEDICINE

## 2021-08-13 RX ORDER — FUROSEMIDE 20 MG/1
20 TABLET ORAL
COMMUNITY
End: 2023-01-10

## 2021-08-25 ENCOUNTER — PATIENT MESSAGE (OUTPATIENT)
Dept: CARDIOLOGY | Facility: CLINIC | Age: 86
End: 2021-08-25

## 2021-11-01 ENCOUNTER — PATIENT MESSAGE (OUTPATIENT)
Dept: INTERNAL MEDICINE | Facility: CLINIC | Age: 86
End: 2021-11-01
Payer: MEDICARE

## 2021-11-01 DIAGNOSIS — R73.01 ABNORMAL FASTING GLUCOSE: ICD-10-CM

## 2021-11-01 DIAGNOSIS — Z00.00 PHYSICAL EXAM, ANNUAL: Primary | ICD-10-CM

## 2021-11-01 DIAGNOSIS — E55.9 VITAMIN D DEFICIENCY: ICD-10-CM

## 2021-11-01 DIAGNOSIS — I10 ESSENTIAL HYPERTENSION: ICD-10-CM

## 2021-11-09 ENCOUNTER — LAB VISIT (OUTPATIENT)
Dept: LAB | Facility: HOSPITAL | Age: 86
End: 2021-11-09
Attending: STUDENT IN AN ORGANIZED HEALTH CARE EDUCATION/TRAINING PROGRAM
Payer: MEDICARE

## 2021-11-09 DIAGNOSIS — R73.01 ABNORMAL FASTING GLUCOSE: ICD-10-CM

## 2021-11-09 DIAGNOSIS — I10 ESSENTIAL HYPERTENSION: ICD-10-CM

## 2021-11-09 DIAGNOSIS — Z00.00 PHYSICAL EXAM, ANNUAL: ICD-10-CM

## 2021-11-09 DIAGNOSIS — E55.9 VITAMIN D DEFICIENCY: ICD-10-CM

## 2021-11-09 LAB
25(OH)D3+25(OH)D2 SERPL-MCNC: 24 NG/ML (ref 30–96)
BASOPHILS # BLD AUTO: 0.02 K/UL (ref 0–0.2)
BASOPHILS NFR BLD: 0.5 % (ref 0–1.9)
DIFFERENTIAL METHOD: ABNORMAL
EOSINOPHIL # BLD AUTO: 0 K/UL (ref 0–0.5)
EOSINOPHIL NFR BLD: 0.9 % (ref 0–8)
ERYTHROCYTE [DISTWIDTH] IN BLOOD BY AUTOMATED COUNT: 13.7 % (ref 11.5–14.5)
ESTIMATED AVG GLUCOSE: 117 MG/DL (ref 68–131)
HBA1C MFR BLD: 5.7 % (ref 4–5.6)
HCT VFR BLD AUTO: 41.9 % (ref 37–48.5)
HGB BLD-MCNC: 12.9 G/DL (ref 12–16)
IMM GRANULOCYTES # BLD AUTO: 0.01 K/UL (ref 0–0.04)
IMM GRANULOCYTES NFR BLD AUTO: 0.2 % (ref 0–0.5)
LYMPHOCYTES # BLD AUTO: 1.8 K/UL (ref 1–4.8)
LYMPHOCYTES NFR BLD: 40.5 % (ref 18–48)
MCH RBC QN AUTO: 28 PG (ref 27–31)
MCHC RBC AUTO-ENTMCNC: 30.8 G/DL (ref 32–36)
MCV RBC AUTO: 91 FL (ref 82–98)
MONOCYTES # BLD AUTO: 0.4 K/UL (ref 0.3–1)
MONOCYTES NFR BLD: 9.3 % (ref 4–15)
NEUTROPHILS # BLD AUTO: 2.1 K/UL (ref 1.8–7.7)
NEUTROPHILS NFR BLD: 48.6 % (ref 38–73)
NRBC BLD-RTO: 0 /100 WBC
PLATELET # BLD AUTO: 211 K/UL (ref 150–450)
PMV BLD AUTO: 12.4 FL (ref 9.2–12.9)
RBC # BLD AUTO: 4.6 M/UL (ref 4–5.4)
WBC # BLD AUTO: 4.4 K/UL (ref 3.9–12.7)

## 2021-11-09 PROCEDURE — 36415 COLL VENOUS BLD VENIPUNCTURE: CPT | Mod: PO | Performed by: STUDENT IN AN ORGANIZED HEALTH CARE EDUCATION/TRAINING PROGRAM

## 2021-11-09 PROCEDURE — 83036 HEMOGLOBIN GLYCOSYLATED A1C: CPT | Performed by: STUDENT IN AN ORGANIZED HEALTH CARE EDUCATION/TRAINING PROGRAM

## 2021-11-09 PROCEDURE — 85025 COMPLETE CBC W/AUTO DIFF WBC: CPT | Performed by: STUDENT IN AN ORGANIZED HEALTH CARE EDUCATION/TRAINING PROGRAM

## 2021-11-09 PROCEDURE — 82306 VITAMIN D 25 HYDROXY: CPT | Performed by: STUDENT IN AN ORGANIZED HEALTH CARE EDUCATION/TRAINING PROGRAM

## 2021-11-19 ENCOUNTER — OFFICE VISIT (OUTPATIENT)
Dept: INTERNAL MEDICINE | Facility: CLINIC | Age: 86
End: 2021-11-19
Payer: MEDICARE

## 2021-11-19 VITALS
OXYGEN SATURATION: 99 % | SYSTOLIC BLOOD PRESSURE: 124 MMHG | BODY MASS INDEX: 37.09 KG/M2 | DIASTOLIC BLOOD PRESSURE: 84 MMHG | HEIGHT: 60 IN | HEART RATE: 96 BPM | WEIGHT: 188.94 LBS | RESPIRATION RATE: 18 BRPM | TEMPERATURE: 97 F

## 2021-11-19 DIAGNOSIS — R26.89 BALANCE DISORDER: ICD-10-CM

## 2021-11-19 DIAGNOSIS — Z00.00 PHYSICAL EXAM, ANNUAL: Primary | ICD-10-CM

## 2021-11-19 PROCEDURE — 99999 PR PBB SHADOW E&M-EST. PATIENT-LVL IV: ICD-10-PCS | Mod: PBBFAC,,, | Performed by: STUDENT IN AN ORGANIZED HEALTH CARE EDUCATION/TRAINING PROGRAM

## 2021-11-19 PROCEDURE — 99214 OFFICE O/P EST MOD 30 MIN: CPT | Mod: S$PBB,,, | Performed by: STUDENT IN AN ORGANIZED HEALTH CARE EDUCATION/TRAINING PROGRAM

## 2021-11-19 PROCEDURE — 99999 PR PBB SHADOW E&M-EST. PATIENT-LVL IV: CPT | Mod: PBBFAC,,, | Performed by: STUDENT IN AN ORGANIZED HEALTH CARE EDUCATION/TRAINING PROGRAM

## 2021-11-19 PROCEDURE — 99214 PR OFFICE/OUTPT VISIT, EST, LEVL IV, 30-39 MIN: ICD-10-PCS | Mod: S$PBB,,, | Performed by: STUDENT IN AN ORGANIZED HEALTH CARE EDUCATION/TRAINING PROGRAM

## 2021-11-19 PROCEDURE — 99214 OFFICE O/P EST MOD 30 MIN: CPT | Mod: PBBFAC,PO | Performed by: STUDENT IN AN ORGANIZED HEALTH CARE EDUCATION/TRAINING PROGRAM

## 2021-11-19 RX ORDER — ERGOCALCIFEROL 1.25 MG/1
50000 CAPSULE ORAL
Qty: 12 CAPSULE | Refills: 5 | Status: SHIPPED | OUTPATIENT
Start: 2021-11-19 | End: 2023-01-13

## 2021-12-07 ENCOUNTER — CLINICAL SUPPORT (OUTPATIENT)
Dept: REHABILITATION | Facility: HOSPITAL | Age: 86
End: 2021-12-07
Attending: STUDENT IN AN ORGANIZED HEALTH CARE EDUCATION/TRAINING PROGRAM
Payer: MEDICARE

## 2021-12-07 DIAGNOSIS — R26.89 BALANCE DISORDER: ICD-10-CM

## 2021-12-07 PROCEDURE — 97161 PT EVAL LOW COMPLEX 20 MIN: CPT | Mod: PO

## 2021-12-14 ENCOUNTER — LAB VISIT (OUTPATIENT)
Dept: LAB | Facility: HOSPITAL | Age: 86
End: 2021-12-14
Attending: INTERNAL MEDICINE
Payer: MEDICARE

## 2021-12-14 DIAGNOSIS — E78.00 PURE HYPERCHOLESTEROLEMIA: ICD-10-CM

## 2021-12-14 DIAGNOSIS — E03.9 ACQUIRED HYPOTHYROIDISM: ICD-10-CM

## 2021-12-14 DIAGNOSIS — I10 ESSENTIAL HYPERTENSION: Chronic | ICD-10-CM

## 2021-12-14 LAB
ANION GAP SERPL CALC-SCNC: 12 MMOL/L (ref 8–16)
BUN SERPL-MCNC: 11 MG/DL (ref 8–23)
CALCIUM SERPL-MCNC: 10.1 MG/DL (ref 8.7–10.5)
CHLORIDE SERPL-SCNC: 102 MMOL/L (ref 95–110)
CHOLEST SERPL-MCNC: 148 MG/DL (ref 120–199)
CHOLEST/HDLC SERPL: 3 {RATIO} (ref 2–5)
CO2 SERPL-SCNC: 24 MMOL/L (ref 23–29)
CREAT SERPL-MCNC: 0.7 MG/DL (ref 0.5–1.4)
EST. GFR  (AFRICAN AMERICAN): >60 ML/MIN/1.73 M^2
EST. GFR  (NON AFRICAN AMERICAN): >60 ML/MIN/1.73 M^2
GLUCOSE SERPL-MCNC: 106 MG/DL (ref 70–110)
HDLC SERPL-MCNC: 49 MG/DL (ref 40–75)
HDLC SERPL: 33.1 % (ref 20–50)
LDLC SERPL CALC-MCNC: 73.2 MG/DL (ref 63–159)
NONHDLC SERPL-MCNC: 99 MG/DL
POTASSIUM SERPL-SCNC: 3.8 MMOL/L (ref 3.5–5.1)
SODIUM SERPL-SCNC: 138 MMOL/L (ref 136–145)
TRIGL SERPL-MCNC: 129 MG/DL (ref 30–150)
TSH SERPL DL<=0.005 MIU/L-ACNC: 2.8 UIU/ML (ref 0.4–4)

## 2021-12-14 PROCEDURE — 80061 LIPID PANEL: CPT | Performed by: INTERNAL MEDICINE

## 2021-12-14 PROCEDURE — 80048 BASIC METABOLIC PNL TOTAL CA: CPT | Performed by: INTERNAL MEDICINE

## 2021-12-14 PROCEDURE — 84443 ASSAY THYROID STIM HORMONE: CPT | Performed by: INTERNAL MEDICINE

## 2021-12-14 PROCEDURE — 36415 COLL VENOUS BLD VENIPUNCTURE: CPT | Mod: PO | Performed by: INTERNAL MEDICINE

## 2021-12-15 ENCOUNTER — PATIENT MESSAGE (OUTPATIENT)
Dept: CARDIOLOGY | Facility: CLINIC | Age: 86
End: 2021-12-15
Payer: MEDICARE

## 2022-02-09 ENCOUNTER — PATIENT OUTREACH (OUTPATIENT)
Dept: ADMINISTRATIVE | Facility: OTHER | Age: 87
End: 2022-02-09
Payer: MEDICARE

## 2022-02-11 ENCOUNTER — PATIENT MESSAGE (OUTPATIENT)
Dept: CARDIOLOGY | Facility: CLINIC | Age: 87
End: 2022-02-11

## 2022-02-11 ENCOUNTER — PATIENT MESSAGE (OUTPATIENT)
Dept: INTERNAL MEDICINE | Facility: CLINIC | Age: 87
End: 2022-02-11
Payer: MEDICARE

## 2022-02-11 ENCOUNTER — HOSPITAL ENCOUNTER (OUTPATIENT)
Dept: RADIOLOGY | Facility: HOSPITAL | Age: 87
Discharge: HOME | End: 2022-02-11
Attending: INTERNAL MEDICINE
Payer: MEDICARE

## 2022-02-11 ENCOUNTER — OFFICE VISIT (OUTPATIENT)
Dept: CARDIOLOGY | Facility: CLINIC | Age: 87
End: 2022-02-11
Payer: MEDICARE

## 2022-02-11 VITALS
SYSTOLIC BLOOD PRESSURE: 139 MMHG | HEART RATE: 83 BPM | WEIGHT: 188.06 LBS | BODY MASS INDEX: 36.92 KG/M2 | HEIGHT: 60 IN | DIASTOLIC BLOOD PRESSURE: 81 MMHG

## 2022-02-11 DIAGNOSIS — Z95.2 S/P TAVR (TRANSCATHETER AORTIC VALVE REPLACEMENT): Chronic | ICD-10-CM

## 2022-02-11 DIAGNOSIS — I50.32 CHRONIC DIASTOLIC HEART FAILURE: Primary | ICD-10-CM

## 2022-02-11 DIAGNOSIS — I35.0 NONRHEUMATIC AORTIC VALVE STENOSIS: Chronic | ICD-10-CM

## 2022-02-11 DIAGNOSIS — I48.20 CHRONIC ATRIAL FIBRILLATION: Chronic | ICD-10-CM

## 2022-02-11 DIAGNOSIS — I70.0 ATHEROSCLEROSIS OF AORTA: Chronic | ICD-10-CM

## 2022-02-11 DIAGNOSIS — I25.10 ARTERIOSCLEROSIS OF CORONARY ARTERY: Chronic | ICD-10-CM

## 2022-02-11 DIAGNOSIS — Z95.1 HISTORY OF CORONARY ARTERY BYPASS GRAFT: Chronic | ICD-10-CM

## 2022-02-11 DIAGNOSIS — I10 ESSENTIAL HYPERTENSION: Chronic | ICD-10-CM

## 2022-02-11 DIAGNOSIS — I65.23 BILATERAL CAROTID ARTERY STENOSIS: Chronic | ICD-10-CM

## 2022-02-11 DIAGNOSIS — I50.32 CHRONIC DIASTOLIC HEART FAILURE: ICD-10-CM

## 2022-02-11 DIAGNOSIS — E61.1 IRON DEFICIENCY: ICD-10-CM

## 2022-02-11 DIAGNOSIS — E78.00 PURE HYPERCHOLESTEROLEMIA: Chronic | ICD-10-CM

## 2022-02-11 PROCEDURE — 99214 OFFICE O/P EST MOD 30 MIN: CPT | Mod: S$PBB,,, | Performed by: INTERNAL MEDICINE

## 2022-02-11 PROCEDURE — 99999 PR PBB SHADOW E&M-EST. PATIENT-LVL IV: ICD-10-PCS | Mod: PBBFAC,,, | Performed by: INTERNAL MEDICINE

## 2022-02-11 PROCEDURE — 71046 X-RAY EXAM CHEST 2 VIEWS: CPT | Mod: 26,,, | Performed by: STUDENT IN AN ORGANIZED HEALTH CARE EDUCATION/TRAINING PROGRAM

## 2022-02-11 PROCEDURE — 71046 X-RAY EXAM CHEST 2 VIEWS: CPT | Mod: TC,PO

## 2022-02-11 PROCEDURE — 99999 PR PBB SHADOW E&M-EST. PATIENT-LVL IV: CPT | Mod: PBBFAC,,, | Performed by: INTERNAL MEDICINE

## 2022-02-11 PROCEDURE — 99214 OFFICE O/P EST MOD 30 MIN: CPT | Mod: PBBFAC,PO | Performed by: INTERNAL MEDICINE

## 2022-02-11 PROCEDURE — 71046 XR CHEST PA AND LATERAL: ICD-10-PCS | Mod: 26,,, | Performed by: STUDENT IN AN ORGANIZED HEALTH CARE EDUCATION/TRAINING PROGRAM

## 2022-02-11 PROCEDURE — 99214 PR OFFICE/OUTPT VISIT, EST, LEVL IV, 30-39 MIN: ICD-10-PCS | Mod: S$PBB,,, | Performed by: INTERNAL MEDICINE

## 2022-02-11 RX ORDER — SPIRONOLACTONE 25 MG/1
12.5 TABLET ORAL 2 TIMES DAILY WITH MEALS
Qty: 90 TABLET | Refills: 3 | Status: SHIPPED | OUTPATIENT
Start: 2022-02-11 | End: 2023-05-22

## 2022-02-11 NOTE — PROGRESS NOTES
Subjective:   02/11/2022     Patient ID:  Damari Grant is a 89 y.o. female who presents for evaulation of Carotid Artery Disease, Shortness of Breath, and s/p TAVR      She is here for evaluation of shortness of breath.  This occurs with activity, she has also been quite fatigued.  There is no PND orthopnea.  Mild lower extremity swelling has been present, but does not appear to be changed.  She has been seen by Pulmonary in the past, but released.  That was Dr. Ahmadi.    In June of 2020 she had a TIA and underwent a left carotid stent placement.  She subsequently underwent a TAVR for severe symptomatic aortic valve stenosis.  Echocardiography in August of 2021 shows left ventricular function to be normal.  The peak pulmonary artery systolic pressure was 40 mm Hg and there was moderate mitral regurgitation.  Left atrium appeared to be moderately dilated consistent with diastolic dysfunction, in atrial fibrillation though.  Laboratory work showed normal CBC and complete metabolic profile.  The calculated aortic valve area was 1.9 centimeter squared, mean aortic valve gradient 6 mm Hg.    Coronary angiography from June of 2020 showed a 50% left main stenosis.  The LAD had an 80% origin stenosis and 90% proximal stenosis.  Circumflex had a 70% stenosis with severe disease in the 1st and 2nd marginal branches.  The right coronary artery had a 70% stenosis immediately proximal to the origin of the streak PDA which gave a small component of flow into the interventricular septum.  Strict PDA has a 70% origin stenosis.  PDA was small and had not been bypassed.  The left intermammary graft to the LAD was patent free of disease.  Saphenous vein graft to the diagonal was free of disease.  Saphenous vein graft to the 1st marginal was patent and free of disease.  Saphenous vein graft was grafted side-to-side to the 2nd marginal branch in end-to-side therapy marginal branch free of disease.  She was felt to have coronary  artery disease medically treated.  She subsequently underwent carotid stenting and TAVR.    She does have chronic atrial fibrillation.  Her heart rate has been well controlled.  She has been anticoagulated with Eliquis.  She has not had bleeding problems.  She has also been on aspirin, that will be discontinued    Hypercholesterolemia has been treated with PCSK9 inhibitor therapy.  She had been intolerant to statin therapy.  Her LDL in November of 2020 was 108.  It had been 85 in 2018. Her most recent LDL was 73, should be less than 70, increase PCSK9 inhibitor to twice a month, she has been only taking it once a month.    She does have a history of iron deficiency, that could play a role in fatigue, will reassess.        Patient Active Problem List   Diagnosis    Neuropathy    Hernia of abdominal wall    BOOP (bronchiolitis obliterans with organizing pneumonia)    Essential hypertension    Other constipation    Carotid artery disease    Nonrheumatic aortic valve stenosis    Chronic fatigue    Atherosclerosis of aorta    History of syncope    Risk for falls    Sensorineural hearing loss (SNHL) of both ears    Chronic atrial fibrillation    Arteriosclerosis of coronary artery    Pulmonary hypertension    History of left common carotid artery stent placement    S/P TAVR (transcatheter aortic valve replacement)    History of coronary artery bypass graft    Anticoagulation management encounter    Orthostatic hypotension    Pure hypercholesterolemia    Statin intolerance    Acquired hypothyroidism    Chronic diastolic heart failure    Iron deficiency        Review of patient's allergies indicates:   Allergen Reactions    Codeine Nausea And Vomiting    Crestor [rosuvastatin] Other (See Comments)     Muscle weakness    Fosamax [alendronate] Other (See Comments)     Didn't tolerate over 10 years ago and doesn't remember what happened.     Livalo [pitavastatin] Other (See Comments)     Muscle  pain    Simvastatin Other (See Comments)     Leg pains/ weakness         Current Outpatient Medications:     acetaminophen (TYLENOL) 500 MG tablet, Take 500 mg by mouth., Disp: , Rfl:     aspirin (ECOTRIN) 81 MG EC tablet, Take 81 mg by mouth once daily., Disp: , Rfl:     ELIQUIS 5 mg Tab, TAKE ONE BY MOUTH TWICE DAILY, Disp: 60 tablet, Rfl: 11    ergocalciferol (ERGOCALCIFEROL) 50,000 unit Cap, Take 1 capsule (50,000 Units total) by mouth every 7 days., Disp: 12 capsule, Rfl: 5    evolocumab (REPATHA SURECLICK) 140 mg/mL PnIj, Inject 1 mL (140 mg total) into the skin every 14 (fourteen) days. (Patient taking differently: Inject 140 mg into the skin every 14 (fourteen) days.), Disp: 6 mL, Rfl: 3    furosemide (LASIX) 20 MG tablet, Take 20 mg by mouth as needed., Disp: , Rfl:     irbesartan-hydrochlorothiazide (AVALIDE) 150-12.5 mg per tablet, TAKE ONE DAILY TO REPLACE AVAPRO AND HCTZ (Patient taking differently: Take 1 tablet by mouth once daily.), Disp: 90 tablet, Rfl: 4    levothyroxine (SYNTHROID) 25 MCG tablet, TAKE ONE DAILY BEFORE BREAKFAST, Disp: 90 tablet, Rfl: 3    metoprolol succinate (TOPROL-XL) 50 MG 24 hr tablet, Take 1 tablet (50 mg total) by mouth once daily. Take 1/2 tablet daily, Disp: 90 tablet, Rfl: 3    spironolactone (ALDACTONE) 25 MG tablet, Take 0.5 tablets (12.5 mg total) by mouth 2 (two) times daily with meals., Disp: 90 tablet, Rfl: 3     Objective:   Review of Systems   Constitutional: Positive for malaise/fatigue.   Cardiovascular: Positive for dyspnea on exertion and leg swelling. Negative for chest pain, claudication, cyanosis, irregular heartbeat, near-syncope, orthopnea, palpitations, paroxysmal nocturnal dyspnea and syncope.       Vitals:    02/11/22 0833   BP: 139/81   Pulse: 83       Physical Exam  Vitals reviewed.   Constitutional:       General: She is not in acute distress.     Appearance: She is well-developed.   HENT:      Head: Normocephalic and atraumatic.       Nose: Nose normal.   Eyes:      Conjunctiva/sclera: Conjunctivae normal.      Pupils: Pupils are equal, round, and reactive to light.   Neck:      Vascular: No hepatojugular reflux or JVD.      Comments: Jugular venous pressure is normal  Cardiovascular:      Rate and Rhythm: Normal rate. Rhythm irregular.      Pulses: Intact distal pulses.      Heart sounds: Normal heart sounds. No murmur heard.  No friction rub. No gallop.    Pulmonary:      Effort: Pulmonary effort is normal. No respiratory distress.      Breath sounds: Wheezing ( slight expiratory wheeze) present. No rales.   Chest:      Chest wall: No tenderness.   Abdominal:      General: Bowel sounds are normal. There is no distension.      Palpations: Abdomen is soft.      Tenderness: There is no abdominal tenderness.   Musculoskeletal:         General: No tenderness or deformity. Normal range of motion.      Cervical back: Normal range of motion and neck supple.      Right lower leg: Edema (1+ right) present.      Left lower leg: Edema (2+ left) present.   Skin:     General: Skin is warm and dry.      Findings: No erythema or rash.   Neurological:      Mental Status: She is alert and oriented to person, place, and time.      Cranial Nerves: No cranial nerve deficit.      Motor: No abnormal muscle tone.      Coordination: Coordination normal.   Psychiatric:         Behavior: Behavior normal.         Thought Content: Thought content normal.         Judgment: Judgment normal.            Component      Latest Ref Rng & Units 12/14/2021 11/9/2021 4/15/2021   WBC      3.90 - 12.70 K/uL  4.40 4.96   RBC      4.00 - 5.40 M/uL  4.60 4.72   Hemoglobin      12.0 - 16.0 g/dL  12.9 13.7   Hematocrit      37.0 - 48.5 %  41.9 44.1   MCV      82 - 98 fL  91 93   MCH      27.0 - 31.0 pg  28.0 29.0   MCHC      32.0 - 36.0 g/dL  30.8 (L) 31.1 (L)   RDW      11.5 - 14.5 %  13.7 14.6 (H)   Platelets      150 - 450 K/uL  211 198   MPV      9.2 - 12.9 fL  12.4 12.8   Immature  Granulocytes      0.0 - 0.5 %  0.2 0.2   Gran # (ANC)      1.8 - 7.7 K/uL  2.1 3.0   Immature Grans (Abs)      0.00 - 0.04 K/uL  0.01 0.01   Lymph #      1.0 - 4.8 K/uL  1.8 1.5   Mono #      0.3 - 1.0 K/uL  0.4 0.4   Eos #      0.0 - 0.5 K/uL  0.0 0.1   Baso #      0.00 - 0.20 K/uL  0.02 0.03   nRBC      0 /100 WBC  0 0   Gran %      38.0 - 73.0 %  48.6 61.1   Lymph %      18.0 - 48.0 %  40.5 29.4   Mono %      4.0 - 15.0 %  9.3 7.5   Eosinophil %      0.0 - 8.0 %  0.9 1.2   Basophil %      0.0 - 1.9 %  0.5 0.6   Differential Method        Automated Automated   Sodium      136 - 145 mmol/L 138  140   Potassium      3.5 - 5.1 mmol/L 3.8  4.1   Chloride      95 - 110 mmol/L 102  105   CO2      23 - 29 mmol/L 24  26   Glucose      70 - 110 mg/dL 106  118 (H)   BUN      8 - 23 mg/dL 11  13   Creatinine      0.5 - 1.4 mg/dL 0.7  0.8   Calcium      8.7 - 10.5 mg/dL 10.1  9.5   PROTEIN TOTAL      6.0 - 8.4 g/dL   7.6   Albumin      3.5 - 5.2 g/dL   3.8   BILIRUBIN TOTAL      0.1 - 1.0 mg/dL   0.9   Alkaline Phosphatase      55 - 135 U/L   68   AST      10 - 40 U/L   16   ALT      10 - 44 U/L   15   Anion Gap      8 - 16 mmol/L 12  9   eGFR if African American      >60 mL/min/1.73 m:2 >60.0  >60.0   eGFR if non African American      >60 mL/min/1.73 m:2 >60.0  >60.0   Cholesterol      120 - 199 mg/dL 148  212 (H)   Triglycerides      30 - 150 mg/dL 129  112   HDL      40 - 75 mg/dL 49  48   LDL Cholesterol External      63.0 - 159.0 mg/dL 73.2  141.6   HDL/Cholesterol Ratio      20.0 - 50.0 % 33.1  22.6   Total Cholesterol/HDL Ratio      2.0 - 5.0 3.0  4.4   Non-HDL Cholesterol      mg/dL 99  164   Iron      30 - 160 ug/dL   46   Transferrin      200 - 375 mg/dL   364   TIBC      250 - 450 ug/dL   539 (H)   Saturated Iron      20 - 50 %   9 (L)   Microalbumin, Urine      ug/mL  5.0    Creatinine, Urine      15.0 - 325.0 mg/dL  51.0    MICROALB/CREAT RATIO      0.0 - 30.0 ug/mg  9.8    Hemoglobin A1C External      4.0 - 5.6  %  5.7 (H)    Estimated Avg Glucose      68 - 131 mg/dL  117    NT-proBNP      <=263 pg/mL   931 (H)   TSH      0.400 - 4.000 uIU/mL 2.802  2.689   Vit D, 25-Hydroxy      30 - 96 ng/mL  24 (L)        Assessment and Plan:     Chronic diastolic heart failure  Comments:  Will add spironolactone to optimize diuretic regimen  Check iron level, lower levels worsened heart failure  Orders:  -     Basic Metabolic Panel; Future; Expected date: 02/18/2022  -     X-Ray Chest PA And Lateral; Future; Expected date: 02/11/2022  -     spironolactone (ALDACTONE) 25 MG tablet; Take 0.5 tablets (12.5 mg total) by mouth 2 (two) times daily with meals.  Dispense: 90 tablet; Refill: 3    Arteriosclerosis of coronary artery  Comments:  Recent cardiac catheterization showed no evidence for ischemia    History of coronary artery bypass graft    S/P TAVR (transcatheter aortic valve replacement)    Pure hypercholesterolemia  Comments:  Increase PCSK9 inhibitor to every 2 weeks instead of monthly, had decreased due to cost    Chronic atrial fibrillation  Comments:  Rate controlled  Anticoagulated with Eliquis    Essential hypertension  Comments:  Spironolactone should help  Orders:  -     spironolactone (ALDACTONE) 25 MG tablet; Take 0.5 tablets (12.5 mg total) by mouth 2 (two) times daily with meals.  Dispense: 90 tablet; Refill: 3    Bilateral carotid artery stenosis    Atherosclerosis of aorta  Comments:  Maximize blood pressure and lipid control    Nonrheumatic aortic valve stenosis    Iron deficiency  -     CBC Auto Differential; Future; Expected date: 02/18/2022  -     Iron and TIBC; Future; Expected date: 02/11/2022         Follow up in about 3 months (around 5/11/2022).

## 2022-02-11 NOTE — PATIENT INSTRUCTIONS
Recommendations:  1. Discontinue aspirin  2. Continue Eliquis 5 mg twice a day  3. Begin spironolactone 25 mg tablets, 1/2 tablet twice a day to help shortness of breath and blood pressure  4. Check lab today including iron level  5. Increase your cholesterol shot to every 2 weeks

## 2022-03-18 ENCOUNTER — HOSPITAL ENCOUNTER (OUTPATIENT)
Dept: RADIOLOGY | Facility: HOSPITAL | Age: 87
Discharge: HOME | End: 2022-03-18
Attending: INTERNAL MEDICINE
Payer: MEDICARE

## 2022-03-18 ENCOUNTER — OFFICE VISIT (OUTPATIENT)
Dept: PULMONOLOGY | Facility: CLINIC | Age: 87
End: 2022-03-18
Payer: MEDICARE

## 2022-03-18 VITALS
SYSTOLIC BLOOD PRESSURE: 126 MMHG | BODY MASS INDEX: 32.03 KG/M2 | HEART RATE: 82 BPM | HEIGHT: 63 IN | WEIGHT: 180.75 LBS | DIASTOLIC BLOOD PRESSURE: 72 MMHG | OXYGEN SATURATION: 96 %

## 2022-03-18 DIAGNOSIS — J84.89 BOOP (BRONCHIOLITIS OBLITERANS WITH ORGANIZING PNEUMONIA): Primary | ICD-10-CM

## 2022-03-18 DIAGNOSIS — J84.89 BOOP (BRONCHIOLITIS OBLITERANS WITH ORGANIZING PNEUMONIA): ICD-10-CM

## 2022-03-18 DIAGNOSIS — R93.89 NEW ABNORMALITY ON CHEST X-RAY: Primary | ICD-10-CM

## 2022-03-18 PROCEDURE — 71046 X-RAY EXAM CHEST 2 VIEWS: CPT | Mod: TC,FY

## 2022-03-18 PROCEDURE — 99204 PR OFFICE/OUTPT VISIT, NEW, LEVL IV, 45-59 MIN: ICD-10-PCS | Mod: S$PBB,,, | Performed by: INTERNAL MEDICINE

## 2022-03-18 PROCEDURE — 99204 OFFICE O/P NEW MOD 45 MIN: CPT | Mod: S$PBB,,, | Performed by: INTERNAL MEDICINE

## 2022-03-18 PROCEDURE — 99999 PR PBB SHADOW E&M-EST. PATIENT-LVL III: ICD-10-PCS | Mod: PBBFAC,,, | Performed by: INTERNAL MEDICINE

## 2022-03-18 PROCEDURE — 99999 PR PBB SHADOW E&M-EST. PATIENT-LVL III: CPT | Mod: PBBFAC,,, | Performed by: INTERNAL MEDICINE

## 2022-03-18 PROCEDURE — 71046 XR CHEST PA AND LATERAL: ICD-10-PCS | Mod: 26,,, | Performed by: STUDENT IN AN ORGANIZED HEALTH CARE EDUCATION/TRAINING PROGRAM

## 2022-03-18 PROCEDURE — 71046 X-RAY EXAM CHEST 2 VIEWS: CPT | Mod: 26,,, | Performed by: STUDENT IN AN ORGANIZED HEALTH CARE EDUCATION/TRAINING PROGRAM

## 2022-03-18 PROCEDURE — 99213 OFFICE O/P EST LOW 20 MIN: CPT | Mod: PBBFAC,25 | Performed by: INTERNAL MEDICINE

## 2022-03-18 NOTE — PROGRESS NOTES
Subjective:      Patient ID: Damari Grant is a 89 y.o. female.    Chief Complaint: Shortness of Breath    HPI  89 yo matriacrch of the [facility] is referred by Dr. Langley for vague changes on the recent chest x-ray. The patient has chronic compensated atrial fibrillation and was seen by Dr. Langley for dyspnea and mild lower extremity edema.on 2/11 and started her on spirolactone.  The patient has seen Dr. Ahmadi in the past. Had APD of 40 with evidence of diastolic dysfunction on her echo. Had CABG and recently aortic valve replaced TAVR    Today is alert and orient, accompanied by her daughter. Her repeat chest x-ray is better and she had some mild doreen hilar infiltrates.on the film of Feb 11  She was given spirolactone.  She weighed  188 pounds at that visit and today her film is clear with sternal wires hilar infiltrates have resolved  and she weighs 180 pounds      I think that the good Dr. Langley cured the patient, but I took credit for it when I showed her the clear x-ray.  Review of Systems   Constitutional: Negative.    HENT: Negative.    Eyes: Negative.    Respiratory: Negative.         Abnormal chest x-ray cleared with the loss of 8 pounds.after starting spirolactone.   Cardiovascular: Negative.         S?P CABG and Valve   Genitourinary: Negative.    Musculoskeletal: Negative.    Skin: Negative.    Gastrointestinal: Negative.    Neurological: Negative.    Psychiatric/Behavioral: Negative.      Objective:     Physical Exam  Vitals and nursing note reviewed.   Constitutional:       General: She is not in acute distress.     Appearance: She is well-developed.   HENT:      Head: Normocephalic and atraumatic.      Right Ear: External ear normal.      Left Ear: External ear normal.      Nose: Nose normal.   Eyes:      Conjunctiva/sclera: Conjunctivae normal.      Pupils: Pupils are equal, round, and reactive to light.   Neck:      Thyroid: No thyromegaly.      Vascular: No JVD.    Cardiovascular:      Rate and Rhythm: Normal rate and regular rhythm.      Heart sounds: Normal heart sounds. No murmur heard.    No gallop.   Pulmonary:      Effort: No respiratory distress.      Breath sounds: Normal breath sounds. No stridor. No wheezing or rales.      Comments: Clear without  Wheezes or rales.    Sa02; 96%  Chest:      Chest wall: No tenderness.   Abdominal:      General: Bowel sounds are normal. There is no distension.      Palpations: Abdomen is soft. There is no mass.      Tenderness: There is no abdominal tenderness. There is no guarding or rebound.   Musculoskeletal:         General: Normal range of motion.      Cervical back: Normal range of motion and neck supple.   Lymphadenopathy:      Cervical: No cervical adenopathy.   Skin:     General: Skin is warm and dry.      Findings: No rash.   Neurological:      Mental Status: She is alert and oriented to person, place, and time.      Cranial Nerves: No cranial nerve deficit.      Deep Tendon Reflexes: Reflexes are normal and symmetric. Reflexes normal.   Psychiatric:         Behavior: Behavior normal.         Thought Content: Thought content normal.         Judgment: Judgment normal.         Assessment:     1. New abnormality on chest x-ray      Outpatient Encounter Medications as of 3/18/2022   Medication Sig Dispense Refill    acetaminophen (TYLENOL) 500 MG tablet Take 500 mg by mouth.      aspirin (ECOTRIN) 81 MG EC tablet Take 81 mg by mouth once daily.      ELIQUIS 5 mg Tab TAKE ONE BY MOUTH TWICE DAILY 60 tablet 11    ergocalciferol (ERGOCALCIFEROL) 50,000 unit Cap Take 1 capsule (50,000 Units total) by mouth every 7 days. 12 capsule 5    evolocumab (REPATHA SURECLICK) 140 mg/mL PnIj Inject 1 mL (140 mg total) into the skin every 14 (fourteen) days. (Patient taking differently: Inject 140 mg into the skin every 14 (fourteen) days.) 6 mL 3    furosemide (LASIX) 20 MG tablet Take 20 mg by mouth as needed.       irbesartan-hydrochlorothiazide (AVALIDE) 150-12.5 mg per tablet TAKE ONE DAILY TO REPLACE AVAPRO AND HCTZ (Patient taking differently: Take 1 tablet by mouth once daily.) 90 tablet 4    levothyroxine (SYNTHROID) 25 MCG tablet TAKE ONE DAILY BEFORE BREAKFAST 90 tablet 3    metoprolol succinate (TOPROL-XL) 50 MG 24 hr tablet TAKE (1/2) HALF BY MOUTH DAILY 45 tablet 3    spironolactone (ALDACTONE) 25 MG tablet Take 0.5 tablets (12.5 mg total) by mouth 2 (two) times daily with meals. 90 tablet 3     No facility-administered encounter medications on file as of 3/18/2022.     No orders of the defined types were placed in this encounter.    Plan:     Problem List Items Addressed This Visit    None     Visit Diagnoses     New abnormality on chest x-ray    -  Primary        Resolved pulmonary edema after initiating treatment with spirolactone.and losing 8 pounds.

## 2022-03-28 ENCOUNTER — PATIENT MESSAGE (OUTPATIENT)
Dept: INTERNAL MEDICINE | Facility: CLINIC | Age: 87
End: 2022-03-28
Payer: MEDICARE

## 2022-03-28 DIAGNOSIS — L60.0 INGROWN TOENAIL: Primary | ICD-10-CM

## 2022-03-29 ENCOUNTER — HOSPITAL ENCOUNTER (OUTPATIENT)
Dept: CARDIOLOGY | Facility: HOSPITAL | Age: 87
Discharge: HOME | End: 2022-03-29
Attending: INTERNAL MEDICINE
Payer: MEDICARE

## 2022-03-29 ENCOUNTER — OFFICE VISIT (OUTPATIENT)
Dept: INTERNAL MEDICINE | Facility: CLINIC | Age: 87
End: 2022-03-29
Payer: MEDICARE

## 2022-03-29 VITALS
WEIGHT: 180 LBS | DIASTOLIC BLOOD PRESSURE: 60 MMHG | BODY MASS INDEX: 31.89 KG/M2 | HEART RATE: 90 BPM | TEMPERATURE: 97 F | RESPIRATION RATE: 14 BRPM | SYSTOLIC BLOOD PRESSURE: 112 MMHG | HEIGHT: 63 IN

## 2022-03-29 DIAGNOSIS — I25.119 ATHEROSCLEROSIS OF NATIVE CORONARY ARTERY OF NATIVE HEART WITH ANGINA PECTORIS: ICD-10-CM

## 2022-03-29 DIAGNOSIS — R22.41 LOCALIZED SWELLING OF RIGHT LOWER EXTREMITY: ICD-10-CM

## 2022-03-29 DIAGNOSIS — R22.41 LOCALIZED SWELLING OF RIGHT LOWER EXTREMITY: Primary | ICD-10-CM

## 2022-03-29 DIAGNOSIS — J84.89 BOOP (BRONCHIOLITIS OBLITERANS WITH ORGANIZING PNEUMONIA): ICD-10-CM

## 2022-03-29 DIAGNOSIS — M79.604 RIGHT LEG PAIN: ICD-10-CM

## 2022-03-29 PROCEDURE — 99999 PR PBB SHADOW E&M-EST. PATIENT-LVL III: ICD-10-PCS | Mod: PBBFAC,,, | Performed by: INTERNAL MEDICINE

## 2022-03-29 PROCEDURE — 99999 PR PBB SHADOW E&M-EST. PATIENT-LVL III: CPT | Mod: PBBFAC,,, | Performed by: INTERNAL MEDICINE

## 2022-03-29 PROCEDURE — 93971 EXTREMITY STUDY: CPT | Mod: RT

## 2022-03-29 PROCEDURE — 93971 CV US DOPPLER VENOUS LEG RIGHT (CUPID ONLY): ICD-10-PCS | Mod: 26,RT,, | Performed by: INTERNAL MEDICINE

## 2022-03-29 PROCEDURE — 99213 OFFICE O/P EST LOW 20 MIN: CPT | Mod: S$PBB,,, | Performed by: INTERNAL MEDICINE

## 2022-03-29 PROCEDURE — 93971 EXTREMITY STUDY: CPT | Mod: 26,RT,, | Performed by: INTERNAL MEDICINE

## 2022-03-29 PROCEDURE — 99213 PR OFFICE/OUTPT VISIT, EST, LEVL III, 20-29 MIN: ICD-10-PCS | Mod: S$PBB,,, | Performed by: INTERNAL MEDICINE

## 2022-03-29 PROCEDURE — 99213 OFFICE O/P EST LOW 20 MIN: CPT | Mod: PBBFAC,PO | Performed by: INTERNAL MEDICINE

## 2022-03-29 NOTE — PROGRESS NOTES
Subjective:       Patient ID: Damari Grant is a 89 y.o. female.    Chief Complaint: Leg Pain    HPI     89-year-old female with BOOP, atherosclerosis of aorta here for evaluation right leg pain.  Her mom complained of her right leg hurting her yesterday am, but has had pain in the past.  She had more pain when her daughter got home from work yesterday and had trouble weight bearing and going up steps.  The pain starts on the outside of her leg and goes to her ankle.  She has trouble putting weight on it today.  She is relatively inacitve.      Review of Systems      Objective:      Physical Exam  Vitals reviewed.   Constitutional:       Appearance: She is well-developed.   HENT:      Head: Normocephalic and atraumatic.      Mouth/Throat:      Pharynx: No oropharyngeal exudate.   Eyes:      General: No scleral icterus.        Right eye: No discharge.         Left eye: No discharge.      Pupils: Pupils are equal, round, and reactive to light.   Neck:      Thyroid: No thyromegaly.      Trachea: No tracheal deviation.   Cardiovascular:      Rate and Rhythm: Normal rate and regular rhythm.      Heart sounds: Normal heart sounds. No murmur heard.    No friction rub. No gallop.   Pulmonary:      Effort: Pulmonary effort is normal. No respiratory distress.      Breath sounds: Rhonchi present. No wheezing or rales.   Chest:      Chest wall: No tenderness.   Abdominal:      General: Bowel sounds are normal. There is no distension.      Palpations: Abdomen is soft. There is no mass.      Tenderness: There is no abdominal tenderness. There is no guarding or rebound.   Musculoskeletal:         General: Swelling (2+ bilateral LE) present. No tenderness. Normal range of motion.      Cervical back: Normal range of motion and neck supple.      Comments: Calf tender to palpation on right   Skin:     General: Skin is warm and dry.      Coloration: Skin is not pale.      Findings: No erythema or rash.   Neurological:      Mental  Status: She is alert and oriented to person, place, and time.   Psychiatric:         Behavior: Behavior normal.         Assessment:       1. Localized swelling of right lower extremity  - CV Ultrasound doppler venous DVT leg right; Future  - D dimer, quantitative; Future    2. Right leg pain  - CBC Auto Differential; Future  - Comprehensive Metabolic Panel; Future  - Magnesium; Future    3. BOOP (bronchiolitis obliterans with organizing pneumonia)    4. Atherosclerosis of native coronary artery of native heart with angina pectoris      Plan:       1.  Check ultrasound, D-dimer.  2. Check CBC, CMP, magnesium.  3. Followed by Pulmonary.  4. Monitor.      Debating whether clot or  pulled muscle.  If ultrasounds are negative for clot, would refer to home health physical therapy.

## 2022-03-30 ENCOUNTER — TELEPHONE (OUTPATIENT)
Dept: INTERNAL MEDICINE | Facility: CLINIC | Age: 87
End: 2022-03-30
Payer: MEDICARE

## 2022-03-30 ENCOUNTER — PATIENT MESSAGE (OUTPATIENT)
Dept: INTERNAL MEDICINE | Facility: CLINIC | Age: 87
End: 2022-03-30
Payer: MEDICARE

## 2022-03-30 DIAGNOSIS — E83.52 HYPERCALCEMIA: Primary | ICD-10-CM

## 2022-03-30 RX ORDER — DICLOFENAC SODIUM 10 MG/G
2 GEL TOPICAL 3 TIMES DAILY
Qty: 200 G | Refills: 1 | Status: SHIPPED | OUTPATIENT
Start: 2022-03-30 | End: 2023-07-10

## 2022-03-30 NOTE — TELEPHONE ENCOUNTER
Your electrolytes, kidney/liver function, magnesium, blood counts are normal.  D-dimer is negative.  Your calcium is a little elevated.  I am going to recheck along with other labs.

## 2022-04-06 ENCOUNTER — PATIENT MESSAGE (OUTPATIENT)
Dept: INTERNAL MEDICINE | Facility: CLINIC | Age: 87
End: 2022-04-06
Payer: MEDICARE

## 2022-04-07 ENCOUNTER — OFFICE VISIT (OUTPATIENT)
Dept: INTERNAL MEDICINE | Facility: CLINIC | Age: 87
End: 2022-04-07
Payer: MEDICARE

## 2022-04-07 VITALS
BODY MASS INDEX: 30.6 KG/M2 | WEIGHT: 179.25 LBS | TEMPERATURE: 98 F | RESPIRATION RATE: 16 BRPM | HEART RATE: 88 BPM | DIASTOLIC BLOOD PRESSURE: 52 MMHG | OXYGEN SATURATION: 99 % | SYSTOLIC BLOOD PRESSURE: 102 MMHG | HEIGHT: 64 IN

## 2022-04-07 DIAGNOSIS — R35.0 INCREASED URINARY FREQUENCY: Primary | ICD-10-CM

## 2022-04-07 LAB
BILIRUB UR QL STRIP: NEGATIVE
CLARITY UR REFRACT.AUTO: ABNORMAL
COLOR UR AUTO: YELLOW
GLUCOSE UR QL STRIP: NEGATIVE
HGB UR QL STRIP: NEGATIVE
KETONES UR QL STRIP: NEGATIVE
LEUKOCYTE ESTERASE UR QL STRIP: NEGATIVE
NITRITE UR QL STRIP: NEGATIVE
PH UR STRIP: 5 [PH] (ref 5–8)
PROT UR QL STRIP: NEGATIVE
SP GR UR STRIP: 1.02 (ref 1–1.03)
URN SPEC COLLECT METH UR: ABNORMAL

## 2022-04-07 PROCEDURE — 99214 OFFICE O/P EST MOD 30 MIN: CPT | Mod: PBBFAC,PO | Performed by: STUDENT IN AN ORGANIZED HEALTH CARE EDUCATION/TRAINING PROGRAM

## 2022-04-07 PROCEDURE — 81003 URINALYSIS AUTO W/O SCOPE: CPT | Performed by: STUDENT IN AN ORGANIZED HEALTH CARE EDUCATION/TRAINING PROGRAM

## 2022-04-07 PROCEDURE — 99999 PR PBB SHADOW E&M-EST. PATIENT-LVL IV: ICD-10-PCS | Mod: PBBFAC,,, | Performed by: STUDENT IN AN ORGANIZED HEALTH CARE EDUCATION/TRAINING PROGRAM

## 2022-04-07 PROCEDURE — 87086 URINE CULTURE/COLONY COUNT: CPT | Performed by: STUDENT IN AN ORGANIZED HEALTH CARE EDUCATION/TRAINING PROGRAM

## 2022-04-07 PROCEDURE — 99214 OFFICE O/P EST MOD 30 MIN: CPT | Mod: S$PBB,,, | Performed by: STUDENT IN AN ORGANIZED HEALTH CARE EDUCATION/TRAINING PROGRAM

## 2022-04-07 PROCEDURE — 99214 PR OFFICE/OUTPT VISIT, EST, LEVL IV, 30-39 MIN: ICD-10-PCS | Mod: S$PBB,,, | Performed by: STUDENT IN AN ORGANIZED HEALTH CARE EDUCATION/TRAINING PROGRAM

## 2022-04-07 PROCEDURE — 99999 PR PBB SHADOW E&M-EST. PATIENT-LVL IV: CPT | Mod: PBBFAC,,, | Performed by: STUDENT IN AN ORGANIZED HEALTH CARE EDUCATION/TRAINING PROGRAM

## 2022-04-07 RX ORDER — ESTRADIOL 0.1 MG/G
CREAM VAGINAL
Qty: 30 G | Refills: 11 | Status: SHIPPED | OUTPATIENT
Start: 2022-04-07 | End: 2023-07-10

## 2022-04-07 NOTE — PROGRESS NOTES
Subjective:      Chief Complaint: Urinary Frequency (Nocturia)    HPI   Ms. Grant is a very nice 89 yo F with SNHL, BOOP,  TIA, aortic atherosclerosis s/p TAVR with 2/2 LBBB), Afib, CAD s/p CABG, HTN, HLD, severe carotid artery stenosis (L-70/80%; R- 30/40%) s/p stenting of L-internal carotid-A, aortic stenosis, hypothyroidism presenting for of increased urinary frequency (particularly at night); denies current dysuria.  Of note, chart checking reveals 5 consecutive urinalysis via my predecessor without bacterial growth.     Review of Systems   Constitutional: Negative for appetite change, chills and fever.   HENT: Negative.    Respiratory: Negative for cough, chest tightness and shortness of breath.    Cardiovascular: Negative for chest pain, palpitations and leg swelling.   Gastrointestinal: Negative for abdominal distention, abdominal pain, blood in stool, constipation, diarrhea, nausea and vomiting.   Endocrine: Negative.    Genitourinary: Positive for frequency. Negative for difficulty urinating, dysuria and hematuria.   Musculoskeletal: Negative.    Integumentary:  Negative.   Neurological: Negative.    Psychiatric/Behavioral: Negative.          Objective:      Vitals:    04/07/22 1522   BP: (!) 102/52   Pulse: 88   Resp: 16   Temp: 97.5 °F (36.4 °C)      Physical Exam  Vitals reviewed.   Constitutional:       General: She is not in acute distress.     Appearance: Normal appearance.   HENT:      Head: Normocephalic and atraumatic.      Comments: Facial features are symmetric      Nose: Nose normal. No congestion or rhinorrhea.      Mouth/Throat:      Mouth: Mucous membranes are moist.      Pharynx: Oropharynx is clear. No oropharyngeal exudate or posterior oropharyngeal erythema.   Eyes:      General: No scleral icterus.     Extraocular Movements: Extraocular movements intact.      Conjunctiva/sclera: Conjunctivae normal.   Cardiovascular:      Rate and Rhythm: Normal rate and regular rhythm.      Pulses:  Normal pulses.      Heart sounds: Normal heart sounds.   Pulmonary:      Effort: Pulmonary effort is normal. No respiratory distress.      Breath sounds: Normal breath sounds.   Musculoskeletal:         General: No deformity or signs of injury. Normal range of motion.      Cervical back: Normal range of motion.      Comments: Gait normal    Skin:     General: Skin is warm and dry.      Findings: No rash.   Neurological:      General: No focal deficit present.      Mental Status: She is alert and oriented to person, place, and time. Mental status is at baseline.   Psychiatric:         Mood and Affect: Mood normal.         Behavior: Behavior normal.         Thought Content: Thought content normal.       Current Outpatient Medications on File Prior to Visit   Medication Sig Dispense Refill    acetaminophen (TYLENOL) 500 MG tablet Take 500 mg by mouth.      aspirin (ECOTRIN) 81 MG EC tablet Take 81 mg by mouth once daily.      diclofenac sodium (VOLTAREN) 1 % Gel Apply 2 g topically 3 (three) times daily. 200 g 1    ELIQUIS 5 mg Tab TAKE ONE BY MOUTH TWICE DAILY 60 tablet 11    ergocalciferol (ERGOCALCIFEROL) 50,000 unit Cap Take 1 capsule (50,000 Units total) by mouth every 7 days. 12 capsule 5    evolocumab (REPATHA SURECLICK) 140 mg/mL PnIj Inject 1 mL (140 mg total) into the skin every 14 (fourteen) days. (Patient taking differently: Inject 140 mg into the skin every 14 (fourteen) days.) 6 mL 3    furosemide (LASIX) 20 MG tablet Take 20 mg by mouth as needed.      irbesartan-hydrochlorothiazide (AVALIDE) 150-12.5 mg per tablet TAKE ONE DAILY TO REPLACE AVAPRO AND HCTZ (Patient taking differently: Take 1 tablet by mouth once daily.) 90 tablet 4    levothyroxine (SYNTHROID) 25 MCG tablet TAKE ONE DAILY BEFORE BREAKFAST 90 tablet 3    metoprolol succinate (TOPROL-XL) 50 MG 24 hr tablet TAKE (1/2) HALF BY MOUTH DAILY 45 tablet 3    spironolactone (ALDACTONE) 25 MG tablet Take 0.5 tablets (12.5 mg total) by  mouth 2 (two) times daily with meals. 90 tablet 3     No current facility-administered medications on file prior to visit.         Assessment:       1. Increased urinary frequency        Plan:       Increased urinary frequency  -     Urinalysis  -     Urine culture   - differential includes primary polydipsia (encouraged to limit fluid intake within 4 hours of bedtime), urinary tract infection (rule out urinalysis/culture ordered), and to a lesser degree urinary retention (will trial topical estrogen to treat any primary atrophic vaginitis prior to consideration of urology referral).    Other orders  -     estradioL (ESTRACE) 0.01 % (0.1 mg/gram) vaginal cream; 1 g once daily for 1 week then as needed thereafter  Dispense: 30 g; Refill: 11

## 2022-04-08 LAB
BACTERIA UR CULT: NORMAL
BACTERIA UR CULT: NORMAL

## 2022-04-11 ENCOUNTER — PATIENT MESSAGE (OUTPATIENT)
Dept: CARDIOLOGY | Facility: CLINIC | Age: 87
End: 2022-04-11
Payer: MEDICARE

## 2022-04-18 ENCOUNTER — PATIENT MESSAGE (OUTPATIENT)
Dept: CARDIOLOGY | Facility: CLINIC | Age: 87
End: 2022-04-18
Payer: MEDICARE

## 2022-04-27 ENCOUNTER — PATIENT MESSAGE (OUTPATIENT)
Dept: CARDIOLOGY | Facility: CLINIC | Age: 87
End: 2022-04-27
Payer: MEDICARE

## 2022-05-03 ENCOUNTER — PATIENT MESSAGE (OUTPATIENT)
Dept: CARDIOLOGY | Facility: CLINIC | Age: 87
End: 2022-05-03
Payer: MEDICARE

## 2022-05-10 ENCOUNTER — PATIENT OUTREACH (OUTPATIENT)
Dept: ADMINISTRATIVE | Facility: OTHER | Age: 87
End: 2022-05-10
Payer: MEDICARE

## 2022-05-11 ENCOUNTER — OFFICE VISIT (OUTPATIENT)
Dept: CARDIOLOGY | Facility: CLINIC | Age: 87
End: 2022-05-11
Payer: MEDICARE

## 2022-05-11 VITALS
WEIGHT: 181.88 LBS | SYSTOLIC BLOOD PRESSURE: 88 MMHG | DIASTOLIC BLOOD PRESSURE: 58 MMHG | BODY MASS INDEX: 31.05 KG/M2 | HEIGHT: 64 IN | HEART RATE: 79 BPM

## 2022-05-11 DIAGNOSIS — Z95.828 HISTORY OF LEFT COMMON CAROTID ARTERY STENT PLACEMENT: ICD-10-CM

## 2022-05-11 DIAGNOSIS — Z95.2 S/P TAVR (TRANSCATHETER AORTIC VALVE REPLACEMENT): Chronic | ICD-10-CM

## 2022-05-11 DIAGNOSIS — I95.1 ORTHOSTATIC HYPOTENSION: Chronic | ICD-10-CM

## 2022-05-11 DIAGNOSIS — Z51.81 ANTICOAGULATION MANAGEMENT ENCOUNTER: ICD-10-CM

## 2022-05-11 DIAGNOSIS — I50.32 CHRONIC DIASTOLIC HEART FAILURE: Primary | Chronic | ICD-10-CM

## 2022-05-11 DIAGNOSIS — I35.0 NONRHEUMATIC AORTIC VALVE STENOSIS: Chronic | ICD-10-CM

## 2022-05-11 DIAGNOSIS — Z78.9 STATIN INTOLERANCE: ICD-10-CM

## 2022-05-11 DIAGNOSIS — E78.00 PURE HYPERCHOLESTEROLEMIA: Chronic | ICD-10-CM

## 2022-05-11 DIAGNOSIS — I65.23 BILATERAL CAROTID ARTERY STENOSIS: Chronic | ICD-10-CM

## 2022-05-11 DIAGNOSIS — I25.10 ARTERIOSCLEROSIS OF CORONARY ARTERY: Chronic | ICD-10-CM

## 2022-05-11 DIAGNOSIS — Z79.01 ANTICOAGULATION MANAGEMENT ENCOUNTER: ICD-10-CM

## 2022-05-11 DIAGNOSIS — I70.0 ATHEROSCLEROSIS OF AORTA: Chronic | ICD-10-CM

## 2022-05-11 DIAGNOSIS — I48.20 CHRONIC ATRIAL FIBRILLATION: Chronic | ICD-10-CM

## 2022-05-11 DIAGNOSIS — Z98.890 HISTORY OF LEFT COMMON CAROTID ARTERY STENT PLACEMENT: ICD-10-CM

## 2022-05-11 DIAGNOSIS — Z95.1 HISTORY OF CORONARY ARTERY BYPASS GRAFT: Chronic | ICD-10-CM

## 2022-05-11 DIAGNOSIS — I10 ESSENTIAL HYPERTENSION: Chronic | ICD-10-CM

## 2022-05-11 PROCEDURE — 99999 PR PBB SHADOW E&M-EST. PATIENT-LVL IV: ICD-10-PCS | Mod: PBBFAC,,, | Performed by: INTERNAL MEDICINE

## 2022-05-11 PROCEDURE — 99999 PR PBB SHADOW E&M-EST. PATIENT-LVL IV: CPT | Mod: PBBFAC,,, | Performed by: INTERNAL MEDICINE

## 2022-05-11 PROCEDURE — 99214 OFFICE O/P EST MOD 30 MIN: CPT | Mod: PBBFAC,PO | Performed by: INTERNAL MEDICINE

## 2022-05-11 PROCEDURE — 99214 PR OFFICE/OUTPT VISIT, EST, LEVL IV, 30-39 MIN: ICD-10-PCS | Mod: S$PBB,,, | Performed by: INTERNAL MEDICINE

## 2022-05-11 PROCEDURE — 99214 OFFICE O/P EST MOD 30 MIN: CPT | Mod: S$PBB,,, | Performed by: INTERNAL MEDICINE

## 2022-05-11 RX ORDER — ASCORBIC ACID 125 MG
1 TABLET,CHEWABLE ORAL DAILY
COMMUNITY
End: 2023-07-10

## 2022-05-11 NOTE — PROGRESS NOTES
Subjective:   05/11/2022     Patient ID:  Damari Grant is a 89 y.o. female who presents for evaulation of Congestive Heart Failure, Carotid Artery Disease, and Atrial Fibrillation      I had seen her 2 months ago for evaluation of shortness of breath.  Spironolactone was added to her medications.  She has lost weight since then and, fluid.  She was seen in Pulmonary by Dr. Cardenas, her chest x-ray had cleared which she felt was probably due to treatment of her diastolic heart failure.  Her shortness breath has improved.  She is not having chest pains or tightness.  She has not had edema.    In June of 2020 she had a TIA and underwent a left carotid stent placement.  She subsequently underwent a TAVR for severe symptomatic aortic valve stenosis.  Echocardiography in August of 2021 shows left ventricular function to be normal.  The peak pulmonary artery systolic pressure was 40 mm Hg and there was moderate mitral regurgitation.  Left atrium appeared to be moderately dilated consistent with diastolic dysfunction, in atrial fibrillation though.  Laboratory work showed normal CBC and complete metabolic profile.  The calculated aortic valve area was 1.9 centimeter squared, mean aortic valve gradient 6 mm Hg.    Coronary angiography from June of 2020 showed a 50% left main stenosis.  The LAD had an 80% origin stenosis and 90% proximal stenosis.  Circumflex had a 70% stenosis with severe disease in the 1st and 2nd marginal branches.  The right coronary artery had a 70% stenosis immediately proximal to the origin of the streak PDA which gave a small component of flow into the interventricular septum.  Strict PDA has a 70% origin stenosis.  PDA was small and had not been bypassed.  The left intermammary graft to the LAD was patent free of disease.  Saphenous vein graft to the diagonal was free of disease.  Saphenous vein graft to the 1st marginal was patent and free of disease.  Saphenous vein graft was grafted side-to-side  to the 2nd marginal branch in end-to-side therapy marginal branch free of disease.  She was felt to have coronary artery disease medically treated.  She subsequently underwent carotid stenting and TAVR.    She does have chronic atrial fibrillation.  Her heart rate has been well controlled.  She has been anticoagulated with Eliquis.  She has not had bleeding problems.  She was also on aspirin, that has been discontinued    Hypercholesterolemia has been treated with PCSK9 inhibitor therapy.  She had been intolerant to statin therapy.  Currently on PCSK9 inhibitor every 2 weeks, last LDL 73.    She does have a history of iron deficiency, the last iron level was normal.        Patient Active Problem List   Diagnosis    Neuropathy    Hernia of abdominal wall    BOOP (bronchiolitis obliterans with organizing pneumonia)    Essential hypertension    Other constipation    Carotid artery disease    Nonrheumatic aortic valve stenosis    Chronic fatigue    Atherosclerosis of aorta    History of syncope    Risk for falls    Sensorineural hearing loss (SNHL) of both ears    Chronic atrial fibrillation    Arteriosclerosis of coronary artery    Pulmonary hypertension    History of left common carotid artery stent placement    S/P TAVR (transcatheter aortic valve replacement)    History of coronary artery bypass graft    Anticoagulation management encounter    Orthostatic hypotension    Pure hypercholesterolemia    Statin intolerance    Acquired hypothyroidism    Chronic diastolic heart failure    Iron deficiency        Review of patient's allergies indicates:   Allergen Reactions    Codeine Nausea And Vomiting    Crestor [rosuvastatin] Other (See Comments)     Muscle weakness    Fosamax [alendronate] Other (See Comments)     Didn't tolerate over 10 years ago and doesn't remember what happened.     Livalo [pitavastatin] Other (See Comments)     Muscle pain    Simvastatin Other (See Comments)     Leg  pains/ weakness         Current Outpatient Medications:     acetaminophen (TYLENOL) 500 MG tablet, Take 500 mg by mouth as needed., Disp: , Rfl:     diclofenac sodium (VOLTAREN) 1 % Gel, Apply 2 g topically 3 (three) times daily., Disp: 200 g, Rfl: 1    ELIQUIS 5 mg Tab, TAKE ONE BY MOUTH TWICE DAILY, Disp: 60 tablet, Rfl: 11    ergocalciferol (ERGOCALCIFEROL) 50,000 unit Cap, Take 1 capsule (50,000 Units total) by mouth every 7 days., Disp: 12 capsule, Rfl: 5    estradioL (ESTRACE) 0.01 % (0.1 mg/gram) vaginal cream, 1 g once daily for 1 week then as needed thereafter, Disp: 30 g, Rfl: 11    evolocumab (REPATHA SURECLICK) 140 mg/mL PnIj, Inject 1 mL (140 mg total) into the skin every 14 (fourteen) days. (Patient taking differently: Inject 140 mg into the skin every 14 (fourteen) days.), Disp: 6 mL, Rfl: 3    furosemide (LASIX) 20 MG tablet, Take 20 mg by mouth as needed., Disp: , Rfl:     levothyroxine (SYNTHROID) 25 MCG tablet, TAKE ONE DAILY BEFORE BREAKFAST, Disp: 90 tablet, Rfl: 3    metoprolol succinate (TOPROL-XL) 50 MG 24 hr tablet, TAKE (1/2) HALF BY MOUTH DAILY, Disp: 45 tablet, Rfl: 3    spironolactone (ALDACTONE) 25 MG tablet, Take 0.5 tablets (12.5 mg total) by mouth 2 (two) times daily with meals. (Patient taking differently: Take 12.5 mg by mouth once daily.), Disp: 90 tablet, Rfl: 3    vitamin K2 100 mcg Cap, Take by mouth Daily., Disp: , Rfl:      Objective:   Review of Systems   Constitutional: Positive for malaise/fatigue.   Cardiovascular: Positive for dyspnea on exertion. Negative for chest pain, claudication, cyanosis, irregular heartbeat, leg swelling, near-syncope, orthopnea, palpitations, paroxysmal nocturnal dyspnea and syncope.       Vitals:    05/11/22 0918   BP: (!) 88/58   Pulse: 79       Physical Exam  Vitals reviewed.   Constitutional:       General: She is not in acute distress.     Appearance: She is well-developed.   HENT:      Head: Normocephalic and atraumatic.       Nose: Nose normal.   Eyes:      Conjunctiva/sclera: Conjunctivae normal.      Pupils: Pupils are equal, round, and reactive to light.   Neck:      Vascular: No hepatojugular reflux or JVD.      Comments: Jugular venous pressure is normal  Cardiovascular:      Rate and Rhythm: Normal rate. Rhythm irregular.      Pulses: Intact distal pulses.      Heart sounds: Murmur (Grade 1 systolic ejection murmur) heard.     No friction rub. No gallop.   Pulmonary:      Effort: Pulmonary effort is normal. No respiratory distress.      Breath sounds: No wheezing or rales.   Chest:      Chest wall: No tenderness.   Abdominal:      General: Bowel sounds are normal. There is no distension.      Palpations: Abdomen is soft.      Tenderness: There is no abdominal tenderness.   Musculoskeletal:         General: No tenderness or deformity. Normal range of motion.      Cervical back: Normal range of motion and neck supple.      Right lower leg: Edema (Trace right) present.      Left lower leg: Edema (Trace left) present.   Skin:     General: Skin is warm and dry.      Findings: No erythema or rash.   Neurological:      Mental Status: She is alert and oriented to person, place, and time.      Cranial Nerves: No cranial nerve deficit.      Motor: No abnormal muscle tone.      Coordination: Coordination normal.   Psychiatric:         Behavior: Behavior normal.         Thought Content: Thought content normal.         Judgment: Judgment normal.            Component      Latest Ref Rng & Units 12/14/2021 11/9/2021 4/15/2021   WBC      3.90 - 12.70 K/uL  4.40 4.96   RBC      4.00 - 5.40 M/uL  4.60 4.72   Hemoglobin      12.0 - 16.0 g/dL  12.9 13.7   Hematocrit      37.0 - 48.5 %  41.9 44.1   MCV      82 - 98 fL  91 93   MCH      27.0 - 31.0 pg  28.0 29.0   MCHC      32.0 - 36.0 g/dL  30.8 (L) 31.1 (L)   RDW      11.5 - 14.5 %  13.7 14.6 (H)   Platelets      150 - 450 K/uL  211 198   MPV      9.2 - 12.9 fL  12.4 12.8   Immature Granulocytes       0.0 - 0.5 %  0.2 0.2   Gran # (ANC)      1.8 - 7.7 K/uL  2.1 3.0   Immature Grans (Abs)      0.00 - 0.04 K/uL  0.01 0.01   Lymph #      1.0 - 4.8 K/uL  1.8 1.5   Mono #      0.3 - 1.0 K/uL  0.4 0.4   Eos #      0.0 - 0.5 K/uL  0.0 0.1   Baso #      0.00 - 0.20 K/uL  0.02 0.03   nRBC      0 /100 WBC  0 0   Gran %      38.0 - 73.0 %  48.6 61.1   Lymph %      18.0 - 48.0 %  40.5 29.4   Mono %      4.0 - 15.0 %  9.3 7.5   Eosinophil %      0.0 - 8.0 %  0.9 1.2   Basophil %      0.0 - 1.9 %  0.5 0.6   Differential Method        Automated Automated   Sodium      136 - 145 mmol/L 138  140   Potassium      3.5 - 5.1 mmol/L 3.8  4.1   Chloride      95 - 110 mmol/L 102  105   CO2      23 - 29 mmol/L 24  26   Glucose      70 - 110 mg/dL 106  118 (H)   BUN      8 - 23 mg/dL 11  13   Creatinine      0.5 - 1.4 mg/dL 0.7  0.8   Calcium      8.7 - 10.5 mg/dL 10.1  9.5   PROTEIN TOTAL      6.0 - 8.4 g/dL   7.6   Albumin      3.5 - 5.2 g/dL   3.8   BILIRUBIN TOTAL      0.1 - 1.0 mg/dL   0.9   Alkaline Phosphatase      55 - 135 U/L   68   AST      10 - 40 U/L   16   ALT      10 - 44 U/L   15   Anion Gap      8 - 16 mmol/L 12  9   eGFR if African American      >60 mL/min/1.73 m:2 >60.0  >60.0   eGFR if non African American      >60 mL/min/1.73 m:2 >60.0  >60.0   Cholesterol      120 - 199 mg/dL 148  212 (H)   Triglycerides      30 - 150 mg/dL 129  112   HDL      40 - 75 mg/dL 49  48   LDL Cholesterol External      63.0 - 159.0 mg/dL 73.2  141.6   HDL/Cholesterol Ratio      20.0 - 50.0 % 33.1  22.6   Total Cholesterol/HDL Ratio      2.0 - 5.0 3.0  4.4   Non-HDL Cholesterol      mg/dL 99  164   Iron      30 - 160 ug/dL   46   Transferrin      200 - 375 mg/dL   364   TIBC      250 - 450 ug/dL   539 (H)   Saturated Iron      20 - 50 %   9 (L)   Microalbumin, Urine      ug/mL  5.0    Creatinine, Urine      15.0 - 325.0 mg/dL  51.0    MICROALB/CREAT RATIO      0.0 - 30.0 ug/mg  9.8    Hemoglobin A1C External      4.0 - 5.6 %  5.7 (H)     Estimated Avg Glucose      68 - 131 mg/dL  117    NT-proBNP      <=263 pg/mL   931 (H)   TSH      0.400 - 4.000 uIU/mL 2.802  2.689   Vit D, 25-Hydroxy      30 - 96 ng/mL  24 (L)      Lab Results   Component Value Date     03/29/2022    K 4.5 03/29/2022     03/29/2022    CO2 27 03/29/2022    BUN 23 03/29/2022    CREATININE 0.8 03/29/2022     03/29/2022    HGBA1C 5.7 (H) 11/09/2021    MG 1.9 03/29/2022    AST 17 03/29/2022    ALT 13 03/29/2022    ALBUMIN 3.9 03/29/2022    PROT 7.4 03/29/2022    BILITOT 0.6 03/29/2022    WBC 4.61 03/29/2022    HGB 12.5 03/29/2022    HCT 40.0 03/29/2022    MCV 86 03/29/2022     03/29/2022    TSH 2.802 12/14/2021    CHOL 148 12/14/2021    HDL 49 12/14/2021    LDLCALC 73.2 12/14/2021    TRIG 129 12/14/2021     Assessment and Plan:     Chronic diastolic heart failure  Comments:  Seems improved on therapy with spironolactone  Orders:  -     Basic Metabolic Panel; Future; Expected date: 05/25/2022  -     NT-Pro Natriuretic Peptide; Future; Expected date: 05/25/2022    Arteriosclerosis of coronary artery  Comments:  Asymptomatic    Chronic atrial fibrillation  Comments:  Rate controlled    Essential hypertension  Comments:  Blood pressure now low, discontinue irbesartan/HCT, taking 1/2 dose now    History of coronary artery bypass graft    History of left common carotid artery stent placement  Comments:  Asymptomatic    Orthostatic hypotension    Pure hypercholesterolemia    S/P TAVR (transcatheter aortic valve replacement)    Nonrheumatic aortic valve stenosis    Bilateral carotid artery stenosis    Atherosclerosis of aorta    Anticoagulation management encounter  Comments:  No bleeding, no on efficiency    Statin intolerance  Comments:  On PCSK9 inhibitor therapy         Follow up in about 4 months (around 9/11/2022).

## 2022-05-13 ENCOUNTER — PATIENT MESSAGE (OUTPATIENT)
Dept: CARDIOLOGY | Facility: CLINIC | Age: 87
End: 2022-05-13
Payer: MEDICARE

## 2022-05-24 ENCOUNTER — PATIENT MESSAGE (OUTPATIENT)
Dept: CARDIOLOGY | Facility: CLINIC | Age: 87
End: 2022-05-24
Payer: MEDICARE

## 2022-05-24 ENCOUNTER — LAB VISIT (OUTPATIENT)
Dept: LAB | Facility: HOSPITAL | Age: 87
End: 2022-05-24
Attending: INTERNAL MEDICINE
Payer: MEDICARE

## 2022-05-24 DIAGNOSIS — I50.32 CHRONIC DIASTOLIC HEART FAILURE: Chronic | ICD-10-CM

## 2022-05-24 LAB
ANION GAP SERPL CALC-SCNC: 7 MMOL/L (ref 8–16)
BUN SERPL-MCNC: 12 MG/DL (ref 8–23)
CALCIUM SERPL-MCNC: 9.8 MG/DL (ref 8.7–10.5)
CHLORIDE SERPL-SCNC: 104 MMOL/L (ref 95–110)
CO2 SERPL-SCNC: 26 MMOL/L (ref 23–29)
CREAT SERPL-MCNC: 0.8 MG/DL (ref 0.5–1.4)
EST. GFR  (AFRICAN AMERICAN): >60 ML/MIN/1.73 M^2
EST. GFR  (NON AFRICAN AMERICAN): >60 ML/MIN/1.73 M^2
GLUCOSE SERPL-MCNC: 110 MG/DL (ref 70–110)
POTASSIUM SERPL-SCNC: 4.6 MMOL/L (ref 3.5–5.1)
SODIUM SERPL-SCNC: 137 MMOL/L (ref 136–145)

## 2022-05-24 PROCEDURE — 83880 ASSAY OF NATRIURETIC PEPTIDE: CPT | Performed by: INTERNAL MEDICINE

## 2022-05-24 PROCEDURE — 80048 BASIC METABOLIC PNL TOTAL CA: CPT | Performed by: INTERNAL MEDICINE

## 2022-05-25 ENCOUNTER — PATIENT MESSAGE (OUTPATIENT)
Dept: INTERNAL MEDICINE | Facility: CLINIC | Age: 87
End: 2022-05-25
Payer: MEDICARE

## 2022-05-26 ENCOUNTER — PATIENT MESSAGE (OUTPATIENT)
Dept: INTERNAL MEDICINE | Facility: CLINIC | Age: 87
End: 2022-05-26
Payer: MEDICARE

## 2022-05-26 LAB — NT-PROBNP SERPL-MCNC: 886 PG/ML

## 2022-05-27 ENCOUNTER — PATIENT MESSAGE (OUTPATIENT)
Dept: INTERNAL MEDICINE | Facility: CLINIC | Age: 87
End: 2022-05-27
Payer: MEDICARE

## 2022-06-27 RX ORDER — LEVOTHYROXINE SODIUM 25 UG/1
TABLET ORAL
Qty: 90 TABLET | Refills: 1 | Status: SHIPPED | OUTPATIENT
Start: 2022-06-27 | End: 2022-09-23

## 2022-06-27 NOTE — TELEPHONE ENCOUNTER
Refill Decision Note   Damari Grant  is requesting a refill authorization.  Brief Assessment and Rationale for Refill:  Approve     Medication Therapy Plan:  Previous order matches. TSH WNL 12/14/2021    Medication Reconciliation Completed: No   Comments:     No Care Gaps recommended.     Note composed:4:28 PM 06/27/2022

## 2022-06-27 NOTE — TELEPHONE ENCOUNTER
No new care gaps identified.  [facility] Embedded Care Gaps. Reference number: [id number]. 6/27/2022   4:17:24 PM CDT

## 2022-06-28 ENCOUNTER — OFFICE VISIT (OUTPATIENT)
Dept: CARDIOLOGY | Facility: CLINIC | Age: 87
End: 2022-06-28
Payer: MEDICARE

## 2022-06-28 VITALS
DIASTOLIC BLOOD PRESSURE: 79 MMHG | BODY MASS INDEX: 30.45 KG/M2 | HEART RATE: 84 BPM | HEIGHT: 64 IN | WEIGHT: 178.38 LBS | SYSTOLIC BLOOD PRESSURE: 134 MMHG

## 2022-06-28 DIAGNOSIS — Z95.2 S/P TAVR (TRANSCATHETER AORTIC VALVE REPLACEMENT): Chronic | ICD-10-CM

## 2022-06-28 DIAGNOSIS — I10 ESSENTIAL HYPERTENSION: Chronic | ICD-10-CM

## 2022-06-28 DIAGNOSIS — I25.10 ARTERIOSCLEROSIS OF CORONARY ARTERY: Chronic | ICD-10-CM

## 2022-06-28 DIAGNOSIS — I35.0 NONRHEUMATIC AORTIC VALVE STENOSIS: Chronic | ICD-10-CM

## 2022-06-28 DIAGNOSIS — Z95.828 HISTORY OF LEFT COMMON CAROTID ARTERY STENT PLACEMENT: ICD-10-CM

## 2022-06-28 DIAGNOSIS — I48.20 CHRONIC ATRIAL FIBRILLATION: Chronic | ICD-10-CM

## 2022-06-28 DIAGNOSIS — R00.2 PALPITATIONS: ICD-10-CM

## 2022-06-28 DIAGNOSIS — I50.32 CHRONIC DIASTOLIC HEART FAILURE: Primary | Chronic | ICD-10-CM

## 2022-06-28 DIAGNOSIS — Z51.81 ANTICOAGULATION MANAGEMENT ENCOUNTER: ICD-10-CM

## 2022-06-28 DIAGNOSIS — Z95.1 HISTORY OF CORONARY ARTERY BYPASS GRAFT: Chronic | ICD-10-CM

## 2022-06-28 DIAGNOSIS — Z79.01 ANTICOAGULATION MANAGEMENT ENCOUNTER: ICD-10-CM

## 2022-06-28 DIAGNOSIS — E78.00 PURE HYPERCHOLESTEROLEMIA: Chronic | ICD-10-CM

## 2022-06-28 DIAGNOSIS — I65.23 BILATERAL CAROTID ARTERY STENOSIS: Chronic | ICD-10-CM

## 2022-06-28 DIAGNOSIS — Z98.890 HISTORY OF LEFT COMMON CAROTID ARTERY STENT PLACEMENT: ICD-10-CM

## 2022-06-28 PROCEDURE — 99213 OFFICE O/P EST LOW 20 MIN: CPT | Mod: PBBFAC,PO | Performed by: INTERNAL MEDICINE

## 2022-06-28 PROCEDURE — 99999 PR PBB SHADOW E&M-EST. PATIENT-LVL III: ICD-10-PCS | Mod: PBBFAC,,, | Performed by: INTERNAL MEDICINE

## 2022-06-28 PROCEDURE — 99214 PR OFFICE/OUTPT VISIT, EST, LEVL IV, 30-39 MIN: ICD-10-PCS | Mod: S$PBB,25,, | Performed by: INTERNAL MEDICINE

## 2022-06-28 PROCEDURE — 93005 ELECTROCARDIOGRAM TRACING: CPT | Mod: PBBFAC,PO | Performed by: INTERNAL MEDICINE

## 2022-06-28 PROCEDURE — 99999 PR PBB SHADOW E&M-EST. PATIENT-LVL III: CPT | Mod: PBBFAC,,, | Performed by: INTERNAL MEDICINE

## 2022-06-28 PROCEDURE — 99214 OFFICE O/P EST MOD 30 MIN: CPT | Mod: S$PBB,25,, | Performed by: INTERNAL MEDICINE

## 2022-06-28 PROCEDURE — 93010 ELECTROCARDIOGRAM REPORT: CPT | Mod: S$PBB,,, | Performed by: INTERNAL MEDICINE

## 2022-06-28 PROCEDURE — 93010 EKG 12-LEAD: ICD-10-PCS | Mod: S$PBB,,, | Performed by: INTERNAL MEDICINE

## 2022-06-28 NOTE — PROGRESS NOTES
Subjective:   06/28/2022     Patient ID:  Damari Grant is a 89 y.o. female who presents for evaulation of Shortness of Breath and Atrial Fibrillation      She comes in now with recurrent shortness of breath.  She has not noted increasing swelling.  Her shortness of breath occurred primarily waking her from sleep 2 nights ago, has since improved.  She has not had chest pains or tightness.  She does have a history of diastolic heart failure.  She is not on furosemide, but continues to take spironolactone.       I had seen her 2 months ago for evaluation of shortness of breath.  Spironolactone was added to her medications.  She has lost weight since then and, fluid.  She was seen in Pulmonary by Dr. Cardenas, her chest x-ray had cleared which she felt was probably due to treatment of her diastolic heart failure.  Her shortness breath has improved.  She is not having chest pains or tightness.  She has not had edema.    In June of 2020 she had a TIA and underwent a left carotid stent placement.  She subsequently underwent a TAVR for severe symptomatic aortic valve stenosis.  Echocardiography in August of 2021 shows left ventricular function to be normal.  The peak pulmonary artery systolic pressure was 40 mm Hg and there was moderate mitral regurgitation.  Left atrium appeared to be moderately dilated consistent with diastolic dysfunction, in atrial fibrillation though.  Laboratory work showed normal CBC and complete metabolic profile.  The calculated aortic valve area was 1.9 centimeter squared, mean aortic valve gradient 6 mm Hg.    Coronary angiography from June of 2020 showed a 50% left main stenosis.  The LAD had an 80% origin stenosis and 90% proximal stenosis.  Circumflex had a 70% stenosis with severe disease in the 1st and 2nd marginal branches.  The right coronary artery had a 70% stenosis immediately proximal to the origin of the streak PDA which gave a small component of flow into the interventricular  septum.  Strict PDA has a 70% origin stenosis.  PDA was small and had not been bypassed.  The left intermammary graft to the LAD was patent free of disease.  Saphenous vein graft to the diagonal was free of disease.  Saphenous vein graft to the 1st marginal was patent and free of disease.  Saphenous vein graft was grafted side-to-side to the 2nd marginal branch in end-to-side therapy marginal branch free of disease.  She was felt to have coronary artery disease medically treated.  She subsequently underwent carotid stenting and TAVR.    She does have chronic atrial fibrillation.  Her heart rate has been well controlled.  She has been anticoagulated with Eliquis.  She has not had bleeding problems.  She was also on aspirin, that has been discontinued    Hypercholesterolemia has been treated with PCSK9 inhibitor therapy.  She had been intolerant to statin therapy.  Currently on PCSK9 inhibitor every 2 weeks, last LDL 73.    She does have a history of iron deficiency, the last iron level was normal.      Shortness of Breath  Pertinent negatives include no chest pain, claudication, leg swelling, orthopnea, PND or syncope.   Atrial Fibrillation  Symptoms include shortness of breath. Symptoms are negative for chest pain, palpitations and syncope. Past medical history includes atrial fibrillation.       Patient Active Problem List   Diagnosis    Neuropathy    Hernia of abdominal wall    BOOP (bronchiolitis obliterans with organizing pneumonia)    Essential hypertension    Other constipation    Carotid artery disease    Nonrheumatic aortic valve stenosis    Chronic fatigue    Atherosclerosis of aorta    History of syncope    Risk for falls    Sensorineural hearing loss (SNHL) of both ears    Chronic atrial fibrillation    Arteriosclerosis of coronary artery    Pulmonary hypertension    History of left common carotid artery stent placement    S/P TAVR (transcatheter aortic valve replacement)    History of  coronary artery bypass graft    Anticoagulation management encounter    Orthostatic hypotension    Pure hypercholesterolemia    Statin intolerance    Acquired hypothyroidism    Chronic diastolic heart failure    Iron deficiency        Review of patient's allergies indicates:   Allergen Reactions    Codeine Nausea And Vomiting    Crestor [rosuvastatin] Other (See Comments)     Muscle weakness    Fosamax [alendronate] Other (See Comments)     Didn't tolerate over 10 years ago and doesn't remember what happened.     Livalo [pitavastatin] Other (See Comments)     Muscle pain    Simvastatin Other (See Comments)     Leg pains/ weakness         Current Outpatient Medications:     acetaminophen (TYLENOL) 500 MG tablet, Take 500 mg by mouth as needed., Disp: , Rfl:     diclofenac sodium (VOLTAREN) 1 % Gel, Apply 2 g topically 3 (three) times daily., Disp: 200 g, Rfl: 1    ELIQUIS 5 mg Tab, TAKE ONE BY MOUTH TWICE DAILY, Disp: 60 tablet, Rfl: 11    ergocalciferol (ERGOCALCIFEROL) 50,000 unit Cap, Take 1 capsule (50,000 Units total) by mouth every 7 days., Disp: 12 capsule, Rfl: 5    estradioL (ESTRACE) 0.01 % (0.1 mg/gram) vaginal cream, 1 g once daily for 1 week then as needed thereafter, Disp: 30 g, Rfl: 11    evolocumab (REPATHA SURECLICK) 140 mg/mL PnIj, Inject 1 mL (140 mg total) into the skin every 14 (fourteen) days. (Patient taking differently: Inject 140 mg into the skin every 14 (fourteen) days.), Disp: 6 mL, Rfl: 3    furosemide (LASIX) 20 MG tablet, Take 20 mg by mouth as needed., Disp: , Rfl:     levothyroxine (SYNTHROID) 25 MCG tablet, TAKE ONE DAILY BEFORE BREAKFAST, Disp: 90 tablet, Rfl: 1    metoprolol succinate (TOPROL-XL) 50 MG 24 hr tablet, TAKE (1/2) HALF BY MOUTH DAILY, Disp: 45 tablet, Rfl: 3    spironolactone (ALDACTONE) 25 MG tablet, Take 0.5 tablets (12.5 mg total) by mouth 2 (two) times daily with meals. (Patient taking differently: Take 12.5 mg by mouth once daily.), Disp: 90  tablet, Rfl: 3    vitamin K2 100 mcg Cap, Take by mouth Daily., Disp: , Rfl:     empagliflozin (JARDIANCE) 10 mg tablet, Take 1 tablet (10 mg total) by mouth once daily., Disp: 30 tablet, Rfl: 11     Objective:   Review of Systems   Constitutional: Positive for malaise/fatigue.   Cardiovascular: Positive for dyspnea on exertion. Negative for chest pain, claudication, cyanosis, irregular heartbeat, leg swelling, near-syncope, orthopnea, palpitations, paroxysmal nocturnal dyspnea and syncope.   Respiratory: Positive for shortness of breath.        Vitals:    06/28/22 1504   BP: 134/79   Pulse: 84       Physical Exam  Vitals reviewed.   Constitutional:       General: She is not in acute distress.     Appearance: She is well-developed.   HENT:      Head: Normocephalic and atraumatic.      Nose: Nose normal.   Eyes:      Conjunctiva/sclera: Conjunctivae normal.      Pupils: Pupils are equal, round, and reactive to light.   Neck:      Vascular: No hepatojugular reflux or JVD.      Comments: Jugular venous pressure is normal  Cardiovascular:      Rate and Rhythm: Normal rate. Rhythm irregular.      Pulses: Intact distal pulses.      Heart sounds: Murmur (Grade 1 systolic ejection murmur) heard.     No friction rub. No gallop.   Pulmonary:      Effort: Pulmonary effort is normal. No respiratory distress.      Breath sounds: No wheezing or rales.   Chest:      Chest wall: No tenderness.   Abdominal:      General: Bowel sounds are normal. There is no distension.      Palpations: Abdomen is soft.      Tenderness: There is no abdominal tenderness.   Musculoskeletal:         General: No tenderness or deformity. Normal range of motion.      Cervical back: Normal range of motion and neck supple.      Right lower leg: Edema (Trace right) present.      Left lower leg: Edema (2+) present.   Skin:     General: Skin is warm and dry.      Findings: No erythema or rash.   Neurological:      Mental Status: She is alert and oriented to  person, place, and time.      Cranial Nerves: No cranial nerve deficit.      Motor: No abnormal muscle tone.      Coordination: Coordination normal.   Psychiatric:         Behavior: Behavior normal.         Thought Content: Thought content normal.         Judgment: Judgment normal.            Component      Latest Ref Rng & Units 12/14/2021 11/9/2021 4/15/2021   WBC      3.90 - 12.70 K/uL  4.40 4.96   RBC      4.00 - 5.40 M/uL  4.60 4.72   Hemoglobin      12.0 - 16.0 g/dL  12.9 13.7   Hematocrit      37.0 - 48.5 %  41.9 44.1   MCV      82 - 98 fL  91 93   MCH      27.0 - 31.0 pg  28.0 29.0   MCHC      32.0 - 36.0 g/dL  30.8 (L) 31.1 (L)   RDW      11.5 - 14.5 %  13.7 14.6 (H)   Platelets      150 - 450 K/uL  211 198   MPV      9.2 - 12.9 fL  12.4 12.8   Immature Granulocytes      0.0 - 0.5 %  0.2 0.2   Gran # (ANC)      1.8 - 7.7 K/uL  2.1 3.0   Immature Grans (Abs)      0.00 - 0.04 K/uL  0.01 0.01   Lymph #      1.0 - 4.8 K/uL  1.8 1.5   Mono #      0.3 - 1.0 K/uL  0.4 0.4   Eos #      0.0 - 0.5 K/uL  0.0 0.1   Baso #      0.00 - 0.20 K/uL  0.02 0.03   nRBC      0 /100 WBC  0 0   Gran %      38.0 - 73.0 %  48.6 61.1   Lymph %      18.0 - 48.0 %  40.5 29.4   Mono %      4.0 - 15.0 %  9.3 7.5   Eosinophil %      0.0 - 8.0 %  0.9 1.2   Basophil %      0.0 - 1.9 %  0.5 0.6   Differential Method        Automated Automated   Sodium      136 - 145 mmol/L 138  140   Potassium      3.5 - 5.1 mmol/L 3.8  4.1   Chloride      95 - 110 mmol/L 102  105   CO2      23 - 29 mmol/L 24  26   Glucose      70 - 110 mg/dL 106  118 (H)   BUN      8 - 23 mg/dL 11  13   Creatinine      0.5 - 1.4 mg/dL 0.7  0.8   Calcium      8.7 - 10.5 mg/dL 10.1  9.5   PROTEIN TOTAL      6.0 - 8.4 g/dL   7.6   Albumin      3.5 - 5.2 g/dL   3.8   BILIRUBIN TOTAL      0.1 - 1.0 mg/dL   0.9   Alkaline Phosphatase      55 - 135 U/L   68   AST      10 - 40 U/L   16   ALT      10 - 44 U/L   15   Anion Gap      8 - 16 mmol/L 12  9   eGFR if African American       >60 mL/min/1.73 m:2 >60.0  >60.0   eGFR if non African American      >60 mL/min/1.73 m:2 >60.0  >60.0   Cholesterol      120 - 199 mg/dL 148  212 (H)   Triglycerides      30 - 150 mg/dL 129  112   HDL      40 - 75 mg/dL 49  48   LDL Cholesterol External      63.0 - 159.0 mg/dL 73.2  141.6   HDL/Cholesterol Ratio      20.0 - 50.0 % 33.1  22.6   Total Cholesterol/HDL Ratio      2.0 - 5.0 3.0  4.4   Non-HDL Cholesterol      mg/dL 99  164   Iron      30 - 160 ug/dL   46   Transferrin      200 - 375 mg/dL   364   TIBC      250 - 450 ug/dL   539 (H)   Saturated Iron      20 - 50 %   9 (L)   Microalbumin, Urine      ug/mL  5.0    Creatinine, Urine      15.0 - 325.0 mg/dL  51.0    MICROALB/CREAT RATIO      0.0 - 30.0 ug/mg  9.8    Hemoglobin A1C External      4.0 - 5.6 %  5.7 (H)    Estimated Avg Glucose      68 - 131 mg/dL  117    NT-proBNP      <=263 pg/mL   931 (H)   TSH      0.400 - 4.000 uIU/mL 2.802  2.689   Vit D, 25-Hydroxy      30 - 96 ng/mL  24 (L)      Lab Results   Component Value Date     05/24/2022    K 4.6 05/24/2022     05/24/2022    CO2 26 05/24/2022    BUN 12 05/24/2022    CREATININE 0.8 05/24/2022     05/24/2022    HGBA1C 5.7 (H) 11/09/2021    MG 1.9 03/29/2022    AST 17 03/29/2022    ALT 13 03/29/2022    ALBUMIN 3.9 03/29/2022    PROT 7.4 03/29/2022    BILITOT 0.6 03/29/2022    WBC 4.61 03/29/2022    HGB 12.5 03/29/2022    HCT 40.0 03/29/2022    MCV 86 03/29/2022     03/29/2022    TSH 2.802 12/14/2021    CHOL 148 12/14/2021    HDL 49 12/14/2021    LDLCALC 73.2 12/14/2021    TRIG 129 12/14/2021     Assessment and Plan:     Chronic diastolic heart failure  -     Basic Metabolic Panel; Standing  -     CBC Auto Differential; Future; Expected date: 06/30/2022  -     NT-Pro Natriuretic Peptide; Future; Expected date: 06/30/2022  -     Echo; Future; Expected date: 06/29/2022  -     empagliflozin (JARDIANCE) 10 mg tablet; Take 1 tablet (10 mg total) by mouth once daily.  Dispense: 30  tablet; Refill: 11    Palpitations  -     IN OFFICE EKG 12-LEAD (to [facility])    Chronic atrial fibrillation    Nonrheumatic aortic valve stenosis    Bilateral carotid artery stenosis    Essential hypertension    S/P TAVR (transcatheter aortic valve replacement)    Arteriosclerosis of coronary artery    History of coronary artery bypass graft    Pure hypercholesterolemia    History of left common carotid artery stent placement    Anticoagulation management encounter       Will treat as diastolic heart failure, add Jardiance 10 mg tablets, 1/2 per day, increased to 1 per day as tolerated.  Follow up in about 3 weeks (around 7/19/2022).

## 2022-06-29 ENCOUNTER — LAB VISIT (OUTPATIENT)
Dept: LAB | Facility: HOSPITAL | Age: 87
End: 2022-06-29
Attending: INTERNAL MEDICINE
Payer: MEDICARE

## 2022-06-29 DIAGNOSIS — I50.32 CHRONIC DIASTOLIC HEART FAILURE: Chronic | ICD-10-CM

## 2022-06-29 LAB
ANION GAP SERPL CALC-SCNC: 9 MMOL/L (ref 8–16)
BASOPHILS # BLD AUTO: 0.03 K/UL (ref 0–0.2)
BASOPHILS NFR BLD: 0.6 % (ref 0–1.9)
BUN SERPL-MCNC: 12 MG/DL (ref 8–23)
CALCIUM SERPL-MCNC: 10 MG/DL (ref 8.7–10.5)
CHLORIDE SERPL-SCNC: 103 MMOL/L (ref 95–110)
CO2 SERPL-SCNC: 27 MMOL/L (ref 23–29)
CREAT SERPL-MCNC: 0.8 MG/DL (ref 0.5–1.4)
DIFFERENTIAL METHOD: ABNORMAL
EOSINOPHIL # BLD AUTO: 0 K/UL (ref 0–0.5)
EOSINOPHIL NFR BLD: 0.9 % (ref 0–8)
ERYTHROCYTE [DISTWIDTH] IN BLOOD BY AUTOMATED COUNT: 14.9 % (ref 11.5–14.5)
EST. GFR  (AFRICAN AMERICAN): >60 ML/MIN/1.73 M^2
EST. GFR  (NON AFRICAN AMERICAN): >60 ML/MIN/1.73 M^2
GLUCOSE SERPL-MCNC: 98 MG/DL (ref 70–110)
HCT VFR BLD AUTO: 42.9 % (ref 37–48.5)
HGB BLD-MCNC: 13 G/DL (ref 12–16)
IMM GRANULOCYTES # BLD AUTO: 0.02 K/UL (ref 0–0.04)
IMM GRANULOCYTES NFR BLD AUTO: 0.4 % (ref 0–0.5)
LYMPHOCYTES # BLD AUTO: 1.2 K/UL (ref 1–4.8)
LYMPHOCYTES NFR BLD: 25.1 % (ref 18–48)
MCH RBC QN AUTO: 29.2 PG (ref 27–31)
MCHC RBC AUTO-ENTMCNC: 30.3 G/DL (ref 32–36)
MCV RBC AUTO: 96 FL (ref 82–98)
MONOCYTES # BLD AUTO: 0.5 K/UL (ref 0.3–1)
MONOCYTES NFR BLD: 10.1 % (ref 4–15)
NEUTROPHILS # BLD AUTO: 2.9 K/UL (ref 1.8–7.7)
NEUTROPHILS NFR BLD: 62.9 % (ref 38–73)
NRBC BLD-RTO: 0 /100 WBC
PLATELET # BLD AUTO: 204 K/UL (ref 150–450)
PMV BLD AUTO: 12.6 FL (ref 9.2–12.9)
POTASSIUM SERPL-SCNC: 4.4 MMOL/L (ref 3.5–5.1)
RBC # BLD AUTO: 4.45 M/UL (ref 4–5.4)
SODIUM SERPL-SCNC: 139 MMOL/L (ref 136–145)
WBC # BLD AUTO: 4.66 K/UL (ref 3.9–12.7)

## 2022-06-29 PROCEDURE — 36415 COLL VENOUS BLD VENIPUNCTURE: CPT | Mod: PO | Performed by: INTERNAL MEDICINE

## 2022-06-29 PROCEDURE — 80048 BASIC METABOLIC PNL TOTAL CA: CPT | Performed by: INTERNAL MEDICINE

## 2022-06-29 PROCEDURE — 83880 ASSAY OF NATRIURETIC PEPTIDE: CPT | Performed by: INTERNAL MEDICINE

## 2022-06-29 PROCEDURE — 85025 COMPLETE CBC W/AUTO DIFF WBC: CPT | Performed by: INTERNAL MEDICINE

## 2022-06-30 LAB — NT-PROBNP SERPL IA-MCNC: 1030 PG/ML

## 2022-07-04 ENCOUNTER — PATIENT MESSAGE (OUTPATIENT)
Dept: CARDIOLOGY | Facility: CLINIC | Age: 87
End: 2022-07-04
Payer: MEDICARE

## 2022-07-14 ENCOUNTER — LAB VISIT (OUTPATIENT)
Dept: LAB | Facility: HOSPITAL | Age: 87
End: 2022-07-14
Attending: INTERNAL MEDICINE
Payer: MEDICARE

## 2022-07-14 DIAGNOSIS — I50.32 CHRONIC DIASTOLIC HEART FAILURE: Chronic | ICD-10-CM

## 2022-07-14 LAB
ANION GAP SERPL CALC-SCNC: 7 MMOL/L (ref 8–16)
BUN SERPL-MCNC: 16 MG/DL (ref 8–23)
CALCIUM SERPL-MCNC: 10.2 MG/DL (ref 8.7–10.5)
CHLORIDE SERPL-SCNC: 105 MMOL/L (ref 95–110)
CO2 SERPL-SCNC: 27 MMOL/L (ref 23–29)
CREAT SERPL-MCNC: 0.8 MG/DL (ref 0.5–1.4)
EST. GFR  (AFRICAN AMERICAN): >60 ML/MIN/1.73 M^2
EST. GFR  (NON AFRICAN AMERICAN): >60 ML/MIN/1.73 M^2
GLUCOSE SERPL-MCNC: 104 MG/DL (ref 70–110)
POTASSIUM SERPL-SCNC: 4.4 MMOL/L (ref 3.5–5.1)
SODIUM SERPL-SCNC: 139 MMOL/L (ref 136–145)

## 2022-07-14 PROCEDURE — 80048 BASIC METABOLIC PNL TOTAL CA: CPT | Performed by: INTERNAL MEDICINE

## 2022-07-14 PROCEDURE — 36415 COLL VENOUS BLD VENIPUNCTURE: CPT | Mod: PO | Performed by: INTERNAL MEDICINE

## 2022-07-19 ENCOUNTER — HOSPITAL ENCOUNTER (OUTPATIENT)
Dept: CARDIOLOGY | Facility: HOSPITAL | Age: 87
Discharge: HOME | End: 2022-07-19
Attending: INTERNAL MEDICINE
Payer: MEDICARE

## 2022-07-19 VITALS
BODY MASS INDEX: 30.39 KG/M2 | SYSTOLIC BLOOD PRESSURE: 130 MMHG | HEART RATE: 81 BPM | HEIGHT: 64 IN | WEIGHT: 178 LBS | DIASTOLIC BLOOD PRESSURE: 90 MMHG

## 2022-07-19 DIAGNOSIS — I50.32 CHRONIC DIASTOLIC HEART FAILURE: ICD-10-CM

## 2022-07-19 LAB
ASCENDING AORTA: 2.94 CM
AV INDEX (PROSTH): 0.48
AV MEAN GRADIENT: 13 MMHG
AV PEAK GRADIENT: 20 MMHG
AV VALVE AREA: 1.62 CM2
AV VELOCITY RATIO: 0.42
BSA FOR ECHO PROCEDURE: 1.91 M2
CV ECHO LV RWT: 0.38 CM
DOP CALC AO PEAK VEL: 2.24 M/S
DOP CALC AO VTI: 45.4 CM
DOP CALC LVOT AREA: 3.4 CM2
DOP CALC LVOT DIAMETER: 2.08 CM
DOP CALC LVOT PEAK VEL: 0.93 M/S
DOP CALC LVOT STROKE VOLUME: 73.49 CM3
DOP CALC MV VTI: 40.5 CM
DOP CALCLVOT PEAK VEL VTI: 21.64 CM
E WAVE DECELERATION TIME: 208.86 MSEC
E/A RATIO: 4.34
E/E' RATIO: 27.38 M/S
ECHO LV POSTERIOR WALL: 0.78 CM (ref 0.6–1.1)
EJECTION FRACTION: 65 %
FRACTIONAL SHORTENING: 37 % (ref 28–44)
HR MV ECHO: 81 BPM
INTERVENTRICULAR SEPTUM: 1.04 CM (ref 0.6–1.1)
IVRT: 94.2 MSEC
LA MAJOR: 6.04 CM
LA MINOR: 6.46 CM
LA WIDTH: 4.74 CM
LEFT ATRIUM SIZE: 4.45 CM
LEFT ATRIUM VOLUME INDEX MOD: 49.9 ML/M2
LEFT ATRIUM VOLUME INDEX: 60.2 ML/M2
LEFT ATRIUM VOLUME MOD: 92.75 CM3
LEFT ATRIUM VOLUME: 111.93 CM3
LEFT INTERNAL DIMENSION IN SYSTOLE: 2.61 CM (ref 2.1–4)
LEFT VENTRICLE DIASTOLIC VOLUME INDEX: 40.94 ML/M2
LEFT VENTRICLE DIASTOLIC VOLUME: 76.15 ML
LEFT VENTRICLE MASS INDEX: 63 G/M2
LEFT VENTRICLE SYSTOLIC VOLUME INDEX: 13.4 ML/M2
LEFT VENTRICLE SYSTOLIC VOLUME: 24.96 ML
LEFT VENTRICULAR INTERNAL DIMENSION IN DIASTOLE: 4.14 CM (ref 3.5–6)
LEFT VENTRICULAR MASS: 117.7 G
LV LATERAL E/E' RATIO: 22.25 M/S
LV SEPTAL E/E' RATIO: 35.6 M/S
MV MEAN GRADIENT: 5 MMHG
MV PEAK A VEL: 0.41 M/S
MV PEAK E VEL: 1.78 M/S
MV PEAK GRADIENT: 14 MMHG
MV STENOSIS PRESSURE HALF TIME: 60.57 MS
MV VALVE AREA BY CONTINUITY EQUATION: 1.81 CM2
MV VALVE AREA P 1/2 METHOD: 3.63 CM2
PISA TR MAX VEL: 4.09 M/S
PULM VEIN S/D RATIO: 0.42
PV PEAK D VEL: 0.66 M/S
PV PEAK S VEL: 0.28 M/S
RA MAJOR: 5.95 CM
RA PRESSURE: 3 MMHG
RA WIDTH: 3.68 CM
RIGHT VENTRICULAR END-DIASTOLIC DIMENSION: 3.8 CM
SINUS: 2.99 CM
STJ: 2.65 CM
TDI LATERAL: 0.08 M/S
TDI SEPTAL: 0.05 M/S
TDI: 0.07 M/S
TR MAX PG: 67 MMHG
TRICUSPID ANNULAR PLANE SYSTOLIC EXCURSION: 1.16 CM
TV REST PULMONARY ARTERY PRESSURE: 70 MMHG

## 2022-07-19 PROCEDURE — 93306 TTE W/DOPPLER COMPLETE: CPT | Mod: 26,,, | Performed by: INTERNAL MEDICINE

## 2022-07-19 PROCEDURE — 93306 ECHO (CUPID ONLY): ICD-10-PCS | Mod: 26,,, | Performed by: INTERNAL MEDICINE

## 2022-07-19 PROCEDURE — 93306 TTE W/DOPPLER COMPLETE: CPT

## 2022-07-20 ENCOUNTER — OFFICE VISIT (OUTPATIENT)
Dept: CARDIOLOGY | Facility: CLINIC | Age: 87
End: 2022-07-20
Payer: MEDICARE

## 2022-07-20 VITALS
WEIGHT: 175.25 LBS | SYSTOLIC BLOOD PRESSURE: 135 MMHG | HEIGHT: 63 IN | BODY MASS INDEX: 31.05 KG/M2 | DIASTOLIC BLOOD PRESSURE: 76 MMHG | HEART RATE: 77 BPM

## 2022-07-20 DIAGNOSIS — Z95.2 S/P TAVR (TRANSCATHETER AORTIC VALVE REPLACEMENT): Chronic | ICD-10-CM

## 2022-07-20 DIAGNOSIS — I50.32 CHRONIC DIASTOLIC HEART FAILURE: Primary | Chronic | ICD-10-CM

## 2022-07-20 DIAGNOSIS — Z95.1 HISTORY OF CORONARY ARTERY BYPASS GRAFT: Chronic | ICD-10-CM

## 2022-07-20 DIAGNOSIS — I48.20 CHRONIC ATRIAL FIBRILLATION: Chronic | ICD-10-CM

## 2022-07-20 DIAGNOSIS — I35.0 NONRHEUMATIC AORTIC VALVE STENOSIS: Chronic | ICD-10-CM

## 2022-07-20 DIAGNOSIS — I10 ESSENTIAL HYPERTENSION: Chronic | ICD-10-CM

## 2022-07-20 PROCEDURE — 99214 OFFICE O/P EST MOD 30 MIN: CPT | Mod: S$PBB,,, | Performed by: INTERNAL MEDICINE

## 2022-07-20 PROCEDURE — 99999 PR PBB SHADOW E&M-EST. PATIENT-LVL III: ICD-10-PCS | Mod: PBBFAC,,, | Performed by: INTERNAL MEDICINE

## 2022-07-20 PROCEDURE — 99999 PR PBB SHADOW E&M-EST. PATIENT-LVL III: CPT | Mod: PBBFAC,,, | Performed by: INTERNAL MEDICINE

## 2022-07-20 PROCEDURE — 99213 OFFICE O/P EST LOW 20 MIN: CPT | Mod: PBBFAC,PO | Performed by: INTERNAL MEDICINE

## 2022-07-20 PROCEDURE — 99214 PR OFFICE/OUTPT VISIT, EST, LEVL IV, 30-39 MIN: ICD-10-PCS | Mod: S$PBB,,, | Performed by: INTERNAL MEDICINE

## 2022-07-20 NOTE — PROGRESS NOTES
Subjective:   07/20/2022     Patient ID:  Damari Grant is a 89 y.o. female who presents for evaulation of chronic diastolic heart failure      She comes in now blood of shortness of breath.  On last visit, Jardiance 5 mg daily was added echocardiography does show normal TAVR valve function.  Pulmonary hypertension is present, probably secondary to diastolic dysfunction.  Laboratory studies showed versus affects after the addition last visit at a dose of 5 mg daily.     I had seen her 2 months ago for evaluation of shortness of breath.  Spironolactone was added to her medications.  She has lost weight since then and, fluid.  She was seen in Pulmonary by Dr. Cardenas, her chest x-ray had cleared which she felt was probably due to treatment of her diastolic heart failure.  Her shortness breath has improved.  She is not having chest pains or tightness.  She has not had edema.    In June of 2020 she had a TIA and underwent a left carotid stent placement.  She subsequently underwent a TAVR for severe symptomatic aortic valve stenosis.  Echocardiography in August of 2021 shows left ventricular function to be normal.  The peak pulmonary artery systolic pressure was 40 mm Hg and there was moderate mitral regurgitation.  Left atrium appeared to be moderately dilated consistent with diastolic dysfunction, in atrial fibrillation though.  Laboratory work showed normal CBC and complete metabolic profile.  The calculated aortic valve area was 1.9 centimeter squared, mean aortic valve gradient 6 mm Hg.    Coronary angiography from June of 2020 showed a 50% left main stenosis.  The LAD had an 80% origin stenosis and 90% proximal stenosis.  Circumflex had a 70% stenosis with severe disease in the 1st and 2nd marginal branches.  The right coronary artery had a 70% stenosis immediately proximal to the origin of the streak PDA which gave a small component of flow into the interventricular septum.  Strict PDA has a 70% origin stenosis.   PDA was small and had not been bypassed.  The left intermammary graft to the LAD was patent free of disease.  Saphenous vein graft to the diagonal was free of disease.  Saphenous vein graft to the 1st marginal was patent and free of disease.  Saphenous vein graft was grafted side-to-side to the 2nd marginal branch in end-to-side therapy marginal branch free of disease.  She was felt to have coronary artery disease medically treated.  She subsequently underwent carotid stenting and TAVR.    She does have chronic atrial fibrillation.  Her heart rate has been well controlled.  She has been anticoagulated with Eliquis.  She has not had bleeding problems.  She was also on aspirin, that has been discontinued    Hypercholesterolemia has been treated with PCSK9 inhibitor therapy.  She had been intolerant to statin therapy.  Currently on PCSK9 inhibitor every 2 weeks, last LDL 73.    She does have a history of iron deficiency, the last iron level was normal.      Shortness of Breath  Pertinent negatives include no chest pain, claudication, leg swelling, orthopnea, PND or syncope.   Atrial Fibrillation  Symptoms include shortness of breath. Symptoms are negative for chest pain, palpitations and syncope. Past medical history includes atrial fibrillation.       Patient Active Problem List   Diagnosis    Neuropathy    Hernia of abdominal wall    BOOP (bronchiolitis obliterans with organizing pneumonia)    Essential hypertension    Other constipation    Carotid artery disease    Nonrheumatic aortic valve stenosis    Chronic fatigue    Atherosclerosis of aorta    History of syncope    Risk for falls    Sensorineural hearing loss (SNHL) of both ears    Chronic atrial fibrillation    Arteriosclerosis of coronary artery    Pulmonary hypertension    History of left common carotid artery stent placement    S/P TAVR (transcatheter aortic valve replacement)    History of coronary artery bypass graft    Anticoagulation  management encounter    Orthostatic hypotension    Pure hypercholesterolemia    Statin intolerance    Acquired hypothyroidism    Chronic diastolic heart failure    Iron deficiency        Review of patient's allergies indicates:   Allergen Reactions    Codeine Nausea And Vomiting    Crestor [rosuvastatin] Other (See Comments)     Muscle weakness    Fosamax [alendronate] Other (See Comments)     Didn't tolerate over 10 years ago and doesn't remember what happened.     Livalo [pitavastatin] Other (See Comments)     Muscle pain    Simvastatin Other (See Comments)     Leg pains/ weakness         Current Outpatient Medications:     acetaminophen (TYLENOL) 500 MG tablet, Take 500 mg by mouth as needed., Disp: , Rfl:     diclofenac sodium (VOLTAREN) 1 % Gel, Apply 2 g topically 3 (three) times daily., Disp: 200 g, Rfl: 1    ELIQUIS 5 mg Tab, TAKE ONE BY MOUTH TWICE DAILY, Disp: 60 tablet, Rfl: 11    ergocalciferol (ERGOCALCIFEROL) 50,000 unit Cap, Take 1 capsule (50,000 Units total) by mouth every 7 days., Disp: 12 capsule, Rfl: 5    estradioL (ESTRACE) 0.01 % (0.1 mg/gram) vaginal cream, 1 g once daily for 1 week then as needed thereafter, Disp: 30 g, Rfl: 11    evolocumab (REPATHA SURECLICK) 140 mg/mL PnIj, Inject 1 mL (140 mg total) into the skin every 14 (fourteen) days. (Patient taking differently: Inject 140 mg into the skin every 14 (fourteen) days.), Disp: 6 mL, Rfl: 3    furosemide (LASIX) 20 MG tablet, Take 20 mg by mouth as needed., Disp: , Rfl:     levothyroxine (SYNTHROID) 25 MCG tablet, TAKE ONE DAILY BEFORE BREAKFAST, Disp: 90 tablet, Rfl: 1    metoprolol succinate (TOPROL-XL) 50 MG 24 hr tablet, TAKE (1/2) HALF BY MOUTH DAILY, Disp: 45 tablet, Rfl: 3    spironolactone (ALDACTONE) 25 MG tablet, Take 0.5 tablets (12.5 mg total) by mouth 2 (two) times daily with meals. (Patient taking differently: Take 12.5 mg by mouth once daily.), Disp: 90 tablet, Rfl: 3    vitamin K2 100 mcg Cap, Take 1  capsule by mouth Daily., Disp: , Rfl:     empagliflozin (JARDIANCE) 10 mg tablet, Take 1 tablet (10 mg total) by mouth once daily., Disp: 30 tablet, Rfl: 11     Objective:   Review of Systems   Constitutional: Positive for malaise/fatigue.   Cardiovascular: Positive for dyspnea on exertion. Negative for chest pain, claudication, cyanosis, irregular heartbeat, leg swelling, near-syncope, orthopnea, palpitations, paroxysmal nocturnal dyspnea and syncope.   Respiratory: Positive for shortness of breath.        Vitals:    07/20/22 1019   BP: 135/76   Pulse: 77       Physical Exam  Vitals reviewed.   Constitutional:       General: She is not in acute distress.     Appearance: She is well-developed.   HENT:      Head: Normocephalic and atraumatic.      Nose: Nose normal.   Eyes:      Conjunctiva/sclera: Conjunctivae normal.      Pupils: Pupils are equal, round, and reactive to light.   Neck:      Vascular: No hepatojugular reflux or JVD.      Comments: Jugular venous pressure is normal  Cardiovascular:      Rate and Rhythm: Normal rate. Rhythm irregular.      Pulses: Intact distal pulses.      Heart sounds: Murmur (Grade 1 systolic ejection murmur) heard.     No friction rub. No gallop.   Pulmonary:      Effort: Pulmonary effort is normal. No respiratory distress.      Breath sounds: Wheezing present. No rales.   Chest:      Chest wall: No tenderness.   Abdominal:      General: Bowel sounds are normal. There is no distension.      Palpations: Abdomen is soft.      Tenderness: There is no abdominal tenderness.   Musculoskeletal:         General: No tenderness or deformity. Normal range of motion.      Cervical back: Normal range of motion and neck supple.      Right lower leg: Edema (Trace right) present.      Left lower leg: Edema (tr+) present.   Skin:     General: Skin is warm and dry.      Findings: No erythema or rash.   Neurological:      Mental Status: She is alert and oriented to person, place, and time.       Cranial Nerves: No cranial nerve deficit.      Motor: No abnormal muscle tone.      Coordination: Coordination normal.   Psychiatric:         Behavior: Behavior normal.         Thought Content: Thought content normal.         Judgment: Judgment normal.       Component      Latest Ref Rng & Units 7/14/2022 6/29/2022 5/24/2022 3/29/2022   WBC      3.90 - 12.70 K/uL  4.66     RBC      4.00 - 5.40 M/uL  4.45     Hemoglobin      12.0 - 16.0 g/dL  13.0     Hematocrit      37.0 - 48.5 %  42.9     MCV      82 - 98 fL  96     MCH      27.0 - 31.0 pg  29.2     MCHC      32.0 - 36.0 g/dL  30.3 (L)     RDW      11.5 - 14.5 %  14.9 (H)     Platelets      150 - 450 K/uL  204     MPV      9.2 - 12.9 fL  12.6     Immature Granulocytes      0.0 - 0.5 %  0.4     Gran # (ANC)      1.8 - 7.7 K/uL  2.9     Immature Grans (Abs)      0.00 - 0.04 K/uL  0.02     Lymph #      1.0 - 4.8 K/uL  1.2     Mono #      0.3 - 1.0 K/uL  0.5     Eos #      0.0 - 0.5 K/uL  0.0     Baso #      0.00 - 0.20 K/uL  0.03     nRBC      0 /100 WBC  0     Gran %      38.0 - 73.0 %  62.9     Lymph %      18.0 - 48.0 %  25.1     Mono %      4.0 - 15.0 %  10.1     Eosinophil %      0.0 - 8.0 %  0.9     Basophil %      0.0 - 1.9 %  0.6     Differential Method        Automated     Sodium      136 - 145 mmol/L 139 139 137    Potassium      3.5 - 5.1 mmol/L 4.4 4.4 4.6    Chloride      95 - 110 mmol/L 105 103 104    CO2      23 - 29 mmol/L 27 27 26    Glucose      70 - 110 mg/dL 104 98 110    BUN      8 - 23 mg/dL 16 12 12    Creatinine      0.5 - 1.4 mg/dL 0.8 0.8 0.8    Calcium      8.7 - 10.5 mg/dL 10.2 10.0 9.8    Anion Gap      8 - 16 mmol/L 7 (L) 9 7 (L)    eGFR if African American      >60 mL/min/1.73 m:2 >60.0 >60.0 >60.0    eGFR if non African American      >60 mL/min/1.73 m:2 >60.0 >60.0 >60.0    Magnesium      1.6 - 2.6 mg/dL    1.9   D-Dimer      <0.50 mg/L FEU    0.42   NT-proBNP      <=540 pg/mL  1030 (H) 886 (H)      Lab Results   Component Value Date      07/14/2022    K 4.4 07/14/2022     07/14/2022    CO2 27 07/14/2022    BUN 16 07/14/2022    CREATININE 0.8 07/14/2022     07/14/2022    HGBA1C 5.7 (H) 11/09/2021    MG 1.9 03/29/2022    AST 17 03/29/2022    ALT 13 03/29/2022    ALBUMIN 3.9 03/29/2022    PROT 7.4 03/29/2022    BILITOT 0.6 03/29/2022    WBC 4.66 06/29/2022    HGB 13.0 06/29/2022    HCT 42.9 06/29/2022    MCV 96 06/29/2022     06/29/2022    TSH 2.802 12/14/2021    CHOL 148 12/14/2021    HDL 49 12/14/2021    LDLCALC 73.2 12/14/2021    TRIG 129 12/14/2021     Assessment and Plan:     Chronic diastolic heart failure  Comments:  Appears improved with SG LT 2 inhibitor, increase Jardiance to 10 mg daily  Orders:  -     empagliflozin (JARDIANCE) 10 mg tablet; Take 1 tablet (10 mg total) by mouth once daily.  Dispense: 30 tablet; Refill: 11    Essential hypertension  Comments:  Well controlled    History of coronary artery bypass graft  Comments:  Currently asymptomatic    Nonrheumatic aortic valve stenosis    S/P TAVR (transcatheter aortic valve replacement)    Chronic atrial fibrillation  Comments:  Rate controlled           Return to clinic as scheduled in September  No follow-ups on file.

## 2022-08-02 ENCOUNTER — TELEPHONE (OUTPATIENT)
Dept: INTERNAL MEDICINE | Facility: CLINIC | Age: 87
End: 2022-08-02
Payer: MEDICARE

## 2022-08-02 ENCOUNTER — PATIENT MESSAGE (OUTPATIENT)
Dept: CARDIOLOGY | Facility: CLINIC | Age: 87
End: 2022-08-02
Payer: MEDICARE

## 2022-08-02 ENCOUNTER — PATIENT MESSAGE (OUTPATIENT)
Dept: INTERNAL MEDICINE | Facility: CLINIC | Age: 87
End: 2022-08-02
Payer: MEDICARE

## 2022-08-09 DIAGNOSIS — Z78.0 MENOPAUSE: ICD-10-CM

## 2022-08-09 DIAGNOSIS — Z12.31 BREAST CANCER SCREENING BY MAMMOGRAM: Primary | ICD-10-CM

## 2022-08-09 DIAGNOSIS — Z13.820 SPECIAL SCREENING FOR OSTEOPOROSIS: ICD-10-CM

## 2022-09-13 ENCOUNTER — OFFICE VISIT (OUTPATIENT)
Dept: CARDIOLOGY | Facility: CLINIC | Age: 87
End: 2022-09-13
Payer: MEDICARE

## 2022-09-13 VITALS
SYSTOLIC BLOOD PRESSURE: 150 MMHG | DIASTOLIC BLOOD PRESSURE: 80 MMHG | HEART RATE: 75 BPM | BODY MASS INDEX: 31.02 KG/M2 | HEIGHT: 63 IN | WEIGHT: 175.06 LBS

## 2022-09-13 DIAGNOSIS — E66.09 CLASS 1 OBESITY DUE TO EXCESS CALORIES WITHOUT SERIOUS COMORBIDITY WITH BODY MASS INDEX (BMI) OF 31.0 TO 31.9 IN ADULT: ICD-10-CM

## 2022-09-13 DIAGNOSIS — Z78.9 STATIN INTOLERANCE: ICD-10-CM

## 2022-09-13 DIAGNOSIS — I50.32 CHRONIC DIASTOLIC HEART FAILURE: Primary | Chronic | ICD-10-CM

## 2022-09-13 DIAGNOSIS — E78.00 PURE HYPERCHOLESTEROLEMIA: ICD-10-CM

## 2022-09-13 DIAGNOSIS — Z95.2 S/P TAVR (TRANSCATHETER AORTIC VALVE REPLACEMENT): Chronic | ICD-10-CM

## 2022-09-13 DIAGNOSIS — I48.20 CHRONIC ATRIAL FIBRILLATION: Chronic | ICD-10-CM

## 2022-09-13 DIAGNOSIS — Z95.1 HISTORY OF CORONARY ARTERY BYPASS GRAFT: ICD-10-CM

## 2022-09-13 DIAGNOSIS — Z95.828 HISTORY OF LEFT COMMON CAROTID ARTERY STENT PLACEMENT: ICD-10-CM

## 2022-09-13 DIAGNOSIS — Z98.890 HISTORY OF LEFT COMMON CAROTID ARTERY STENT PLACEMENT: ICD-10-CM

## 2022-09-13 DIAGNOSIS — I25.10 ARTERIOSCLEROSIS OF CORONARY ARTERY: Chronic | ICD-10-CM

## 2022-09-13 PROBLEM — E66.01 MORBID (SEVERE) OBESITY DUE TO EXCESS CALORIES: Status: ACTIVE | Noted: 2022-09-13

## 2022-09-13 PROCEDURE — 99213 OFFICE O/P EST LOW 20 MIN: CPT | Mod: PBBFAC,PO | Performed by: INTERNAL MEDICINE

## 2022-09-13 PROCEDURE — 99999 PR PBB SHADOW E&M-EST. PATIENT-LVL III: CPT | Mod: PBBFAC,,, | Performed by: INTERNAL MEDICINE

## 2022-09-13 PROCEDURE — 99999 PR PBB SHADOW E&M-EST. PATIENT-LVL III: ICD-10-PCS | Mod: PBBFAC,,, | Performed by: INTERNAL MEDICINE

## 2022-09-13 PROCEDURE — 99214 OFFICE O/P EST MOD 30 MIN: CPT | Mod: S$PBB,,, | Performed by: INTERNAL MEDICINE

## 2022-09-13 PROCEDURE — 99214 PR OFFICE/OUTPT VISIT, EST, LEVL IV, 30-39 MIN: ICD-10-PCS | Mod: S$PBB,,, | Performed by: INTERNAL MEDICINE

## 2022-09-13 NOTE — PROGRESS NOTES
Subjective:   09/13/2022     Patient ID:  Damari Grant is a 89 y.o. female who presents for evaulation of s/p TAVR, Hypertension, Congestive Heart Failure, s/p CABG, and Atrial Fibrillation      She comes in now for follow-up of shortness of breath.  On previous visit, Jardiance 5 mg daily was added; echocardiography does show normal TAVR valve function.  Pulmonary hypertension is present, probably secondary to diastolic dysfunction.   Spironolactone had added to her medications.  She has lost weight since then and, fluid.  She was seen in Pulmonary by Dr. Cardenas, her chest x-ray had cleared which she felt was probably due to treatment of her diastolic heart failure.  Her shortness breath has improved.  She is not having chest pains or tightness.  She has not had edema.    In June of 2020 she had a TIA and underwent a left carotid stent placement.  She subsequently underwent a TAVR for severe symptomatic aortic valve stenosis.  Echocardiography in August of 2021 shows left ventricular function to be normal.  The peak pulmonary artery systolic pressure was 40 mm Hg and there was moderate mitral regurgitation.  Left atrium appeared to be moderately dilated consistent with diastolic dysfunction, in atrial fibrillation though.  Laboratory work showed normal CBC and complete metabolic profile.  The calculated aortic valve area was 1.9 centimeter squared, mean aortic valve gradient 6 mm Hg.    Coronary angiography from June of 2020 showed a 50% left main stenosis.  The LAD had an 80% origin stenosis and 90% proximal stenosis.  Circumflex had a 70% stenosis with severe disease in the 1st and 2nd marginal branches.  The right coronary artery had a 70% stenosis immediately proximal to the origin of the streak PDA which gave a small component of flow into the interventricular septum.  Strict PDA has a 70% origin stenosis.  PDA was small and had not been bypassed.  The left intermammary graft to the LAD was patent free of  disease.  Saphenous vein graft to the diagonal was free of disease.  Saphenous vein graft to the 1st marginal was patent and free of disease.  Saphenous vein graft was grafted side-to-side to the 2nd marginal branch in end-to-side therapy marginal branch free of disease.  She was felt to have coronary artery disease medically treated.  She subsequently underwent carotid stenting and TAVR.    She does have chronic atrial fibrillation.  Her heart rate has been well controlled.  She has been anticoagulated with Eliquis.  She has not had bleeding problems.  She was also on aspirin, that has been discontinued    Hypercholesterolemia has been treated with PCSK9 inhibitor therapy.  She had been intolerant to statin therapy.  Currently on PCSK9 inhibitor every 2 weeks, last LDL 73.    She does have a history of iron deficiency, the last iron level was normal.      Shortness of Breath  Pertinent negatives include no chest pain, claudication, leg swelling, orthopnea, PND or syncope.   Atrial Fibrillation  Symptoms include shortness of breath. Symptoms are negative for chest pain, palpitations and syncope. Past medical history includes CHF.   Hypertension  Associated symptoms include malaise/fatigue and shortness of breath. Pertinent negatives include no chest pain, orthopnea, palpitations or PND.   Congestive Heart Failure  Associated symptoms include shortness of breath. Pertinent negatives include no chest pain, claudication, near-syncope or palpitations.     Patient Active Problem List   Diagnosis    Neuropathy    Hernia of abdominal wall    BOOP (bronchiolitis obliterans with organizing pneumonia)    Essential hypertension    Other constipation    Carotid artery disease    Nonrheumatic aortic valve stenosis    Chronic fatigue    Atherosclerosis of aorta    History of syncope    Risk for falls    Sensorineural hearing loss (SNHL) of both ears    Chronic atrial fibrillation    Arteriosclerosis of coronary artery     Pulmonary hypertension    History of left common carotid artery stent placement    S/P TAVR (transcatheter aortic valve replacement)    History of coronary artery bypass graft    Anticoagulation management encounter    Orthostatic hypotension    Pure hypercholesterolemia    Statin intolerance    Acquired hypothyroidism    Chronic diastolic heart failure    Iron deficiency    Morbid (severe) obesity due to excess calories        Review of patient's allergies indicates:   Allergen Reactions    Codeine Nausea And Vomiting    Crestor [rosuvastatin] Other (See Comments)     Muscle weakness    Fosamax [alendronate] Other (See Comments)     Didn't tolerate over 10 years ago and doesn't remember what happened.     Livalo [pitavastatin] Other (See Comments)     Muscle pain    Simvastatin Other (See Comments)     Leg pains/ weakness         Current Outpatient Medications:     acetaminophen (TYLENOL) 500 MG tablet, Take 500 mg by mouth as needed., Disp: , Rfl:     diclofenac sodium (VOLTAREN) 1 % Gel, Apply 2 g topically 3 (three) times daily., Disp: 200 g, Rfl: 1    ELIQUIS 5 mg Tab, TAKE ONE BY MOUTH TWICE DAILY, Disp: 60 tablet, Rfl: 11    ergocalciferol (ERGOCALCIFEROL) 50,000 unit Cap, Take 1 capsule (50,000 Units total) by mouth every 7 days., Disp: 12 capsule, Rfl: 5    estradioL (ESTRACE) 0.01 % (0.1 mg/gram) vaginal cream, 1 g once daily for 1 week then as needed thereafter, Disp: 30 g, Rfl: 11    furosemide (LASIX) 20 MG tablet, Take 20 mg by mouth as needed., Disp: , Rfl:     levothyroxine (SYNTHROID) 25 MCG tablet, TAKE ONE DAILY BEFORE BREAKFAST, Disp: 90 tablet, Rfl: 1    metoprolol succinate (TOPROL-XL) 50 MG 24 hr tablet, TAKE (1/2) HALF BY MOUTH DAILY, Disp: 45 tablet, Rfl: 3    spironolactone (ALDACTONE) 25 MG tablet, Take 0.5 tablets (12.5 mg total) by mouth 2 (two) times daily with meals. (Patient taking differently: Take 25 mg by mouth once daily. Takes 0.5 mg two times a day), Disp: 90 tablet, Rfl: 3     vitamin K2 100 mcg Cap, Take 1 capsule by mouth Daily., Disp: , Rfl:      Objective:   Review of Systems   Constitutional: Positive for malaise/fatigue.   Cardiovascular:  Positive for dyspnea on exertion. Negative for chest pain, claudication, cyanosis, irregular heartbeat, leg swelling, near-syncope, orthopnea, palpitations, paroxysmal nocturnal dyspnea and syncope.   Respiratory:  Positive for shortness of breath.      Vitals:    09/13/22 0827   BP: (!) 150/80   Pulse: 75       Physical Exam  Vitals reviewed.   Constitutional:       General: She is not in acute distress.     Appearance: She is well-developed.   HENT:      Head: Normocephalic and atraumatic.      Nose: Nose normal.   Eyes:      Conjunctiva/sclera: Conjunctivae normal.      Pupils: Pupils are equal, round, and reactive to light.   Neck:      Vascular: No hepatojugular reflux or JVD.      Comments: Jugular venous pressure is normal  Cardiovascular:      Rate and Rhythm: Normal rate. Rhythm irregular.      Pulses: Intact distal pulses.      Heart sounds: Murmur (Grade 1 systolic ejection murmur) heard.     No friction rub. No gallop.   Pulmonary:      Effort: Pulmonary effort is normal. No respiratory distress.      Breath sounds: Wheezing present. No rales.   Chest:      Chest wall: No tenderness.   Abdominal:      General: Bowel sounds are normal. There is no distension.      Palpations: Abdomen is soft.      Tenderness: There is no abdominal tenderness.   Musculoskeletal:         General: No tenderness or deformity. Normal range of motion.      Cervical back: Normal range of motion and neck supple.      Right lower leg: Edema (Trace right) present.      Left lower leg: Edema (tr+) present.   Skin:     General: Skin is warm and dry.      Findings: No erythema or rash.   Neurological:      Mental Status: She is alert and oriented to person, place, and time.      Cranial Nerves: No cranial nerve deficit.      Motor: No abnormal muscle tone.       Coordination: Coordination normal.   Psychiatric:         Behavior: Behavior normal.         Thought Content: Thought content normal.         Judgment: Judgment normal.     Component      Latest Ref Rng & Units 7/14/2022 6/29/2022 5/24/2022 3/29/2022   WBC      3.90 - 12.70 K/uL  4.66     RBC      4.00 - 5.40 M/uL  4.45     Hemoglobin      12.0 - 16.0 g/dL  13.0     Hematocrit      37.0 - 48.5 %  42.9     MCV      82 - 98 fL  96     MCH      27.0 - 31.0 pg  29.2     MCHC      32.0 - 36.0 g/dL  30.3 (L)     RDW      11.5 - 14.5 %  14.9 (H)     Platelets      150 - 450 K/uL  204     MPV      9.2 - 12.9 fL  12.6     Immature Granulocytes      0.0 - 0.5 %  0.4     Gran # (ANC)      1.8 - 7.7 K/uL  2.9     Immature Grans (Abs)      0.00 - 0.04 K/uL  0.02     Lymph #      1.0 - 4.8 K/uL  1.2     Mono #      0.3 - 1.0 K/uL  0.5     Eos #      0.0 - 0.5 K/uL  0.0     Baso #      0.00 - 0.20 K/uL  0.03     nRBC      0 /100 WBC  0     Gran %      38.0 - 73.0 %  62.9     Lymph %      18.0 - 48.0 %  25.1     Mono %      4.0 - 15.0 %  10.1     Eosinophil %      0.0 - 8.0 %  0.9     Basophil %      0.0 - 1.9 %  0.6     Differential Method        Automated     Sodium      136 - 145 mmol/L 139 139 137    Potassium      3.5 - 5.1 mmol/L 4.4 4.4 4.6    Chloride      95 - 110 mmol/L 105 103 104    CO2      23 - 29 mmol/L 27 27 26    Glucose      70 - 110 mg/dL 104 98 110    BUN      8 - 23 mg/dL 16 12 12    Creatinine      0.5 - 1.4 mg/dL 0.8 0.8 0.8    Calcium      8.7 - 10.5 mg/dL 10.2 10.0 9.8    Anion Gap      8 - 16 mmol/L 7 (L) 9 7 (L)    eGFR if African American      >60 mL/min/1.73 m:2 >60.0 >60.0 >60.0    eGFR if non African American      >60 mL/min/1.73 m:2 >60.0 >60.0 >60.0    Magnesium      1.6 - 2.6 mg/dL    1.9   D-Dimer      <0.50 mg/L FEU    0.42   NT-proBNP      <=540 pg/mL  1030 (H) 886 (H)      Lab Results   Component Value Date     07/14/2022    K 4.4 07/14/2022     07/14/2022    CO2 27 07/14/2022     BUN 16 07/14/2022    CREATININE 0.8 07/14/2022     07/14/2022    HGBA1C 5.7 (H) 11/09/2021    MG 1.9 03/29/2022    AST 17 03/29/2022    ALT 13 03/29/2022    ALBUMIN 3.9 03/29/2022    PROT 7.4 03/29/2022    BILITOT 0.6 03/29/2022    WBC 4.66 06/29/2022    HGB 13.0 06/29/2022    HCT 42.9 06/29/2022    MCV 96 06/29/2022     06/29/2022    TSH 2.802 12/14/2021    CHOL 148 12/14/2021    HDL 49 12/14/2021    LDLCALC 73.2 12/14/2021    TRIG 129 12/14/2021     Assessment and Plan:     Chronic diastolic heart failure  Comments:  Intolerant of SG LT 2 inhibitor due to UTI  Spironolactone to be continued    Arteriosclerosis of coronary artery  Comments:  No angina    History of coronary artery bypass graft    Pure hypercholesterolemia  Comments:  On PCSK9 inhibitor, last LDL 73    Statin intolerance    History of left common carotid artery stent placement    S/P TAVR (transcatheter aortic valve replacement)  Comments:  Normal function    Class 1 obesity due to excess calories without serious comorbidity with body mass index (BMI) of 31.0 to 31.9 in adult  Comments:  Losing weight    Chronic atrial fibrillation  Comments:  Rate controlled         Follow up in about 4 months (around 1/13/2023).

## 2022-10-21 ENCOUNTER — PATIENT MESSAGE (OUTPATIENT)
Dept: INTERNAL MEDICINE | Facility: CLINIC | Age: 87
End: 2022-10-21
Payer: MEDICARE

## 2022-10-21 RX ORDER — NYSTATIN AND TRIAMCINOLONE ACETONIDE 100000; 1 [USP'U]/G; MG/G
CREAM TOPICAL 4 TIMES DAILY
COMMUNITY
End: 2022-10-21 | Stop reason: DRUGHIGH

## 2022-10-21 NOTE — TELEPHONE ENCOUNTER
Last OV 4-7-22    Pt's daughter is requesting the Rx below  Pt's gyn provider retired.    Please advise

## 2022-10-24 RX ORDER — NYSTATIN AND TRIAMCINOLONE ACETONIDE 100000; 1 [USP'U]/G; MG/G
CREAM TOPICAL 2 TIMES DAILY PRN
Qty: 60 G | Refills: 5 | Status: SHIPPED | OUTPATIENT
Start: 2022-10-24 | End: 2023-07-10

## 2022-11-07 NOTE — TELEPHONE ENCOUNTER
----- Message from Iva Huitron sent at 8/2/2022 10:00 AM CDT -----  Contact: Sujata(daughter)[phone]  Patient would like to get medical advice.  Symptoms (please be specific):   UTI with burning   How long have you had these symptoms: 07/30  Would you like a call back, or a response through your [facility] portal?:   call back   Pharmacy name and phone # (copy from chart):     [facility] ([place]) - [place] - [address]  [address] [place] [id number]  Phone: [phone] Fax: [phone]   Comments:   Pt daughter states she picked up a specimen cup and is requesting urine lab orders.           
----- Message from oRsio Ngo sent at 8/2/2022  7:11 AM CDT -----  Contact: Sujata/Daughter [phone]  Patient is experiencing burning when urinating. Requesting UA orders and would like to come and  a cup and a hat.    Please call and advise.    Thank You      
I spoke to pt's daughter, she was advised pt will need a OV.  Dr. Langley increased pt's Rx of Jardiance and dauther feels likes this is the cause of burning sensation.  She was advised to call/message cardiologist staff for advice.  She verbalized understanding  
none

## 2022-12-05 ENCOUNTER — PATIENT MESSAGE (OUTPATIENT)
Dept: INTERNAL MEDICINE | Facility: CLINIC | Age: 87
End: 2022-12-05
Payer: MEDICARE

## 2022-12-05 DIAGNOSIS — R35.0 INCREASED URINARY FREQUENCY: Primary | ICD-10-CM

## 2022-12-05 DIAGNOSIS — E55.9 VITAMIN D DEFICIENCY: ICD-10-CM

## 2022-12-05 DIAGNOSIS — I10 ESSENTIAL HYPERTENSION: ICD-10-CM

## 2022-12-05 DIAGNOSIS — R73.03 PREDIABETES: ICD-10-CM

## 2022-12-05 DIAGNOSIS — Z00.00 PHYSICAL EXAM, ANNUAL: ICD-10-CM

## 2022-12-05 NOTE — TELEPHONE ENCOUNTER
Pt is scheduled for annual visit on 12-13-22.  Labs ordered and scheduled on 12-9-22    I spoke to pt's daughter and notified her of the above

## 2022-12-09 ENCOUNTER — LAB VISIT (OUTPATIENT)
Dept: LAB | Facility: HOSPITAL | Age: 87
End: 2022-12-09
Attending: STUDENT IN AN ORGANIZED HEALTH CARE EDUCATION/TRAINING PROGRAM
Payer: MEDICARE

## 2022-12-09 DIAGNOSIS — E55.9 VITAMIN D DEFICIENCY: ICD-10-CM

## 2022-12-09 DIAGNOSIS — I10 ESSENTIAL HYPERTENSION: ICD-10-CM

## 2022-12-09 DIAGNOSIS — Z00.00 PHYSICAL EXAM, ANNUAL: ICD-10-CM

## 2022-12-09 DIAGNOSIS — R73.03 PREDIABETES: ICD-10-CM

## 2022-12-09 LAB
25(OH)D3+25(OH)D2 SERPL-MCNC: 33 NG/ML (ref 30–96)
ALBUMIN SERPL BCP-MCNC: 3.9 G/DL (ref 3.5–5.2)
ALP SERPL-CCNC: 57 U/L (ref 55–135)
ALT SERPL W/O P-5'-P-CCNC: 15 U/L (ref 10–44)
ANION GAP SERPL CALC-SCNC: 7 MMOL/L (ref 8–16)
AST SERPL-CCNC: 20 U/L (ref 10–40)
BASOPHILS # BLD AUTO: 0.02 K/UL (ref 0–0.2)
BASOPHILS NFR BLD: 0.6 % (ref 0–1.9)
BILIRUB SERPL-MCNC: 1.2 MG/DL (ref 0.1–1)
BUN SERPL-MCNC: 12 MG/DL (ref 10–30)
CALCIUM SERPL-MCNC: 10.4 MG/DL (ref 8.7–10.5)
CHLORIDE SERPL-SCNC: 104 MMOL/L (ref 95–110)
CHOLEST SERPL-MCNC: 148 MG/DL (ref 120–199)
CHOLEST/HDLC SERPL: 3 {RATIO} (ref 2–5)
CO2 SERPL-SCNC: 26 MMOL/L (ref 23–29)
CREAT SERPL-MCNC: 0.8 MG/DL (ref 0.5–1.4)
DIFFERENTIAL METHOD: ABNORMAL
EOSINOPHIL # BLD AUTO: 0 K/UL (ref 0–0.5)
EOSINOPHIL NFR BLD: 0.8 % (ref 0–8)
ERYTHROCYTE [DISTWIDTH] IN BLOOD BY AUTOMATED COUNT: 14 % (ref 11.5–14.5)
EST. GFR  (NO RACE VARIABLE): >60 ML/MIN/1.73 M^2
ESTIMATED AVG GLUCOSE: 111 MG/DL (ref 68–131)
GLUCOSE SERPL-MCNC: 111 MG/DL (ref 70–110)
HBA1C MFR BLD: 5.5 % (ref 4–5.6)
HCT VFR BLD AUTO: 46.3 % (ref 37–48.5)
HDLC SERPL-MCNC: 50 MG/DL (ref 40–75)
HDLC SERPL: 33.8 % (ref 20–50)
HGB BLD-MCNC: 14.3 G/DL (ref 12–16)
IMM GRANULOCYTES # BLD AUTO: 0.02 K/UL (ref 0–0.04)
IMM GRANULOCYTES NFR BLD AUTO: 0.6 % (ref 0–0.5)
LDLC SERPL CALC-MCNC: 80.6 MG/DL (ref 63–159)
LYMPHOCYTES # BLD AUTO: 1 K/UL (ref 1–4.8)
LYMPHOCYTES NFR BLD: 28.8 % (ref 18–48)
MCH RBC QN AUTO: 29.4 PG (ref 27–31)
MCHC RBC AUTO-ENTMCNC: 30.9 G/DL (ref 32–36)
MCV RBC AUTO: 95 FL (ref 82–98)
MONOCYTES # BLD AUTO: 0.4 K/UL (ref 0.3–1)
MONOCYTES NFR BLD: 10.2 % (ref 4–15)
NEUTROPHILS # BLD AUTO: 2.1 K/UL (ref 1.8–7.7)
NEUTROPHILS NFR BLD: 59 % (ref 38–73)
NONHDLC SERPL-MCNC: 98 MG/DL
NRBC BLD-RTO: 0 /100 WBC
PLATELET # BLD AUTO: 166 K/UL (ref 150–450)
PMV BLD AUTO: 12.5 FL (ref 9.2–12.9)
POTASSIUM SERPL-SCNC: 4.8 MMOL/L (ref 3.5–5.1)
PROT SERPL-MCNC: 7.4 G/DL (ref 6–8.4)
RBC # BLD AUTO: 4.87 M/UL (ref 4–5.4)
SODIUM SERPL-SCNC: 137 MMOL/L (ref 136–145)
T4 FREE SERPL-MCNC: 0.93 NG/DL (ref 0.71–1.51)
TRIGL SERPL-MCNC: 87 MG/DL (ref 30–150)
TSH SERPL DL<=0.005 MIU/L-ACNC: 2.79 UIU/ML (ref 0.4–4)
WBC # BLD AUTO: 3.54 K/UL (ref 3.9–12.7)

## 2022-12-09 PROCEDURE — 83036 HEMOGLOBIN GLYCOSYLATED A1C: CPT | Performed by: STUDENT IN AN ORGANIZED HEALTH CARE EDUCATION/TRAINING PROGRAM

## 2022-12-09 PROCEDURE — 85025 COMPLETE CBC W/AUTO DIFF WBC: CPT | Performed by: STUDENT IN AN ORGANIZED HEALTH CARE EDUCATION/TRAINING PROGRAM

## 2022-12-09 PROCEDURE — 36415 COLL VENOUS BLD VENIPUNCTURE: CPT | Mod: PO | Performed by: STUDENT IN AN ORGANIZED HEALTH CARE EDUCATION/TRAINING PROGRAM

## 2022-12-09 PROCEDURE — 84443 ASSAY THYROID STIM HORMONE: CPT | Performed by: STUDENT IN AN ORGANIZED HEALTH CARE EDUCATION/TRAINING PROGRAM

## 2022-12-09 PROCEDURE — 84439 ASSAY OF FREE THYROXINE: CPT | Performed by: STUDENT IN AN ORGANIZED HEALTH CARE EDUCATION/TRAINING PROGRAM

## 2022-12-09 PROCEDURE — 82306 VITAMIN D 25 HYDROXY: CPT | Performed by: STUDENT IN AN ORGANIZED HEALTH CARE EDUCATION/TRAINING PROGRAM

## 2022-12-09 PROCEDURE — 80061 LIPID PANEL: CPT | Performed by: STUDENT IN AN ORGANIZED HEALTH CARE EDUCATION/TRAINING PROGRAM

## 2022-12-09 PROCEDURE — 80053 COMPREHEN METABOLIC PANEL: CPT | Performed by: STUDENT IN AN ORGANIZED HEALTH CARE EDUCATION/TRAINING PROGRAM

## 2022-12-12 NOTE — PROGRESS NOTES
Subjective:     Chief Complaint: Annual Exam    HPI  Ms. Grant is a very nice 89 yo F with SNHL, BOOP,  TIA, aortic atherosclerosis s/p TAVR with 2/2 LBBB), Afib, CAD s/p CABG, HTN, HLD, atrophic vaginitis with relapsing remitting chronic dysuria (Estrace screening), severe carotid artery stenosis (L-70/80%; R- 30/40%) s/p stenting of L-internal carotid-A, aortic stenosis, hypothyroidism presenting for annual physical    Annual screening labs gathered in preparation for this appointment:  -urinalysis, microalbuminuria screening, vitamin-D, TSH/T4, and A1c: Within normal limits   -CBC/CMP:  Trivial abnormalities only    Family, social, surgical Hx reviewed     Health Maintenance:  Due for Tdap, COVID booster, and DEXA (ordered 4 months ago)    Past Medical History:   Diagnosis Date    Syncope and collapse      Past Surgical History:   Procedure Laterality Date    ADENOIDECTOMY      AORTIC VALVE REPLACEMENT      APPENDECTOMY      CARDIAC VALVE REPLACEMENT      CARDIAC VALVE SURGERY      CORONARY ARTERY BYPASS GRAFT      HYSTERECTOMY      TONSILLECTOMY       Family History   Problem Relation Age of Onset    Cancer Mother      Social History     Socioeconomic History    Marital status:    Tobacco Use    Smoking status: Never    Smokeless tobacco: Never   Substance and Sexual Activity    Alcohol use: Not Currently     Alcohol/week: 0.0 standard drinks    Drug use: No    Sexual activity: Not Currently     Partners: Male     Review of patient's allergies indicates:   Allergen Reactions    Codeine Nausea And Vomiting    Crestor [rosuvastatin] Other (See Comments)     Muscle weakness    Fosamax [alendronate] Other (See Comments)     Didn't tolerate over 10 years ago and doesn't remember what happened.     Livalo [pitavastatin] Other (See Comments)     Muscle pain    Simvastatin Other (See Comments)     Leg pains/ weakness     Damari Grant had no medications administered during this visit.    Review of Systems    Constitutional:  Negative for activity change and unexpected weight change.   HENT:  Negative for hearing loss, rhinorrhea and trouble swallowing.    Eyes:  Negative for discharge and visual disturbance.   Respiratory:  Negative for chest tightness and wheezing.    Cardiovascular:  Negative for chest pain and palpitations.   Gastrointestinal:  Negative for blood in stool, constipation, diarrhea and vomiting.   Endocrine: Negative for polydipsia and polyuria.   Genitourinary:  Negative for difficulty urinating, dysuria, hematuria and menstrual problem.   Musculoskeletal:  Negative for arthralgias, joint swelling and neck pain.   Neurological:  Negative for weakness and headaches.   Psychiatric/Behavioral:  Negative for confusion and dysphoric mood.        Objective:      Vitals:    12/13/22 0944   BP: 138/84   Pulse: 88   Resp: 15   Temp: 97.7 °F (36.5 °C)      Physical Exam  Vitals reviewed.   Constitutional:       General: She is not in acute distress.     Appearance: Normal appearance.   HENT:      Head: Normocephalic and atraumatic.      Comments: Facial features are symmetric      Nose: Nose normal. No congestion or rhinorrhea.      Mouth/Throat:      Mouth: Mucous membranes are moist.      Pharynx: Oropharynx is clear. No oropharyngeal exudate or posterior oropharyngeal erythema.   Eyes:      General: No scleral icterus.     Extraocular Movements: Extraocular movements intact.      Conjunctiva/sclera: Conjunctivae normal.   Cardiovascular:      Rate and Rhythm: Normal rate and regular rhythm.      Pulses: Normal pulses.      Heart sounds: Normal heart sounds.   Pulmonary:      Effort: Pulmonary effort is normal. No respiratory distress.      Breath sounds: Normal breath sounds.   Musculoskeletal:         General: No deformity or signs of injury. Normal range of motion.      Cervical back: Normal range of motion.      Comments: Gait normal    Skin:     General: Skin is warm and dry.      Findings: No rash.    Neurological:      General: No focal deficit present.      Mental Status: She is alert and oriented to person, place, and time. Mental status is at baseline.   Psychiatric:         Mood and Affect: Mood normal.         Behavior: Behavior normal.         Thought Content: Thought content normal.     Current Outpatient Medications on File Prior to Visit   Medication Sig Dispense Refill    acetaminophen (TYLENOL) 500 MG tablet Take 500 mg by mouth as needed.      ELIQUIS 5 mg Tab TAKE ONE BY MOUTH TWICE DAILY 60 tablet 11    ergocalciferol (ERGOCALCIFEROL) 50,000 unit Cap Take 1 capsule (50,000 Units total) by mouth every 7 days. 12 capsule 5    evolocumab (REPATHA SURECLICK) 140 mg/mL PnIj Inject 1 mL (140 mg total) into the skin every 14 (fourteen) days. 6 mL 3    levothyroxine (SYNTHROID) 25 MCG tablet TAKE ONE DAILY BEFORE BREAKFAST 90 tablet 0    metoprolol succinate (TOPROL-XL) 50 MG 24 hr tablet TAKE (1/2) HALF BY MOUTH DAILY 45 tablet 3    nystatin-triamcinolone (MYCOLOG II) cream Apply topically 2 (two) times daily as needed. Apply to affected area as needed 60 g 5    spironolactone (ALDACTONE) 25 MG tablet Take 0.5 tablets (12.5 mg total) by mouth 2 (two) times daily with meals. (Patient taking differently: Take 25 mg by mouth once daily. Takes 0.5 mg two times a day) 90 tablet 3    diclofenac sodium (VOLTAREN) 1 % Gel Apply 2 g topically 3 (three) times daily. (Patient not taking: Reported on 12/13/2022) 200 g 1    estradioL (ESTRACE) 0.01 % (0.1 mg/gram) vaginal cream 1 g once daily for 1 week then as needed thereafter (Patient not taking: Reported on 12/13/2022) 30 g 11    furosemide (LASIX) 20 MG tablet Take 20 mg by mouth as needed.      vitamin K2 100 mcg Cap Take 1 capsule by mouth Daily.       No current facility-administered medications on file prior to visit.         Assessment:       1. Physical exam, annual    2. Hemiplegia affecting right dominant side, unspecified etiology, unspecified  hemiplegia type    3. Morbid (severe) obesity due to excess calories        Plan:       Physical exam, annual   - annual screening labs discussed; health maintenance otherwise up-to-date    - code status discussion had; LA post documentation provided/that the patient (and her medical power-of-) can discuss its contents returned for formal completion     Hemiplegia affecting right dominant side, unspecified etiology, unspecified hemiplegia type   - distant/historical; has full mobility with minimal ADLs effect     Morbid (severe) obesity due to excess calories   - no appreciable effect on systemic organ function or cardiovascular risk; no intervention is warranted

## 2022-12-13 ENCOUNTER — OFFICE VISIT (OUTPATIENT)
Dept: INTERNAL MEDICINE | Facility: CLINIC | Age: 87
End: 2022-12-13
Payer: MEDICARE

## 2022-12-13 VITALS
RESPIRATION RATE: 15 BRPM | HEART RATE: 88 BPM | SYSTOLIC BLOOD PRESSURE: 138 MMHG | OXYGEN SATURATION: 96 % | TEMPERATURE: 98 F | WEIGHT: 171.94 LBS | BODY MASS INDEX: 30.46 KG/M2 | DIASTOLIC BLOOD PRESSURE: 84 MMHG

## 2022-12-13 DIAGNOSIS — E66.01 MORBID (SEVERE) OBESITY DUE TO EXCESS CALORIES: ICD-10-CM

## 2022-12-13 DIAGNOSIS — Z00.00 PHYSICAL EXAM, ANNUAL: Primary | ICD-10-CM

## 2022-12-13 DIAGNOSIS — G81.91 HEMIPLEGIA AFFECTING RIGHT DOMINANT SIDE, UNSPECIFIED ETIOLOGY, UNSPECIFIED HEMIPLEGIA TYPE: ICD-10-CM

## 2022-12-13 PROCEDURE — 99214 OFFICE O/P EST MOD 30 MIN: CPT | Mod: S$PBB,,, | Performed by: STUDENT IN AN ORGANIZED HEALTH CARE EDUCATION/TRAINING PROGRAM

## 2022-12-13 PROCEDURE — 99999 PR PBB SHADOW E&M-EST. PATIENT-LVL III: CPT | Mod: PBBFAC,,, | Performed by: STUDENT IN AN ORGANIZED HEALTH CARE EDUCATION/TRAINING PROGRAM

## 2022-12-13 PROCEDURE — 99213 OFFICE O/P EST LOW 20 MIN: CPT | Mod: PBBFAC,PO | Performed by: STUDENT IN AN ORGANIZED HEALTH CARE EDUCATION/TRAINING PROGRAM

## 2022-12-13 PROCEDURE — 99999 PR PBB SHADOW E&M-EST. PATIENT-LVL III: ICD-10-PCS | Mod: PBBFAC,,, | Performed by: STUDENT IN AN ORGANIZED HEALTH CARE EDUCATION/TRAINING PROGRAM

## 2022-12-13 PROCEDURE — 99214 PR OFFICE/OUTPT VISIT, EST, LEVL IV, 30-39 MIN: ICD-10-PCS | Mod: S$PBB,,, | Performed by: STUDENT IN AN ORGANIZED HEALTH CARE EDUCATION/TRAINING PROGRAM

## 2022-12-30 ENCOUNTER — TELEPHONE (OUTPATIENT)
Dept: UROLOGY | Facility: CLINIC | Age: 87
End: 2022-12-30
Payer: MEDICARE

## 2022-12-30 NOTE — TELEPHONE ENCOUNTER
----- Message from Brooklyn Russell LPN sent at 12/30/2022 12:36 PM CST -----  Pt scheduled her own nnamdi;t  today with dr. Haley on Tuesday , can you please move , she prob needs dr. Caldera, but she may also need referral  first as the comment states frequency and I see she is on lasix. Thanks

## 2023-01-03 ENCOUNTER — CLINICAL SUPPORT (OUTPATIENT)
Dept: UROGYNECOLOGY | Facility: CLINIC | Age: 88
End: 2023-01-03
Payer: MEDICARE

## 2023-01-03 NOTE — PROGRESS NOTES
Established Patient - Audio Only Telehealth Visit     The patient location is: home  The chief complaint leading to consultation is: OAB  Visit type: Virtual visit with audio only (telephone)  Total time spent with patient: 7 min     Damari Grant complains of excessive urination/nocturia. She denies incontinence during the day. She has previously used a Purewick while inpatient and is interested in ordering this.    Nocturia not during day    Interested in purewick    Referring Provider:  Self, Aaareferral  Pelvic Health Screening:     Do you...   Usually experience frequent urination? yes  Usually leak urine with a feeling of urgency or strong sensation of needing to go to the bathroom? no  Usually leak urine with coughing, sneezing, laughing or exercise or exertion? no  Usually experience small amounts or drops of urine leakage? no    Have a bulge or something falling out that you can see or feel in your vaginal area or rectum? no  Have to push on the vagina or around the rectum to have complete a bowel movement? no  Usually push up on the bulge in the vaginal area with your fingers to start or complete urination? no    Usually lose stool beyond your control or have trouble with stool passing? no  Experience a strong sense of urgency and must rush to the bathroom to have a bowel movement? no    Patient identifies nocturia/nocturnal enuresis as most bothersome.    Background:  Relevant History:  OB:  Hysterect    Other:  AVR  CAD     CABG    Previous Treatment:  None per patient    Patient educated on pelvic health, including normal and abnormal bladder function and directed to the pelvic health website and [employer] resources for additional information. Appt scheduled and confirmed.

## 2023-01-10 ENCOUNTER — OFFICE VISIT (OUTPATIENT)
Dept: CARDIOLOGY | Facility: CLINIC | Age: 88
End: 2023-01-10
Payer: MEDICARE

## 2023-01-10 VITALS
DIASTOLIC BLOOD PRESSURE: 87 MMHG | WEIGHT: 176.69 LBS | HEIGHT: 63 IN | SYSTOLIC BLOOD PRESSURE: 149 MMHG | BODY MASS INDEX: 31.31 KG/M2 | HEART RATE: 75 BPM

## 2023-01-10 DIAGNOSIS — I70.0 ATHEROSCLEROSIS OF AORTA: Chronic | ICD-10-CM

## 2023-01-10 DIAGNOSIS — I65.23 BILATERAL CAROTID ARTERY STENOSIS: Chronic | ICD-10-CM

## 2023-01-10 DIAGNOSIS — I10 ESSENTIAL HYPERTENSION: Chronic | ICD-10-CM

## 2023-01-10 DIAGNOSIS — Z95.2 S/P TAVR (TRANSCATHETER AORTIC VALVE REPLACEMENT): Chronic | ICD-10-CM

## 2023-01-10 DIAGNOSIS — I50.32 CHRONIC DIASTOLIC HEART FAILURE: Primary | Chronic | ICD-10-CM

## 2023-01-10 DIAGNOSIS — Z95.1 HISTORY OF CORONARY ARTERY BYPASS GRAFT: Chronic | ICD-10-CM

## 2023-01-10 DIAGNOSIS — I25.10 ARTERIOSCLEROSIS OF CORONARY ARTERY: Chronic | ICD-10-CM

## 2023-01-10 DIAGNOSIS — I48.20 CHRONIC ATRIAL FIBRILLATION: Chronic | ICD-10-CM

## 2023-01-10 DIAGNOSIS — E78.00 PURE HYPERCHOLESTEROLEMIA: Chronic | ICD-10-CM

## 2023-01-10 PROCEDURE — 99999 PR PBB SHADOW E&M-EST. PATIENT-LVL III: CPT | Mod: PBBFAC,,, | Performed by: INTERNAL MEDICINE

## 2023-01-10 PROCEDURE — 99213 OFFICE O/P EST LOW 20 MIN: CPT | Mod: PBBFAC,PO | Performed by: INTERNAL MEDICINE

## 2023-01-10 PROCEDURE — 99214 PR OFFICE/OUTPT VISIT, EST, LEVL IV, 30-39 MIN: ICD-10-PCS | Mod: S$PBB,,, | Performed by: INTERNAL MEDICINE

## 2023-01-10 PROCEDURE — 99999 PR PBB SHADOW E&M-EST. PATIENT-LVL III: ICD-10-PCS | Mod: PBBFAC,,, | Performed by: INTERNAL MEDICINE

## 2023-01-10 PROCEDURE — 99214 OFFICE O/P EST MOD 30 MIN: CPT | Mod: S$PBB,,, | Performed by: INTERNAL MEDICINE

## 2023-01-10 RX ORDER — FUROSEMIDE 20 MG/1
20 TABLET ORAL
Qty: 30 TABLET | Refills: 11 | Status: SHIPPED | OUTPATIENT
Start: 2023-01-10 | End: 2024-01-10

## 2023-01-18 ENCOUNTER — OFFICE VISIT (OUTPATIENT)
Dept: UROLOGY | Facility: CLINIC | Age: 88
End: 2023-01-18
Payer: MEDICARE

## 2023-01-18 VITALS
HEIGHT: 65 IN | DIASTOLIC BLOOD PRESSURE: 86 MMHG | BODY MASS INDEX: 28.28 KG/M2 | SYSTOLIC BLOOD PRESSURE: 164 MMHG | HEART RATE: 77 BPM | WEIGHT: 169.75 LBS

## 2023-01-18 DIAGNOSIS — R35.1 NOCTURIA: Primary | ICD-10-CM

## 2023-01-18 DIAGNOSIS — N81.11 MIDLINE CYSTOCELE: ICD-10-CM

## 2023-01-18 DIAGNOSIS — N95.2 VAGINAL ATROPHY: ICD-10-CM

## 2023-01-18 PROCEDURE — 99213 OFFICE O/P EST LOW 20 MIN: CPT | Mod: PBBFAC | Performed by: UROLOGY

## 2023-01-18 PROCEDURE — 99999 PR PBB SHADOW E&M-EST. PATIENT-LVL III: ICD-10-PCS | Mod: PBBFAC,,, | Performed by: UROLOGY

## 2023-01-18 PROCEDURE — 99999 PR PBB SHADOW E&M-EST. PATIENT-LVL III: CPT | Mod: PBBFAC,,, | Performed by: UROLOGY

## 2023-01-18 PROCEDURE — 99205 OFFICE O/P NEW HI 60 MIN: CPT | Mod: S$PBB,,, | Performed by: UROLOGY

## 2023-01-18 PROCEDURE — 99205 PR OFFICE/OUTPT VISIT, NEW, LEVL V, 60-74 MIN: ICD-10-PCS | Mod: S$PBB,,, | Performed by: UROLOGY

## 2023-01-18 NOTE — PROGRESS NOTES
CHIEF COMPLAINT:    Mrs. Grant is a 89 y.o. female presenting as a self referred patient for nocturia.      PRESENTING ILLNESS:    Damari Grant is a 89 y.o. female who is presents with a history of nocturia.  She wears a pull up (the [employer] night defense) and lines it with 2 pads and sometimes the entire combination is soaked and wets the sheets.  During the day, she voids very little, maybe about 3 times and she gurrola snot have the problem at night.  She was hospitalized in the ICU after Aortic valve replacement and they had a Purewick on her so she became used to voiding into the Purewick.  Rather than getting up to the bedside commode, she goes into the diaper. She lives at home and her daughter lives next door.  They considered getting a Purewick system at home but was told the out of pocket cost would be $500 monthly.   She is interested in knowing if anything can be done.  She has no skin breakdown but does have some itching.  She was prescribed clotrimazole with triamcinolone for the external skin and Estrace cream for vaginal use by her OBGYN.  No gross hematuria or recurrent UTI's.     , hysterectomy for bleeding, not sexually active, ( 3 years ago), bowels are normal, she manages them with daily Miralax and Senokot at night.     REVIEW OF SYSTEMS:    Review of Systems   Constitutional: Negative.    HENT: Negative.     Eyes: Negative.    Respiratory: Negative.     Cardiovascular: Negative.    Gastrointestinal:  Positive for constipation.   Genitourinary:         Nocturia   Musculoskeletal: Negative.    Skin: Negative.    Neurological: Negative.    Endo/Heme/Allergies: Negative.    Psychiatric/Behavioral: Negative.       PATIENT HISTORY:    Past Medical History:   Diagnosis Date    Syncope and collapse        Past Surgical History:   Procedure Laterality Date    ADENOIDECTOMY      AORTIC VALVE REPLACEMENT      APPENDECTOMY      CARDIAC VALVE REPLACEMENT      CARDIAC VALVE SURGERY      CORONARY  ARTERY BYPASS GRAFT      HYSTERECTOMY      TONSILLECTOMY         Family History   Problem Relation Age of Onset    Cancer Mother        Social History     Socioeconomic History    Marital status:    Tobacco Use    Smoking status: Never    Smokeless tobacco: Never   Substance and Sexual Activity    Alcohol use: Not Currently     Alcohol/week: 0.0 standard drinks    Drug use: No    Sexual activity: Not Currently     Partners: Male       Allergies:  Codeine, Crestor [rosuvastatin], Fosamax [alendronate], Livalo [pitavastatin], and Simvastatin    Medications:  Outpatient Encounter Medications as of 1/18/2023   Medication Sig Dispense Refill    acetaminophen (TYLENOL) 500 MG tablet Take 500 mg by mouth as needed.      diclofenac sodium (VOLTAREN) 1 % Gel Apply 2 g topically 3 (three) times daily. 200 g 1    ELIQUIS 5 mg Tab TAKE ONE BY MOUTH TWICE DAILY 60 tablet 11    estradioL (ESTRACE) 0.01 % (0.1 mg/gram) vaginal cream 1 g once daily for 1 week then as needed thereafter 30 g 11    furosemide (LASIX) 20 MG tablet Take 1 tablet (20 mg total) by mouth as needed (Swelling). 30 tablet 11    levothyroxine (SYNTHROID) 25 MCG tablet TAKE ONE DAILY BEFORE BREAKFAST 90 tablet 0    metoprolol succinate (TOPROL-XL) 50 MG 24 hr tablet TAKE (1/2) HALF BY MOUTH DAILY 45 tablet 3    nystatin-triamcinolone (MYCOLOG II) cream Apply topically 2 (two) times daily as needed. Apply to affected area as needed 60 g 5    REPATHA SURECLICK 140 mg/mL PnIj INJECT 1 ML (140 MG TOTAL) INTO THE SKIN EVERY 14 (FOURTEEN) DAYS. 6 mL 3    spironolactone (ALDACTONE) 25 MG tablet Take 0.5 tablets (12.5 mg total) by mouth 2 (two) times daily with meals. (Patient taking differently: Take 25 mg by mouth once daily. Takes 0.5 mg two times a day) 90 tablet 3    VITAMIN D2 1,250 mcg (50,000 unit) capsule TAKE 1 CAPSULE (50,000 UNITS TOTAL) BY MOUTH EVERY 7 DAYS. 12 capsule 5    vitamin K2 100 mcg Cap Take 1 capsule by mouth Daily.         PHYSICAL  EXAMINATION:    The patient generally appears in good health, is appropriately interactive, and is in no apparent distress.    Skin: No lesions.    Mental: Cooperative with normal affect.    Neuro: Grossly intact.    HEENT: Normal. No evidence of lymphadenopathy.    Chest:  normal inspiratory effort.    Abdomen: Soft, non-tender. No masses or organomegaly. Bladder is not palpable. No evidence of flank discomfort. No evidence of inguinal hernia.    Extremities: No clubbing, cyanosis, or edema    Normal external female genitalia, skin is hyperpigmented consistent with chronic irritation but there is no swelling.   Urethral meatus is normal  Urethra and bladder are nontender to bimanual exam  Stage II cystocele  Well supported posteriorly   Uterus and cervix are surgically absent  No adnexal masses    Aa 0, Ba 0, C -5  gH 4, pB 2, TVL 7  Ap -3, Bp -3, D n/a    LABS:    Lab Results   Component Value Date    BUN 12 12/09/2022    CREATININE 0.8 12/09/2022       UA 1.020, pH 5, otherwise, negative.     IMPRESSION:    Nocturia  Cystocele  Vaginal atrophy    PLAN:    1.  Discussed the 3 day intake output diary.  Could determine the output but weighing a diaper and pads dry, then weighing the combination in the morning.  She can measure the day time volumes.  Only need 3 24 hour periods.    2.  Follow up in 1 month.   3.  Switch the clotrimazole and triamcinolone to diaper rash ointment.  Explained that the triamcinolone is a steroid that can thin the skin.  It can be reserved if she has a rash.  Continue using the Estrace cream.     I spent 60 minutes with the patient of which more than half was spent in direct consultation with the patient in regards to our treatment and plan.

## 2023-02-03 ENCOUNTER — PES CALL (OUTPATIENT)
Dept: ADMINISTRATIVE | Facility: CLINIC | Age: 88
End: 2023-02-03
Payer: MEDICARE

## 2023-02-23 ENCOUNTER — PATIENT MESSAGE (OUTPATIENT)
Dept: INTERNAL MEDICINE | Facility: CLINIC | Age: 88
End: 2023-02-23
Payer: MEDICARE

## 2023-02-23 ENCOUNTER — OFFICE VISIT (OUTPATIENT)
Dept: URGENT CARE | Facility: CLINIC | Age: 88
End: 2023-02-23
Payer: MEDICARE

## 2023-02-23 VITALS
SYSTOLIC BLOOD PRESSURE: 141 MMHG | HEIGHT: 66 IN | DIASTOLIC BLOOD PRESSURE: 91 MMHG | OXYGEN SATURATION: 95 % | RESPIRATION RATE: 20 BRPM | WEIGHT: 169 LBS | BODY MASS INDEX: 27.16 KG/M2 | HEART RATE: 99 BPM | TEMPERATURE: 99 F

## 2023-02-23 DIAGNOSIS — J20.8 VIRAL BRONCHITIS: ICD-10-CM

## 2023-02-23 DIAGNOSIS — R05.9 COUGH, UNSPECIFIED TYPE: Primary | ICD-10-CM

## 2023-02-23 LAB
CTP QC/QA: YES
CTP QC/QA: YES
POC MOLECULAR INFLUENZA A AGN: NEGATIVE
POC MOLECULAR INFLUENZA B AGN: NEGATIVE
SARS-COV-2 AG RESP QL IA.RAPID: NEGATIVE

## 2023-02-23 PROCEDURE — 87502 POCT INFLUENZA A/B MOLECULAR: ICD-10-PCS | Mod: QW,S$GLB,, | Performed by: INTERNAL MEDICINE

## 2023-02-23 PROCEDURE — 94640 AIRWAY INHALATION TREATMENT: CPT | Mod: S$GLB,,, | Performed by: INTERNAL MEDICINE

## 2023-02-23 PROCEDURE — 99214 PR OFFICE/OUTPT VISIT, EST, LEVL IV, 30-39 MIN: ICD-10-PCS | Mod: 25,S$GLB,, | Performed by: INTERNAL MEDICINE

## 2023-02-23 PROCEDURE — 99214 OFFICE O/P EST MOD 30 MIN: CPT | Mod: 25,S$GLB,, | Performed by: INTERNAL MEDICINE

## 2023-02-23 PROCEDURE — 87502 INFLUENZA DNA AMP PROBE: CPT | Mod: QW,S$GLB,, | Performed by: INTERNAL MEDICINE

## 2023-02-23 PROCEDURE — 94640 PR INHAL RX, AIRWAY OBST/DX SPUTUM INDUCT: ICD-10-PCS | Mod: S$GLB,,, | Performed by: INTERNAL MEDICINE

## 2023-02-23 PROCEDURE — 71046 XR CHEST PA AND LATERAL: ICD-10-PCS | Mod: FY,S$GLB,, | Performed by: RADIOLOGY

## 2023-02-23 PROCEDURE — 87811 SARS CORONAVIRUS 2 ANTIGEN POCT, MANUAL READ: ICD-10-PCS | Mod: QW,CR,S$GLB, | Performed by: INTERNAL MEDICINE

## 2023-02-23 PROCEDURE — 87811 SARS-COV-2 COVID19 W/OPTIC: CPT | Mod: QW,CR,S$GLB, | Performed by: INTERNAL MEDICINE

## 2023-02-23 PROCEDURE — 71046 X-RAY EXAM CHEST 2 VIEWS: CPT | Mod: FY,S$GLB,, | Performed by: RADIOLOGY

## 2023-02-23 RX ORDER — ALBUTEROL SULFATE 90 UG/1
2 AEROSOL, METERED RESPIRATORY (INHALATION) EVERY 6 HOURS PRN
Qty: 8 G | Refills: 0 | Status: SHIPPED | OUTPATIENT
Start: 2023-02-23 | End: 2023-07-10

## 2023-02-23 RX ORDER — LEVALBUTEROL INHALATION SOLUTION 0.63 MG/3ML
0.63 SOLUTION RESPIRATORY (INHALATION)
Status: COMPLETED | OUTPATIENT
Start: 2023-02-23 | End: 2023-02-23

## 2023-02-23 RX ORDER — BENZONATATE 100 MG/1
100 CAPSULE ORAL 3 TIMES DAILY PRN
Qty: 30 CAPSULE | Refills: 0 | Status: SHIPPED | OUTPATIENT
Start: 2023-02-23 | End: 2023-03-05

## 2023-02-23 RX ORDER — LEVALBUTEROL INHALATION SOLUTION 1.25 MG/3ML
1.25 SOLUTION RESPIRATORY (INHALATION)
Status: DISCONTINUED | OUTPATIENT
Start: 2023-02-23 | End: 2023-02-23

## 2023-02-23 RX ADMIN — LEVALBUTEROL INHALATION SOLUTION 0.63 MG: 0.63 SOLUTION RESPIRATORY (INHALATION) at 02:02

## 2023-02-23 NOTE — PROGRESS NOTES
"Subjective:       Patient ID: Damari Grant is a 89 y.o. female.    Vitals:  height is 5' 6" (1.676 m) and weight is 76.7 kg (169 lb). Her temperature is 98.7 °F (37.1 °C). Her blood pressure is 141/91 (abnormal) and her pulse is 99. Her respiration is 20 and oxygen saturation is 95%.     Chief Complaint: Cough    This is a 89 y.o. female who presents today with a chief complaint of dry cough, chest tightness, shortness of breath, fatigue, and upper back pain x 3 days ago. Pt took some cough syrup.       Cough  This is a new problem. The cough is Non-productive. Associated symptoms include shortness of breath. Associated symptoms comments: Chest tightness. Treatments tried: cough syrup. Her past medical history is significant for pneumonia.     Constitution: Positive for fatigue.   Respiratory:  Positive for cough and shortness of breath.    Musculoskeletal:  Positive for back pain.   Skin:  Negative for erythema.     Objective:      Physical Exam   Constitutional: She is oriented to person, place, and time. She appears well-developed.  Non-toxic appearance. She does not appear ill. No distress.   HENT:   Head: Normocephalic and atraumatic.   Ears:   Right Ear: External ear normal.   Left Ear: External ear normal.   Nose: Nose normal.   Mouth/Throat: Oropharynx is clear and moist. Mucous membranes are moist. No oropharyngeal exudate. Oropharynx is clear.   Eyes: Conjunctivae and EOM are normal. Pupils are equal, round, and reactive to light. Right eye exhibits no discharge. Left eye exhibits no discharge. No scleral icterus.   Neck: Neck supple.   Cardiovascular: Normal rate, regular rhythm and normal heart sounds.   No murmur heard.Exam reveals no gallop and no friction rub.   Pulmonary/Chest: Effort normal. No respiratory distress. She has wheezes (scattered, improved with nebs). She has no rhonchi. She has no rales.         Comments: No accessory muscle use.     Abdominal: Bowel sounds are normal. She exhibits " no distension. Soft.   Musculoskeletal:      Right lower leg: No edema.      Left lower leg: No edema.   Lymphadenopathy:     She has no cervical adenopathy.   Neurological: She is alert and oriented to person, place, and time.   Skin: Skin is warm, dry, not diaphoretic and no rash. Capillary refill takes less than 2 seconds. No erythema   Psychiatric: Her behavior is normal.   Nursing note and vitals reviewed.        XR CHEST PA AND LATERAL    Result Date: 2/23/2023  EXAMINATION: XR CHEST PA AND LATERAL CLINICAL HISTORY: Cough, unspecified TECHNIQUE: PA and lateral views of the chest were performed. COMPARISON: N 03/18/2022 one FINDINGS: Postoperative changes and aortic valve replacement identified as before.  Heart size upper limit of normal.  Mild accentuation of the basilar interstitial markings.  No significant airspace consolidation or pleural effusion identified     See above Electronically signed by: River Stearns MD Date:    02/23/2023 Time:    14:52     Assessment:       1. Cough, unspecified type    2. Viral bronchitis          Plan:         Cough, unspecified type  -     SARS Coronavirus 2 Antigen, POCT Manual Read  -     POCT Influenza A/B MOLECULAR  -     XR CHEST PA AND LATERAL; Future; Expected date: 02/23/2023  -     Discontinue: levalbuterol nebulizer solution 1.25 mg  -     levalbuterol nebulizer solution 0.63 mg    Viral bronchitis  -     albuterol (VENTOLIN HFA) 90 mcg/actuation inhaler; Inhale 2 puffs into the lungs every 6 (six) hours as needed for Wheezing. Rescue  Dispense: 8 g; Refill: 0  -     benzonatate (TESSALON) 100 MG capsule; Take 1 capsule (100 mg total) by mouth 3 (three) times daily as needed for Cough.  Dispense: 30 capsule; Refill: 0

## 2023-02-25 ENCOUNTER — NURSE TRIAGE (OUTPATIENT)
Dept: ADMINISTRATIVE | Facility: CLINIC | Age: 88
End: 2023-02-25
Payer: MEDICARE

## 2023-02-25 NOTE — TELEPHONE ENCOUNTER
[place]    PCP:  Dr. Milo Hogan    Spoke with Dtr, Sujata Conte, on pts behalf.  Dtr states that pt had an Albuterol MDI ordered on Thursday but pt is not able to take the med by MDI and needs an aerosol instead.  C/O nasal congestion.  Per protocol, care advised is see PCP within 3 days.  Dtr first accepted a VV with PCP but then cancelled prior to completing scheduling d/t unable to complete e-pre check.  No available in office appts with PCP/Dept.  OCA/RR offered and declined.  Dtr states will go back to C or ED.  Advised Dtr that for MDI to be changed to HHN treatments her PCP will have to place an order and also get a nebulizer machine ordered via DME therefore that won't be able to be completed prior to Monday.  Advised Dtr that if pt has difficulty breathing that she will need to take the pt to the ED.  Advised to call for worsening/questions/concerns.  VU.  Message routed high priority to PCP.    Reason for Disposition   Lots of coughing    Additional Information   Negative: SEVERE difficulty breathing (e.g., struggling for each breath, speaks in single words)   Negative: Sounds like a life-threatening emergency to the triager   Negative: [1] Difficulty breathing AND [2] not from stuffy nose (e.g., not relieved by cleaning out the nose)   Negative: [1] SEVERE headache AND [2] fever   Negative: [1] Redness or swelling on the cheek, forehead or around the eye AND [2] fever   Negative: Fever > 104 F (40 C)   Negative: Patient sounds very sick or weak to the triager   Negative: [1] SEVERE pain AND [2] not improved 2 hours after pain medicine   Negative: [1] Redness or swelling on the cheek, forehead or around the eye AND [2] no fever   Negative: [1] Fever > 101 F (38.3 C) AND [2] age > 60 years   Negative: [1] Fever > 100.0 F (37.8 C) AND [2] bedridden (e.g., nursing home patient, CVA, chronic illness, recovering from surgery)   Negative: [1] Fever > 100.0 F (37.8 C) AND [2] diabetes mellitus or weak immune  system (e.g., HIV positive, cancer chemo, splenectomy, organ transplant, chronic steroids)   Negative: Fever present > 3 days (72 hours)   Negative: [1] Fever returns after gone for over 24 hours AND [2] symptoms worse or not improved   Negative: [1] Sinus pain (not just congestion) AND [2] fever   Negative: Earache   Negative: [1] Sinus congestion (pressure, fullness) AND [2] present > 10 days   Negative: [1] Nasal discharge AND [2] present > 10 days   Negative: [1] Using nasal washes and pain medicine > 24 hours AND [2] sinus pain (around cheekbone or eye) persists    Protocols used: Sinus Pain or Congestion-A-AH

## 2023-02-27 NOTE — TELEPHONE ENCOUNTER
I spoke to pt's daughter.  Pt is doing ok other than coughing spells.  She will continue taking Tessalon prn. She was advised to try teaspoon of honey.  Sujata verbalized understanding

## 2023-03-01 ENCOUNTER — OFFICE VISIT (OUTPATIENT)
Dept: URGENT CARE | Facility: CLINIC | Age: 88
End: 2023-03-01
Payer: MEDICARE

## 2023-03-01 VITALS
RESPIRATION RATE: 32 BRPM | DIASTOLIC BLOOD PRESSURE: 87 MMHG | SYSTOLIC BLOOD PRESSURE: 173 MMHG | TEMPERATURE: 98 F | HEART RATE: 105 BPM | OXYGEN SATURATION: 92 % | HEIGHT: 66 IN | WEIGHT: 169 LBS | BODY MASS INDEX: 27.16 KG/M2

## 2023-03-01 DIAGNOSIS — J20.8 ACUTE VIRAL BRONCHITIS: Primary | ICD-10-CM

## 2023-03-01 DIAGNOSIS — R05.9 COUGH, UNSPECIFIED TYPE: ICD-10-CM

## 2023-03-01 DIAGNOSIS — R06.2 WHEEZING: ICD-10-CM

## 2023-03-01 PROCEDURE — 99214 PR OFFICE/OUTPT VISIT, EST, LEVL IV, 30-39 MIN: ICD-10-PCS | Mod: S$GLB,,, | Performed by: NURSE PRACTITIONER

## 2023-03-01 PROCEDURE — 71046 X-RAY EXAM CHEST 2 VIEWS: CPT | Mod: FY,S$GLB,, | Performed by: RADIOLOGY

## 2023-03-01 PROCEDURE — 71046 XR CHEST PA AND LATERAL: ICD-10-PCS | Mod: FY,S$GLB,, | Performed by: RADIOLOGY

## 2023-03-01 PROCEDURE — 99214 OFFICE O/P EST MOD 30 MIN: CPT | Mod: S$GLB,,, | Performed by: NURSE PRACTITIONER

## 2023-03-01 RX ORDER — IPRATROPIUM BROMIDE 0.5 MG/2.5ML
0.5 SOLUTION RESPIRATORY (INHALATION)
Status: COMPLETED | OUTPATIENT
Start: 2023-03-01 | End: 2023-03-01

## 2023-03-01 RX ORDER — ALBUTEROL SULFATE 0.83 MG/ML
2.5 SOLUTION RESPIRATORY (INHALATION)
Status: DISCONTINUED | OUTPATIENT
Start: 2023-03-01 | End: 2023-03-01

## 2023-03-01 RX ORDER — ALBUTEROL SULFATE 0.83 MG/ML
2.5 SOLUTION RESPIRATORY (INHALATION) EVERY 6 HOURS PRN
Qty: 30 EACH | Refills: 0 | Status: SHIPPED | OUTPATIENT
Start: 2023-03-01 | End: 2023-03-07 | Stop reason: ALTCHOICE

## 2023-03-01 RX ADMIN — IPRATROPIUM BROMIDE 0.5 MG: 0.5 SOLUTION RESPIRATORY (INHALATION) at 06:03

## 2023-03-01 NOTE — PROGRESS NOTES
"Subjective:       Patient ID: Damari Grant is a 89 y.o. female.    Vitals:  height is 5' 6" (1.676 m) and weight is 76.7 kg (169 lb). Her oral temperature is 97.8 °F (36.6 °C). Her blood pressure is 173/87 (abnormal) and her pulse is 105. Her respiration is 32 (abnormal) and oxygen saturation is 92% (abnormal).     Chief Complaint: Cough    Pt is a 89 yo female w/ c/o cough, wheezing, SOB for 10 days. Pt was seen at  and treated w/ albuterol and tessalon. She followed up w/ her ENT 3 days ago who put her on a zpack and gave her 2 shots. Symptoms continued to worsen over the last 2 days. Pt was prescribed an inhaler, but was unable to tolerate at home.     Cough  This is a new problem. The current episode started 1 to 4 weeks ago. The problem has been rapidly worsening (worsening as of yesterday). The problem occurs constantly. The cough is Non-productive. Associated symptoms include wheezing. Pertinent negatives include no chest pain (chest tightness), chills, ear congestion, ear pain, fever, headaches, heartburn, hemoptysis, myalgias, nasal congestion, postnasal drip, rash, rhinorrhea, sore throat, shortness of breath, sweats or weight loss. Associated symptoms comments: Positive for: mid-back pain, fatigue, difficulty sleeping at night. Treatments tried: albuterol, tessalon, on day 2 of Z-harsh from ENT theodore Neri, 2 injections at ENT (steroid and antihistamine or antibiotic) The treatment provided no relief. There is no history of asthma, bronchiectasis, bronchitis, COPD, emphysema, environmental allergies or pneumonia. bronchiolitis obliterans with organzing pneumonia, pulmonary hypertension     Constitution: Negative for chills and fever.   HENT:  Negative for ear pain, postnasal drip and sore throat.    Cardiovascular:  Negative for chest pain (chest tightness).   Respiratory:  Positive for cough and wheezing. Negative for bloody sputum and shortness of breath.    Gastrointestinal:  Negative " for heartburn.   Musculoskeletal:  Negative for muscle ache.   Skin:  Negative for rash.   Allergic/Immunologic: Negative for environmental allergies.   Neurological:  Negative for headaches.     Objective:      Physical Exam   Constitutional: She is oriented to person, place, and time. She appears well-developed. She is cooperative.  Non-toxic appearance. She does not appear ill. No distress.   HENT:   Head: Normocephalic and atraumatic.   Ears:   Right Ear: Hearing, tympanic membrane, external ear and ear canal normal.   Left Ear: Hearing, tympanic membrane, external ear and ear canal normal.   Nose: Nose normal. No mucosal edema, rhinorrhea or nasal deformity. No epistaxis. Right sinus exhibits no maxillary sinus tenderness and no frontal sinus tenderness. Left sinus exhibits no maxillary sinus tenderness and no frontal sinus tenderness.   Mouth/Throat: Uvula is midline, oropharynx is clear and moist and mucous membranes are normal. No trismus in the jaw. Normal dentition. No uvula swelling. No oropharyngeal exudate, posterior oropharyngeal edema or posterior oropharyngeal erythema.   Eyes: Conjunctivae and lids are normal. No scleral icterus.   Neck: Trachea normal and phonation normal. Neck supple. No edema present. No erythema present. No neck rigidity present.   Cardiovascular: Normal rate, regular rhythm, normal heart sounds and normal pulses.   Pulmonary/Chest: Effort normal. No respiratory distress. She has no decreased breath sounds. She has wheezes in the right upper field, the right middle field, the right lower field, the left upper field and the left lower field. She has no rhonchi. She has no rales.   cough         Comments: cough    Abdominal: Normal appearance.   Musculoskeletal: Normal range of motion.         General: No deformity. Normal range of motion.   Neurological: She is alert and oriented to person, place, and time. She exhibits normal muscle tone. Coordination normal.   Skin: Skin is  warm, dry, intact, not diaphoretic and not pale.   Psychiatric: Her speech is normal and behavior is normal. Judgment and thought content normal.   Nursing note and vitals reviewed.    Pt reports significant improvement after treatments    XR CHEST PA AND LATERAL    Result Date: 3/1/2023  EXAMINATION: XR CHEST PA AND LATERAL CLINICAL HISTORY: Wheezing TECHNIQUE: PA and lateral views of the chest were performed. COMPARISON: 02/23/2023. FINDINGS: There are postoperative changes of median sternotomy.  The sternal wires are intact.  There also changes of prior cardiac valve replacement. The trachea is unremarkable.  There are calcifications of the aortic knob.  The cardiomediastinal silhouette is unremarkable.  There are no pleural effusions.  There is stable appearance of biapical pleural thickening.  There is no evidence of a pneumothorax.  There is no evidence of pneumomediastinum.  No airspace opacity is present.  There are degenerative changes in the osseous structures.     No acute intrathoracic process. Electronically signed by: Mekhi Be MD Date:    03/01/2023 Time:    18:45    Assessment:       1. Acute viral bronchitis    2. Wheezing    3. Cough, unspecified type          Plan:         Acute viral bronchitis    Wheezing  -     Discontinue: albuterol nebulizer solution 2.5 mg  -     ipratropium 0.02 % nebulizer solution 0.5 mg  -     XR CHEST PA AND LATERAL; Future; Expected date: 03/01/2023  -     ipratropium 0.02 % nebulizer solution 0.5 mg  -     albuterol (PROVENTIL) 2.5 mg /3 mL (0.083 %) nebulizer solution; Take 3 mLs (2.5 mg total) by nebulization every 6 (six) hours as needed for Wheezing or Shortness of Breath. Rescue  Dispense: 30 each; Refill: 0    Cough, unspecified type         Patient Instructions   - You must understand that you have received an Urgent Care treatment only and that you may be released before all of your medical problems are known or treated.   - You, the patient, will arrange  for follow up care as instructed.   - If your condition worsens or fails to improve we recommend that you receive another evaluation at the ER immediately or contact your PCP to discuss your concerns.   - You can call [phone] or [phone] to help schedule an appointment with the appropriate provider.    Drink plenty of fluids   Get lots of rest  Tylenol or ibuprofen for pain/fever  Mucinex DM for daytime cough  Prescription cough syrup for night time cough. This medication will make you drowsy. Do not drink alcohol or  Operate machinery while taking this medicine.   Saline nasal rinses to irrigate sinus cavities  Warm salt water gargles for sore throat  Monitor your oxygen levels at home. Go to the Emergency Department or call 911 if your levels are low and you are having trouble breathing

## 2023-03-02 ENCOUNTER — TELEPHONE (OUTPATIENT)
Dept: PULMONOLOGY | Facility: CLINIC | Age: 88
End: 2023-03-02
Payer: MEDICARE

## 2023-03-02 NOTE — PATIENT INSTRUCTIONS
- You must understand that you have received an Urgent Care treatment only and that you may be released before all of your medical problems are known or treated.   - You, the patient, will arrange for follow up care as instructed.   - If your condition worsens or fails to improve we recommend that you receive another evaluation at the ER immediately or contact your PCP to discuss your concerns.   - You can call [phone] or [phone] to help schedule an appointment with the appropriate provider.    Drink plenty of fluids   Get lots of rest  Tylenol or ibuprofen for pain/fever  Mucinex DM for daytime cough  Prescription cough syrup for night time cough. This medication will make you drowsy. Do not drink alcohol or  Operate machinery while taking this medicine.   Saline nasal rinses to irrigate sinus cavities  Warm salt water gargles for sore throat  Monitor your oxygen levels at home. Go to the Emergency Department or call [phone] if your levels are low and you are having trouble breathing

## 2023-03-02 NOTE — TELEPHONE ENCOUNTER
----- Message from Shana Bishop sent at 3/2/2023  7:31 AM CST -----  Regarding: Pt's Taniya Ernst called to request a work in appt today for an urgent care follow up for low oxygen levels  Same Day Appointment Access Request:    Pt's Taniya Ernst called to request a work in appt today for an urgent care follow up for low oxygen levels    Pt can be reached at [phone]

## 2023-03-02 NOTE — TELEPHONE ENCOUNTER
Sujata, Mrs Grant's daughter called to report that her oxygen saturation was 86% lying down this morning. When she sat up it was 93% and it has remained in the 90s. Sujata also said  Mrs Grant has been to [facility] twice in a week and was given nebulizer treatments, Zithromax and a injection.I told Sujata I will speak to Dr Cardenas and call her back with the next step. Maddie Masters LPN

## 2023-03-03 ENCOUNTER — TELEPHONE (OUTPATIENT)
Dept: PULMONOLOGY | Facility: CLINIC | Age: 88
End: 2023-03-03
Payer: MEDICARE

## 2023-03-03 ENCOUNTER — PATIENT MESSAGE (OUTPATIENT)
Dept: PULMONOLOGY | Facility: CLINIC | Age: 88
End: 2023-03-03
Payer: MEDICARE

## 2023-03-03 DIAGNOSIS — J84.89 BOOP (BRONCHIOLITIS OBLITERANS WITH ORGANIZING PNEUMONIA): Primary | ICD-10-CM

## 2023-03-03 NOTE — TELEPHONE ENCOUNTER
I spoke to Sujata, Mrs Grant's daughter about her oxygen saturations running in the low 90s. I told her Dr Cardenas said this was acceptable but if she was worried about it she can come in with a chest ray and see Dr Cardenas on Tuesday 3-7-23. Sujata was pleased with this appointment and said they will come. Maddie Masters LPN

## 2023-03-06 ENCOUNTER — PATIENT MESSAGE (OUTPATIENT)
Dept: PULMONOLOGY | Facility: CLINIC | Age: 88
End: 2023-03-06
Payer: MEDICARE

## 2023-03-07 ENCOUNTER — HOSPITAL ENCOUNTER (OUTPATIENT)
Dept: RADIOLOGY | Facility: HOSPITAL | Age: 88
Discharge: HOME | End: 2023-03-07
Attending: INTERNAL MEDICINE
Payer: MEDICARE

## 2023-03-07 ENCOUNTER — OFFICE VISIT (OUTPATIENT)
Dept: PULMONOLOGY | Facility: CLINIC | Age: 88
End: 2023-03-07
Payer: MEDICARE

## 2023-03-07 ENCOUNTER — TELEPHONE (OUTPATIENT)
Dept: PULMONOLOGY | Facility: CLINIC | Age: 88
End: 2023-03-07
Payer: MEDICARE

## 2023-03-07 VITALS
SYSTOLIC BLOOD PRESSURE: 122 MMHG | HEART RATE: 89 BPM | OXYGEN SATURATION: 94 % | BODY MASS INDEX: 28.16 KG/M2 | WEIGHT: 169 LBS | DIASTOLIC BLOOD PRESSURE: 74 MMHG | HEIGHT: 65 IN

## 2023-03-07 DIAGNOSIS — J44.1 COPD EXACERBATION: ICD-10-CM

## 2023-03-07 DIAGNOSIS — J84.89 BOOP (BRONCHIOLITIS OBLITERANS WITH ORGANIZING PNEUMONIA): ICD-10-CM

## 2023-03-07 DIAGNOSIS — R09.02 HYPOXEMIA: ICD-10-CM

## 2023-03-07 DIAGNOSIS — R05.3 CHRONIC COUGH: ICD-10-CM

## 2023-03-07 DIAGNOSIS — J45.909 ASTHMA, UNSPECIFIED ASTHMA SEVERITY, UNSPECIFIED WHETHER COMPLICATED, UNSPECIFIED WHETHER PERSISTENT: Primary | ICD-10-CM

## 2023-03-07 DIAGNOSIS — J84.89 BOOP (BRONCHIOLITIS OBLITERANS WITH ORGANIZING PNEUMONIA): Primary | ICD-10-CM

## 2023-03-07 PROCEDURE — 99999 PR PBB SHADOW E&M-EST. PATIENT-LVL III: ICD-10-PCS | Mod: PBBFAC,,, | Performed by: INTERNAL MEDICINE

## 2023-03-07 PROCEDURE — 71046 X-RAY EXAM CHEST 2 VIEWS: CPT | Mod: TC,FY

## 2023-03-07 PROCEDURE — 71046 X-RAY EXAM CHEST 2 VIEWS: CPT | Mod: 26,,, | Performed by: RADIOLOGY

## 2023-03-07 PROCEDURE — 99213 OFFICE O/P EST LOW 20 MIN: CPT | Mod: PBBFAC,25 | Performed by: INTERNAL MEDICINE

## 2023-03-07 PROCEDURE — 99214 PR OFFICE/OUTPT VISIT, EST, LEVL IV, 30-39 MIN: ICD-10-PCS | Mod: S$PBB,,, | Performed by: INTERNAL MEDICINE

## 2023-03-07 PROCEDURE — 71046 XR CHEST PA AND LATERAL: ICD-10-PCS | Mod: 26,,, | Performed by: RADIOLOGY

## 2023-03-07 PROCEDURE — 99999 PR PBB SHADOW E&M-EST. PATIENT-LVL III: CPT | Mod: PBBFAC,,, | Performed by: INTERNAL MEDICINE

## 2023-03-07 PROCEDURE — 99214 OFFICE O/P EST MOD 30 MIN: CPT | Mod: S$PBB,,, | Performed by: INTERNAL MEDICINE

## 2023-03-07 RX ORDER — LEVALBUTEROL INHALATION SOLUTION 1.25 MG/3ML
1 SOLUTION RESPIRATORY (INHALATION) EVERY 4 HOURS PRN
Qty: 90 EACH | Refills: 11 | Status: SHIPPED | OUTPATIENT
Start: 2023-03-07 | End: 2024-03-06

## 2023-03-08 ENCOUNTER — PATIENT MESSAGE (OUTPATIENT)
Dept: CARDIOLOGY | Facility: CLINIC | Age: 88
End: 2023-03-08
Payer: MEDICARE

## 2023-03-08 RX ORDER — AMOXICILLIN 500 MG/1
500 TABLET, FILM COATED ORAL
Qty: 4 TABLET | Refills: 2 | Status: SHIPPED | OUTPATIENT
Start: 2023-03-08 | End: 2023-04-18 | Stop reason: SDUPTHER

## 2023-03-08 RX ORDER — AMOXICILLIN 500 MG/1
500 TABLET, FILM COATED ORAL
COMMUNITY
End: 2023-03-08 | Stop reason: SDUPTHER

## 2023-03-13 NOTE — PROGRESS NOTES
Subjective:      Patient ID: Damari Grant is a 89 y.o. female.    Chief Complaint: Shortness of Breath    HPI89 yo matriarch of the [facility] comes for a assessment of a cough  I last saw her  March,2022. No significant exacerbations since that visit. She has thick mucus which she has trouble clearing.   She is alert and in no distress  Her Sa02: 95%   The family was concerrn that she was running Sa02 in the low 90's  probably an  error. Showed the daughter how to get a ore accurate Sad02.    She has a nebulizer at home and use albuterol. In light of her chronic atrial fibrillation I am switching to xopenex. She had a treatment in the office with good results.   Will also encourage more fluid intake  Her chest x-ray today Clear  there may be a slight increase in  bronchial markings in the right middle lobe.  No pneumonia or effusion. Sternal wires.   Unchanged from March 1st..     She is remarkable  Her labs drawn today are essentially normal  H&H slightly elevated  may be a bit dehydrated     ESR 17  CMP Normal    No indication for antibiotics and asked  family to use nebulizer tid.      No flowsheet data found.  Review of Systems   Constitutional: Negative.    HENT: Negative.     Eyes: Negative.    Respiratory:  Positive for cough.         Mild chronic bronchitis    Cardiovascular: Negative.         S/P TAVR   S/P CABG  Chronic Atrial Fibralliton    Hx of secondary pulmonary hypertension   Genitourinary: Negative.    Musculoskeletal: Negative.    Skin: Negative.    Gastrointestinal: Negative.    Neurological: Negative.         Hx  of TIA  had carotid stenting.   Psychiatric/Behavioral: Negative.     Objective:     Physical Exam  Vitals and nursing note reviewed.   Constitutional:       General: She is not in acute distress.     Appearance: She is well-developed.   HENT:      Head: Normocephalic and atraumatic.      Right Ear: External ear normal.      Left Ear: External ear normal.      Nose:  Nose normal.   Eyes:      Conjunctiva/sclera: Conjunctivae normal.      Pupils: Pupils are equal, round, and reactive to light.   Neck:      Thyroid: No thyromegaly.      Vascular: No JVD.   Cardiovascular:      Rate and Rhythm: Normal rate and regular rhythm.      Heart sounds: Normal heart sounds. No murmur heard.    No gallop.   Pulmonary:      Effort: No respiratory distress.      Breath sounds: Normal breath sounds. No stridor. No wheezing or rales.      Comments: Clear to A&P  without rales    Sa02: 95%    S/P Aortic vavlve replacement and later TAVR  Chest:      Chest wall: No tenderness.   Abdominal:      General: Bowel sounds are normal. There is no distension.      Palpations: Abdomen is soft. There is no mass.      Tenderness: There is no abdominal tenderness. There is no guarding or rebound.   Musculoskeletal:         General: Normal range of motion.      Cervical back: Normal range of motion and neck supple.      Right lower leg: No edema.      Left lower leg: No edema.   Lymphadenopathy:      Cervical: No cervical adenopathy.   Skin:     General: Skin is warm and dry.      Findings: No rash.   Neurological:      Mental Status: She is alert and oriented to person, place, and time.      Cranial Nerves: No cranial nerve deficit.      Deep Tendon Reflexes: Reflexes are normal and symmetric. Reflexes normal.   Psychiatric:         Behavior: Behavior normal.         Thought Content: Thought content normal.         Judgment: Judgment normal.       Assessment:     1. Asthma, unspecified asthma severity, unspecified whether complicated, unspecified whether persistent    2. COPD exacerbation    3. Chronic cough    4. Hypoxemia      Outpatient Encounter Medications as of 3/7/2023   Medication Sig Dispense Refill    acetaminophen (TYLENOL) 500 MG tablet Take 500 mg by mouth as needed.      albuterol (VENTOLIN HFA) 90 mcg/actuation inhaler Inhale 2 puffs into the lungs every 6 (six) hours as needed for Wheezing.  Rescue 8 g 0    [] benzonatate (TESSALON) 100 MG capsule Take 1 capsule (100 mg total) by mouth 3 (three) times daily as needed for Cough. 30 capsule 0    diclofenac sodium (VOLTAREN) 1 % Gel Apply 2 g topically 3 (three) times daily. (Patient not taking: Reported on 2023) 200 g 1    estradioL (ESTRACE) 0.01 % (0.1 mg/gram) vaginal cream 1 g once daily for 1 week then as needed thereafter (Patient not taking: Reported on 2023) 30 g 11    furosemide (LASIX) 20 MG tablet Take 1 tablet (20 mg total) by mouth as needed (Swelling). (Patient not taking: Reported on 2023) 30 tablet 11    levalbuterol (XOPENEX) 1.25 mg/3 mL nebulizer solution Take 3 mLs (1.25 mg total) by nebulization every 4 (four) hours as needed for Wheezing. Rescue 90 each 11    levothyroxine (SYNTHROID) 25 MCG tablet TAKE ONE DAILY BEFORE BREAKFAST 90 tablet 0    metoprolol succinate (TOPROL-XL) 50 MG 24 hr tablet TAKE (1/2) HALF BY MOUTH DAILY 45 tablet 3    nystatin-triamcinolone (MYCOLOG II) cream Apply topically 2 (two) times daily as needed. Apply to affected area as needed (Patient not taking: Reported on 2023) 60 g 5    REPATHA SURECLICK 140 mg/mL PnIj INJECT 1 ML (140 MG TOTAL) INTO THE SKIN EVERY 14 (FOURTEEN) DAYS. 6 mL 3    spironolactone (ALDACTONE) 25 MG tablet Take 0.5 tablets (12.5 mg total) by mouth 2 (two) times daily with meals. (Patient taking differently: Take 25 mg by mouth once daily. Takes 0.5 mg two times a day) 90 tablet 3    VITAMIN D2 1,250 mcg (50,000 unit) capsule TAKE 1 CAPSULE (50,000 UNITS TOTAL) BY MOUTH EVERY 7 DAYS. 12 capsule 5    vitamin K2 100 mcg Cap Take 1 capsule by mouth Daily.      [DISCONTINUED] albuterol (PROVENTIL) 2.5 mg /3 mL (0.083 %) nebulizer solution Take 3 mLs (2.5 mg total) by nebulization every 6 (six) hours as needed for Wheezing or Shortness of Breath. Rescue 30 each 0    [DISCONTINUED] ELIQUIS 5 mg Tab TAKE ONE BY MOUTH TWICE DAILY 60 tablet 11     No  facility-administered encounter medications on file as of 3/7/2023.       Plan:   Chronic bronchitis.  Start nebulizer treatment with xopenex.  Chest -ray today is clear and basic labs are normal.  Problem List Items Addressed This Visit    None  Visit Diagnoses       Asthma, unspecified asthma severity, unspecified whether complicated, unspecified whether persistent    -  Primary    Relevant Medications    levalbuterol (XOPENEX) 1.25 mg/3 mL nebulizer solution    COPD exacerbation        Relevant Medications    levalbuterol (XOPENEX) 1.25 mg/3 mL nebulizer solution    Chronic cough        Hypoxemia

## 2023-04-18 ENCOUNTER — PATIENT MESSAGE (OUTPATIENT)
Dept: CARDIOLOGY | Facility: CLINIC | Age: 88
End: 2023-04-18
Payer: MEDICARE

## 2023-04-18 RX ORDER — AMOXICILLIN 500 MG/1
2000 TABLET, FILM COATED ORAL
Qty: 4 TABLET | Refills: 3 | Status: SHIPPED | OUTPATIENT
Start: 2023-04-18

## 2023-05-17 ENCOUNTER — PES CALL (OUTPATIENT)
Dept: ADMINISTRATIVE | Facility: CLINIC | Age: 88
End: 2023-05-17
Payer: MEDICARE

## 2023-05-22 DIAGNOSIS — I10 ESSENTIAL HYPERTENSION: Chronic | ICD-10-CM

## 2023-05-22 DIAGNOSIS — I50.32 CHRONIC DIASTOLIC HEART FAILURE: ICD-10-CM

## 2023-05-22 RX ORDER — SPIRONOLACTONE 25 MG/1
12.5 TABLET ORAL 2 TIMES DAILY WITH MEALS
Qty: 90 TABLET | Refills: 3 | Status: SHIPPED | OUTPATIENT
Start: 2023-05-22 | End: 2024-05-21

## 2023-05-31 ENCOUNTER — OFFICE VISIT (OUTPATIENT)
Dept: CARDIOLOGY | Facility: CLINIC | Age: 88
End: 2023-05-31
Payer: MEDICARE

## 2023-05-31 VITALS
SYSTOLIC BLOOD PRESSURE: 120 MMHG | WEIGHT: 169.75 LBS | HEART RATE: 79 BPM | DIASTOLIC BLOOD PRESSURE: 80 MMHG | BODY MASS INDEX: 28.25 KG/M2

## 2023-05-31 DIAGNOSIS — Z95.1 HISTORY OF CORONARY ARTERY BYPASS GRAFT: Chronic | ICD-10-CM

## 2023-05-31 DIAGNOSIS — I50.32 CHRONIC DIASTOLIC HEART FAILURE: Chronic | ICD-10-CM

## 2023-05-31 DIAGNOSIS — E78.00 PURE HYPERCHOLESTEROLEMIA: Chronic | ICD-10-CM

## 2023-05-31 DIAGNOSIS — I10 ESSENTIAL HYPERTENSION: Chronic | ICD-10-CM

## 2023-05-31 DIAGNOSIS — Z79.01 ANTICOAGULATION MANAGEMENT ENCOUNTER: ICD-10-CM

## 2023-05-31 DIAGNOSIS — I65.23 BILATERAL CAROTID ARTERY STENOSIS: Chronic | ICD-10-CM

## 2023-05-31 DIAGNOSIS — I35.0 NONRHEUMATIC AORTIC VALVE STENOSIS: Chronic | ICD-10-CM

## 2023-05-31 DIAGNOSIS — Z98.890 HISTORY OF LEFT COMMON CAROTID ARTERY STENT PLACEMENT: ICD-10-CM

## 2023-05-31 DIAGNOSIS — I48.20 CHRONIC ATRIAL FIBRILLATION: Chronic | ICD-10-CM

## 2023-05-31 DIAGNOSIS — Z95.828 HISTORY OF LEFT COMMON CAROTID ARTERY STENT PLACEMENT: ICD-10-CM

## 2023-05-31 DIAGNOSIS — I25.10 ARTERIOSCLEROSIS OF CORONARY ARTERY: Primary | Chronic | ICD-10-CM

## 2023-05-31 DIAGNOSIS — I70.0 ATHEROSCLEROSIS OF AORTA: Chronic | ICD-10-CM

## 2023-05-31 DIAGNOSIS — Z95.2 S/P TAVR (TRANSCATHETER AORTIC VALVE REPLACEMENT): Chronic | ICD-10-CM

## 2023-05-31 DIAGNOSIS — Z51.81 ANTICOAGULATION MANAGEMENT ENCOUNTER: ICD-10-CM

## 2023-05-31 DIAGNOSIS — E66.3 OVERWEIGHT: ICD-10-CM

## 2023-05-31 PROCEDURE — 99213 OFFICE O/P EST LOW 20 MIN: CPT | Mod: PBBFAC | Performed by: INTERNAL MEDICINE

## 2023-05-31 PROCEDURE — 99999 PR PBB SHADOW E&M-EST. PATIENT-LVL III: ICD-10-PCS | Mod: PBBFAC,,, | Performed by: INTERNAL MEDICINE

## 2023-05-31 PROCEDURE — 99999 PR PBB SHADOW E&M-EST. PATIENT-LVL III: CPT | Mod: PBBFAC,,, | Performed by: INTERNAL MEDICINE

## 2023-05-31 PROCEDURE — 99214 PR OFFICE/OUTPT VISIT, EST, LEVL IV, 30-39 MIN: ICD-10-PCS | Mod: S$PBB,,, | Performed by: INTERNAL MEDICINE

## 2023-05-31 PROCEDURE — 99214 OFFICE O/P EST MOD 30 MIN: CPT | Mod: S$PBB,,, | Performed by: INTERNAL MEDICINE

## 2023-05-31 NOTE — PROGRESS NOTES
Subjective:   05/31/2023     Patient ID:  Damari Grant is a 89 y.o. female who presents for evaulation of No chief complaint on file.        Comes in for cardiac follow-up.  She does have a history of diastolic heart failure, her shortness breath has not worsened, she is not having increased edema was not tolerant of Jardiance.      She does have coronary artery disease and is status post coronary artery bypass grafting.  She had aortic valve stenosis in his status post TAVR.    Chronic atrial fibrillation is noted, currently anticoagulated with Eliquis, no bleeding, dose appropriate.      Hypercholesterolemia is present with intolerance statin therapy, continues on PCSK9 inhibitor therapy.    Blood pressure at home is very well controlled,    Prior visit:   She comes in now for follow-up of shortness of breath.  On previous visit, Jardiance 5 mg daily was added, but not tolerated due to UTIs; echocardiography does show normal TAVR valve function.  Pulmonary hypertension is present, probably secondary to diastolic dysfunction.   Spironolactone had added to her medications.  She has lost weight since then and, fluid.  She was seen in Pulmonary by Dr. Cardenas, her chest x-ray had cleared which she felt was probably due to treatment of her diastolic heart failure.  Her shortness breath has improved.  She is not having chest pains or tightness.  She has not had edema.    In June of 2020 she had a TIA and underwent a left carotid stent placement.  She subsequently underwent a TAVR for severe symptomatic aortic valve stenosis.  Echocardiography in August of 2021 shows left ventricular function to be normal.  The peak pulmonary artery systolic pressure was 40 mm Hg and there was moderate mitral regurgitation.  Left atrium appeared to be moderately dilated consistent with diastolic dysfunction, in atrial fibrillation though.  Laboratory work showed normal CBC and complete metabolic profile.  The calculated aortic valve  area was 1.9 centimeter squared, mean aortic valve gradient 6 mm Hg.    Coronary angiography from June of 2020 showed a 50% left main stenosis.  The LAD had an 80% origin stenosis and 90% proximal stenosis.  Circumflex had a 70% stenosis with severe disease in the 1st and 2nd marginal branches.  The right coronary artery had a 70% stenosis immediately proximal to the origin of the streak PDA which gave a small component of flow into the interventricular septum.  Strict PDA has a 70% origin stenosis.  PDA was small and had not been bypassed.  The left intermammary graft to the LAD was patent free of disease.  Saphenous vein graft to the diagonal was free of disease.  Saphenous vein graft to the 1st marginal was patent and free of disease.  Saphenous vein graft was grafted side-to-side to the 2nd marginal branch in end-to-side therapy marginal branch free of disease.  She was felt to have coronary artery disease medically treated.  She subsequently underwent carotid stenting and TAVR.    She does have chronic atrial fibrillation.  Her heart rate has been well controlled.  She has been anticoagulated with Eliquis.  She has not had bleeding problems.  She was also on aspirin, that has been discontinued    Hypercholesterolemia has been treated with PCSK9 inhibitor therapy.  She had been intolerant to statin therapy.  Currently on PCSK9 inhibitor every 2 weeks, last LDL 81.    She does have a history of iron deficiency, the last iron level was normal.  Last CBC showed no anemia      Shortness of Breath  Pertinent negatives include no chest pain, claudication, leg swelling, orthopnea, PND or syncope.   Atrial Fibrillation  Symptoms include shortness of breath. Symptoms are negative for chest pain, palpitations and syncope. Past medical history includes CHF.   Hypertension  Associated symptoms include malaise/fatigue and shortness of breath. Pertinent negatives include no chest pain, orthopnea, palpitations or PND.    Congestive Heart Failure  Associated symptoms include shortness of breath. Pertinent negatives include no chest pain, claudication, near-syncope or palpitations.     Patient Active Problem List   Diagnosis    Neuropathy    Hernia of abdominal wall    BOOP (bronchiolitis obliterans with organizing pneumonia)    Essential hypertension    Other constipation    Carotid artery disease    Nonrheumatic aortic valve stenosis    Chronic fatigue    Atherosclerosis of aorta    History of syncope    Risk for falls    Sensorineural hearing loss (SNHL) of both ears    Chronic atrial fibrillation    Arteriosclerosis of coronary artery    Pulmonary hypertension    History of left common carotid artery stent placement    S/P TAVR (transcatheter aortic valve replacement)    History of coronary artery bypass graft    Anticoagulation management encounter    Orthostatic hypotension    Pure hypercholesterolemia    Statin intolerance    Acquired hypothyroidism    Chronic diastolic heart failure    Iron deficiency    Overweight    Hemiplegia affecting right dominant side, unspecified etiology, unspecified hemiplegia type        Review of patient's allergies indicates:   Allergen Reactions    Codeine Nausea And Vomiting    Crestor [rosuvastatin] Other (See Comments)     Muscle weakness    Fosamax [alendronate] Other (See Comments)     Didn't tolerate over 10 years ago and doesn't remember what happened.     Livalo [pitavastatin] Other (See Comments)     Muscle pain    Simvastatin Other (See Comments)     Leg pains/ weakness         Current Outpatient Medications:     acetaminophen (TYLENOL) 500 MG tablet, Take 500 mg by mouth as needed., Disp: , Rfl:     albuterol (VENTOLIN HFA) 90 mcg/actuation inhaler, Inhale 2 puffs into the lungs every 6 (six) hours as needed for Wheezing. Rescue, Disp: 8 g, Rfl: 0    amoxicillin (AMOXIL) 500 MG Tab, Take 4 tablets (2,000 mg total) by mouth as needed (4 tablets 1 hour prior to dental appt). Take 4  tablets 1 hour prior to dental appt, Disp: 4 tablet, Rfl: 3    diclofenac sodium (VOLTAREN) 1 % Gel, Apply 2 g topically 3 (three) times daily. (Patient not taking: Reported on 2/23/2023), Disp: 200 g, Rfl: 1    ELIQUIS 5 mg Tab, TAKE ONE BY MOUTH TWICE DAILY, Disp: 60 tablet, Rfl: 11    estradioL (ESTRACE) 0.01 % (0.1 mg/gram) vaginal cream, 1 g once daily for 1 week then as needed thereafter (Patient not taking: Reported on 2/23/2023), Disp: 30 g, Rfl: 11    furosemide (LASIX) 20 MG tablet, Take 1 tablet (20 mg total) by mouth as needed (Swelling). (Patient not taking: Reported on 2/23/2023), Disp: 30 tablet, Rfl: 11    levalbuterol (XOPENEX) 1.25 mg/3 mL nebulizer solution, Take 3 mLs (1.25 mg total) by nebulization every 4 (four) hours as needed for Wheezing. Rescue, Disp: 90 each, Rfl: 11    levothyroxine (SYNTHROID) 25 MCG tablet, TAKE ONE DAILY BEFORE BREAKFAST, Disp: 90 tablet, Rfl: 2    metoprolol succinate (TOPROL-XL) 50 MG 24 hr tablet, TAKE (1/2) HALF BY MOUTH DAILY, Disp: 45 tablet, Rfl: 3    nystatin-triamcinolone (MYCOLOG II) cream, Apply topically 2 (two) times daily as needed. Apply to affected area as needed (Patient not taking: Reported on 2/23/2023), Disp: 60 g, Rfl: 5    REPATHA SURECLICK 140 mg/mL PnIj, INJECT 1 ML (140 MG TOTAL) INTO THE SKIN EVERY 14 (FOURTEEN) DAYS., Disp: 6 mL, Rfl: 3    spironolactone (ALDACTONE) 25 MG tablet, TAKE 0.5 TABLETS (12.5 MG TOTAL) BY MOUTH 2 (TWO) TIMES DAILY WITH MEALS., Disp: 90 tablet, Rfl: 3    VITAMIN D2 1,250 mcg (50,000 unit) capsule, TAKE 1 CAPSULE (50,000 UNITS TOTAL) BY MOUTH EVERY 7 DAYS., Disp: 12 capsule, Rfl: 5    vitamin K2 100 mcg Cap, Take 1 capsule by mouth Daily., Disp: , Rfl:      Objective:   Review of Systems   Constitutional: Positive for malaise/fatigue.   Cardiovascular:  Positive for dyspnea on exertion. Negative for chest pain, claudication, cyanosis, irregular heartbeat, leg swelling, near-syncope, orthopnea, palpitations, paroxysmal  nocturnal dyspnea and syncope.   Respiratory:  Positive for shortness of breath.      Vitals:    05/31/23 0822   BP: 120/80   Pulse: 79       Physical Exam  Vitals reviewed.   Constitutional:       General: She is not in acute distress.     Appearance: She is well-developed.   HENT:      Head: Normocephalic and atraumatic.      Nose: Nose normal.   Eyes:      Conjunctiva/sclera: Conjunctivae normal.      Pupils: Pupils are equal, round, and reactive to light.   Neck:      Vascular: No hepatojugular reflux or JVD.      Comments: Jugular venous pressure is normal  Cardiovascular:      Rate and Rhythm: Normal rate. Rhythm irregular.      Pulses: Intact distal pulses.      Heart sounds: Murmur (Grade 1 systolic ejection murmur) heard.     No friction rub. No gallop.   Pulmonary:      Effort: Pulmonary effort is normal. No respiratory distress.      Breath sounds: No wheezing or rales.   Chest:      Chest wall: No tenderness.   Abdominal:      General: Bowel sounds are normal. There is no distension.      Palpations: Abdomen is soft.      Tenderness: There is no abdominal tenderness.   Musculoskeletal:         General: No tenderness or deformity. Normal range of motion.      Cervical back: Normal range of motion and neck supple.      Right lower leg: Edema (Trace right) present.      Left lower leg: Edema (tr+) present.   Skin:     General: Skin is warm and dry.      Findings: No erythema or rash.   Neurological:      Mental Status: She is alert and oriented to person, place, and time.      Cranial Nerves: No cranial nerve deficit.      Motor: No abnormal muscle tone.      Coordination: Coordination normal.   Psychiatric:         Behavior: Behavior normal.         Thought Content: Thought content normal.         Judgment: Judgment normal.       Lab Results   Component Value Date     03/07/2023    K 4.6 03/07/2023     03/07/2023    CO2 26 03/07/2023    BUN 12 03/07/2023    CREATININE 0.7 03/07/2023      (H) 03/07/2023    HGBA1C 5.5 12/09/2022    MG 1.9 03/29/2022    AST 21 03/07/2023    ALT 21 03/07/2023    ALBUMIN 3.9 03/07/2023    PROT 7.8 03/07/2023    BILITOT 1.2 (H) 03/07/2023    WBC 5.11 03/07/2023    HGB 15.8 03/07/2023    HCT 49.5 (H) 03/07/2023    MCV 94 03/07/2023     03/07/2023    TSH 2.786 12/09/2022    CHOL 148 12/09/2022    HDL 50 12/09/2022    LDLCALC 80.6 12/09/2022    TRIG 87 12/09/2022     Assessment and Plan:     Arteriosclerosis of coronary artery  Comments:  Currently asymptomatic    Essential hypertension  Comments:  Well controlled    Bilateral carotid artery stenosis  Comments:  Asymptomatic    Nonrheumatic aortic valve stenosis  Comments:  Asymptomatic after TAVR    Atherosclerosis of aorta    Chronic atrial fibrillation  Comments:  Rate controlled    S/P TAVR (transcatheter aortic valve replacement)    History of coronary artery bypass graft    Pure hypercholesterolemia  Comments:  Recheck in December, on PCSK9 inhibitor    Chronic diastolic heart failure  Comments:  Currently compensated    History of left common carotid artery stent placement    Anticoagulation management encounter    Overweight           Follow up in about 6 months (around 11/30/2023).

## 2023-06-05 ENCOUNTER — PATIENT MESSAGE (OUTPATIENT)
Dept: CARDIOLOGY | Facility: CLINIC | Age: 88
End: 2023-06-05
Payer: MEDICARE

## 2023-07-05 ENCOUNTER — PATIENT OUTREACH (OUTPATIENT)
Dept: ADMINISTRATIVE | Facility: HOSPITAL | Age: 88
End: 2023-07-05
Payer: MEDICARE

## 2023-07-10 ENCOUNTER — OFFICE VISIT (OUTPATIENT)
Dept: HOME HEALTH SERVICES | Facility: CLINIC | Age: 88
End: 2023-07-10
Payer: MEDICARE

## 2023-07-10 VITALS
RESPIRATION RATE: 16 BRPM | SYSTOLIC BLOOD PRESSURE: 140 MMHG | DIASTOLIC BLOOD PRESSURE: 80 MMHG | TEMPERATURE: 98 F | OXYGEN SATURATION: 97 % | WEIGHT: 175 LBS | HEIGHT: 65 IN | BODY MASS INDEX: 29.16 KG/M2 | HEART RATE: 72 BPM

## 2023-07-10 DIAGNOSIS — J44.9 CHRONIC OBSTRUCTIVE PULMONARY DISEASE, UNSPECIFIED COPD TYPE: ICD-10-CM

## 2023-07-10 DIAGNOSIS — I10 ESSENTIAL HYPERTENSION: Chronic | ICD-10-CM

## 2023-07-10 DIAGNOSIS — I48.20 CHRONIC ATRIAL FIBRILLATION: Chronic | ICD-10-CM

## 2023-07-10 DIAGNOSIS — I65.23 BILATERAL CAROTID ARTERY STENOSIS: Chronic | ICD-10-CM

## 2023-07-10 DIAGNOSIS — E66.3 OVERWEIGHT: ICD-10-CM

## 2023-07-10 DIAGNOSIS — R26.9 ABNORMALITY OF GAIT AND MOBILITY: ICD-10-CM

## 2023-07-10 DIAGNOSIS — Z99.89 DEPENDENCE ON OTHER ENABLING MACHINES AND DEVICES: ICD-10-CM

## 2023-07-10 DIAGNOSIS — I50.32 CHRONIC DIASTOLIC HEART FAILURE: Chronic | ICD-10-CM

## 2023-07-10 DIAGNOSIS — J84.89 BOOP (BRONCHIOLITIS OBLITERANS WITH ORGANIZING PNEUMONIA): ICD-10-CM

## 2023-07-10 DIAGNOSIS — I27.20 PULMONARY HYPERTENSION: ICD-10-CM

## 2023-07-10 DIAGNOSIS — E03.9 ACQUIRED HYPOTHYROIDISM: ICD-10-CM

## 2023-07-10 DIAGNOSIS — H90.3 SENSORINEURAL HEARING LOSS (SNHL) OF BOTH EARS: ICD-10-CM

## 2023-07-10 DIAGNOSIS — I70.0 ATHEROSCLEROSIS OF AORTA: Chronic | ICD-10-CM

## 2023-07-10 DIAGNOSIS — G62.9 NEUROPATHY: ICD-10-CM

## 2023-07-10 DIAGNOSIS — G81.91 HEMIPLEGIA AFFECTING RIGHT DOMINANT SIDE, UNSPECIFIED ETIOLOGY, UNSPECIFIED HEMIPLEGIA TYPE: ICD-10-CM

## 2023-07-10 DIAGNOSIS — Z00.00 ENCOUNTER FOR PREVENTIVE HEALTH EXAMINATION: Primary | ICD-10-CM

## 2023-07-10 DIAGNOSIS — E78.00 PURE HYPERCHOLESTEROLEMIA: Chronic | ICD-10-CM

## 2023-07-10 PROCEDURE — G0439 PR MEDICARE ANNUAL WELLNESS SUBSEQUENT VISIT: ICD-10-PCS | Mod: S$GLB,,,

## 2023-07-10 PROCEDURE — G0439 PPPS, SUBSEQ VISIT: HCPCS | Mod: S$GLB,,,

## 2023-07-10 NOTE — PATIENT INSTRUCTIONS
Counseling and Referral of Other Preventative  (Italic type indicates deductible and co-insurance are waived)    Patient Name: Damari Grant  Today's Date: 7/10/2023    Health Maintenance       Date Due Completion Date    TETANUS VACCINE 09/13/2015 9/13/2005    DEXA Scan 06/29/2020 6/29/2018    COVID-19 Vaccine (3 - Pfizer series) 07/03/2021 5/8/2021    Influenza Vaccine (1) 09/01/2023 10/15/2022    Hemoglobin A1c (Prediabetes) 12/09/2023 12/9/2022    Lipid Panel 12/09/2027 12/9/2022        No orders of the defined types were placed in this encounter.    The following information is provided to all patients.  This information is to help you find resources for any of the problems found today that may be affecting your health:                Living healthy guide: www.[facility].[place].gov      Understanding Diabetes: www.diabetes.org      Eating healthy: www.cdc.gov/healthyweight      CDC home safety checklist: www.cdc.gov/steadi/patient.html      Agency on Aging: www.goea.[place].[facility]      Alcoholics anonymous (AA): www.aa.org      Physical Activity: www.loulou.nih.gov/uj4hgzb      Tobacco use: www.quitwithusla.org

## 2023-07-10 NOTE — PROGRESS NOTES
"Damari Grant presented for a  Medicare AWV and comprehensive Health Risk Assessment today. The following components were reviewed and updated:    Medical history  Family History  Social history  Allergies and Current Medications  Health Risk Assessment  Health Maintenance  Care Team         ** See Completed Assessments for Annual Wellness Visit within the encounter summary.**         The following assessments were completed:  Living Situation  CAGE  Depression Screening  Timed Get Up and Go  Whisper Test: Deferred, hearing impaired  Cognitive Function Screening:    Nutrition Screening  ADL Screening  PAQ Screening        Vitals:    07/10/23 0912   BP: (!) 140/80   BP Location: Left arm   Patient Position: Sitting   Pulse: 72   Resp: 16   Temp: 97.5 °F (36.4 °C)   SpO2: 97%   Weight: 79.4 kg (175 lb)   Height: 5' 5" (1.651 m)     Body mass index is 29.12 kg/m².    Physical Exam  Vitals reviewed.   Constitutional:       General: She is not in acute distress.     Appearance: Normal appearance.   HENT:      Right Ear: Hearing normal.      Left Ear: Hearing normal.   Cardiovascular:      Rate and Rhythm: Normal rate and regular rhythm.      Pulses: Normal pulses.      Heart sounds: No murmur heard.  Pulmonary:      Effort: Pulmonary effort is normal. No respiratory distress.      Breath sounds: Normal breath sounds.   Abdominal:      General: Bowel sounds are normal.   Musculoskeletal:      Right lower leg: No edema.      Left lower leg: No edema.   Neurological:      General: No focal deficit present.      Mental Status: She is alert and oriented to person, place, and time.   Psychiatric:         Mood and Affect: Mood normal.         Behavior: Behavior normal.             Diagnoses and health risks identified today and associated recommendations/orders:    1. Encounter for preventive health examination  Assessments completed.  HM recommendations reviewed.  F/u with PCP as instructed.     Patient not on chronic " opioids.   Risk factors reviewed for any potential opioid use disorder   Pain evaluated during visit.  Current treatment plan documented.  Will refer to specialist, as appropriate.      2. BOOP (bronchiolitis obliterans with organizing pneumonia)  Chronic, stable on current nebulizer regimen. Followed by PCP.     3. Chronic obstructive pulmonary disease, unspecified COPD type  Chronic, stable on current nebulizer regimen. Followed by PCP.     4. Atherosclerosis of aorta  Chronic, stable on current regimen. Statin intolerance. Followed by PCP.     5. Bilateral carotid artery stenosis  Chronic, stable on current regimen. Followed by PCP.     6. Hemiplegia affecting right dominant side, unspecified etiology, unspecified hemiplegia type  Chronic, stable on current regimen. Followed by PCP.     7. Chronic atrial fibrillation  Chronic, stable on current Eliquis regimen. Followed by PCP.     8. Pulmonary hypertension  Chronic, stable on current regimen. Followed by PCP.     9. Chronic diastolic heart failure  Chronic, stable on current Metoprolol & Aldactone regimen. Followed by PCP.     10. Essential hypertension  Chronic, stable on current Metoprolol regimen. Followed by PCP.     11. Pure hypercholesterolemia  Chronic, stable on current regimen. Statin intolerance. Followed by PCP.     12. Acquired hypothyroidism  Chronic, stable on current Synthroid regimen. Followed by PCP.     13. Overweight  Chronic, stable on current regimen. Followed by PCP.     14. Neuropathy  Chronic, stable on current regimen. Followed by PCP.     15. Sensorineural hearing loss (SNHL) of both ears  Has hearing aids.     16. Abnormality of gait and mobility  Uses cane or walker as needed.     17. Dependence on other enabling machines and devices  Uses cane or walker as needed.       Provided Damari with a 5-10 year written screening schedule and personal prevention plan. Recommendations were developed using the USPSTF age appropriate  recommendations. Education, counseling, and referrals were provided as needed. After Visit Summary printed and given to patient which includes a list of additional screenings\tests needed.    Follow up in about 1 year (around 7/10/2024) for Medicare AWV.    Ines Hussein NP    I offered to discuss advanced care planning, including how to pick a person who would make decisions for you if you were unable to make them for yourself, called a health care power of , and what kind of decisions you might make such as use of life sustaining treatments such as ventilators and tube feeding when faced with a life limiting illness recorded on a living will that they will need to know. (How you want to be cared for as you near the end of your natural life)     X  Patient has advanced directives on file, which we reviewed, and they do not wish to make changes.

## 2023-08-02 ENCOUNTER — PATIENT MESSAGE (OUTPATIENT)
Dept: INTERNAL MEDICINE | Facility: CLINIC | Age: 88
End: 2023-08-02
Payer: MEDICARE

## 2023-09-21 RX ORDER — LEVOTHYROXINE SODIUM 25 UG/1
TABLET ORAL
Qty: 90 TABLET | Refills: 0 | Status: SHIPPED | OUTPATIENT
Start: 2023-09-21 | End: 2024-03-15

## 2023-09-21 NOTE — TELEPHONE ENCOUNTER
Refill Decision Note   Damari Grant  is requesting a refill authorization.  Brief Assessment and Rationale for Refill:  Approve     Medication Therapy Plan:       Medication Reconciliation Completed: No   Comments:     No Care Gaps recommended.     Note composed:10:31 AM 09/21/2023

## 2023-09-22 ENCOUNTER — PATIENT MESSAGE (OUTPATIENT)
Dept: CARDIOLOGY | Facility: CLINIC | Age: 88
End: 2023-09-22
Payer: MEDICARE

## 2023-10-02 RX ORDER — AMOXICILLIN 250 MG/5ML
POWDER, FOR SUSPENSION ORAL
Qty: 150 ML | Refills: 2 | Status: SHIPPED | OUTPATIENT
Start: 2023-10-02 | End: 2023-11-29

## 2023-10-17 ENCOUNTER — PATIENT MESSAGE (OUTPATIENT)
Dept: INTERNAL MEDICINE | Facility: CLINIC | Age: 88
End: 2023-10-17
Payer: MEDICARE

## 2023-11-14 ENCOUNTER — PATIENT MESSAGE (OUTPATIENT)
Dept: ADMINISTRATIVE | Facility: HOSPITAL | Age: 88
End: 2023-11-14
Payer: MEDICARE

## 2023-11-28 NOTE — PROGRESS NOTES
Subjective:   01/10/2023     Patient ID:  Damari Grant is a 89 y.o. female who presents for evaulation of Chronic diastolic heart failure        She comes in now for follow-up of shortness of breath.  On previous visit, Jardiance 5 mg daily was added, but not tolerated due to UTIs; echocardiography does show normal TAVR valve function.  Pulmonary hypertension is present, probably secondary to diastolic dysfunction.   Spironolactone had added to her medications.  She has lost weight since then and, fluid.  She was seen in Pulmonary by Dr. Cardenas, her chest x-ray had cleared which she felt was probably due to treatment of her diastolic heart failure.  Her shortness breath has improved.  She is not having chest pains or tightness.  She has not had edema.    In June of 2020 she had a TIA and underwent a left carotid stent placement.  She subsequently underwent a TAVR for severe symptomatic aortic valve stenosis.  Echocardiography in August of 2021 shows left ventricular function to be normal.  The peak pulmonary artery systolic pressure was 40 mm Hg and there was moderate mitral regurgitation.  Left atrium appeared to be moderately dilated consistent with diastolic dysfunction, in atrial fibrillation though.  Laboratory work showed normal CBC and complete metabolic profile.  The calculated aortic valve area was 1.9 centimeter squared, mean aortic valve gradient 6 mm Hg.    Coronary angiography from June of 2020 showed a 50% left main stenosis.  The LAD had an 80% origin stenosis and 90% proximal stenosis.  Circumflex had a 70% stenosis with severe disease in the 1st and 2nd marginal branches.  The right coronary artery had a 70% stenosis immediately proximal to the origin of the streak PDA which gave a small component of flow into the interventricular septum.  Strict PDA has a 70% origin stenosis.  PDA was small and had not been bypassed.  The left intermammary graft to the LAD was patent free of disease.  Saphenous  vein graft to the diagonal was free of disease.  Saphenous vein graft to the 1st marginal was patent and free of disease.  Saphenous vein graft was grafted side-to-side to the 2nd marginal branch in end-to-side therapy marginal branch free of disease.  She was felt to have coronary artery disease medically treated.  She subsequently underwent carotid stenting and TAVR.    She does have chronic atrial fibrillation.  Her heart rate has been well controlled.  She has been anticoagulated with Eliquis.  She has not had bleeding problems.  She was also on aspirin, that has been discontinued    Hypercholesterolemia has been treated with PCSK9 inhibitor therapy.  She had been intolerant to statin therapy.  Currently on PCSK9 inhibitor every 2 weeks, last LDL 81.    She does have a history of iron deficiency, the last iron level was normal.  Last CBC showed no anemia      Shortness of Breath  Pertinent negatives include no chest pain, claudication, leg swelling, orthopnea, PND or syncope.   Atrial Fibrillation  Symptoms include shortness of breath. Symptoms are negative for chest pain, palpitations and syncope. Past medical history includes atrial fibrillation and CHF.   Hypertension  Associated symptoms include malaise/fatigue and shortness of breath. Pertinent negatives include no chest pain, orthopnea, palpitations or PND.   Congestive Heart Failure  Associated symptoms include shortness of breath. Pertinent negatives include no chest pain, claudication, near-syncope or palpitations.     Patient Active Problem List   Diagnosis    Neuropathy    Hernia of abdominal wall    BOOP (bronchiolitis obliterans with organizing pneumonia)    Essential hypertension    Other constipation    Carotid artery disease    Nonrheumatic aortic valve stenosis    Chronic fatigue    Atherosclerosis of aorta    History of syncope    Risk for falls    Sensorineural hearing loss (SNHL) of both ears    Chronic atrial fibrillation    Arteriosclerosis  of coronary artery    Pulmonary hypertension    History of left common carotid artery stent placement    S/P TAVR (transcatheter aortic valve replacement)    History of coronary artery bypass graft    Anticoagulation management encounter    Orthostatic hypotension    Pure hypercholesterolemia    Statin intolerance    Acquired hypothyroidism    Chronic diastolic heart failure    Iron deficiency    Morbid (severe) obesity due to excess calories    Hemiplegia affecting right dominant side, unspecified etiology, unspecified hemiplegia type        Review of patient's allergies indicates:   Allergen Reactions    Codeine Nausea And Vomiting    Crestor [rosuvastatin] Other (See Comments)     Muscle weakness    Fosamax [alendronate] Other (See Comments)     Didn't tolerate over 10 years ago and doesn't remember what happened.     Livalo [pitavastatin] Other (See Comments)     Muscle pain    Simvastatin Other (See Comments)     Leg pains/ weakness         Current Outpatient Medications:     acetaminophen (TYLENOL) 500 MG tablet, Take 500 mg by mouth as needed., Disp: , Rfl:     diclofenac sodium (VOLTAREN) 1 % Gel, Apply 2 g topically 3 (three) times daily., Disp: 200 g, Rfl: 1    ELIQUIS 5 mg Tab, TAKE ONE BY MOUTH TWICE DAILY, Disp: 60 tablet, Rfl: 11    ergocalciferol (ERGOCALCIFEROL) 50,000 unit Cap, Take 1 capsule (50,000 Units total) by mouth every 7 days., Disp: 12 capsule, Rfl: 5    estradioL (ESTRACE) 0.01 % (0.1 mg/gram) vaginal cream, 1 g once daily for 1 week then as needed thereafter, Disp: 30 g, Rfl: 11    evolocumab (REPATHA SURECLICK) 140 mg/mL PnIj, Inject 1 mL (140 mg total) into the skin every 14 (fourteen) days., Disp: 6 mL, Rfl: 3    levothyroxine (SYNTHROID) 25 MCG tablet, TAKE ONE DAILY BEFORE BREAKFAST, Disp: 90 tablet, Rfl: 0    metoprolol succinate (TOPROL-XL) 50 MG 24 hr tablet, TAKE (1/2) HALF BY MOUTH DAILY, Disp: 45 tablet, Rfl: 3    nystatin-triamcinolone (MYCOLOG II) cream, Apply topically 2  (two) times daily as needed. Apply to affected area as needed, Disp: 60 g, Rfl: 5    spironolactone (ALDACTONE) 25 MG tablet, Take 0.5 tablets (12.5 mg total) by mouth 2 (two) times daily with meals. (Patient taking differently: Take 25 mg by mouth once daily. Takes 0.5 mg two times a day), Disp: 90 tablet, Rfl: 3    vitamin K2 100 mcg Cap, Take 1 capsule by mouth Daily., Disp: , Rfl:     furosemide (LASIX) 20 MG tablet, Take 1 tablet (20 mg total) by mouth as needed (Swelling)., Disp: 30 tablet, Rfl: 11     Objective:   Review of Systems   Constitutional: Positive for malaise/fatigue.   Cardiovascular:  Positive for dyspnea on exertion. Negative for chest pain, claudication, cyanosis, irregular heartbeat, leg swelling, near-syncope, orthopnea, palpitations, paroxysmal nocturnal dyspnea and syncope.   Respiratory:  Positive for shortness of breath.      Vitals:    01/10/23 0925   BP: (!) 149/87   Pulse: 75       Physical Exam  Vitals reviewed.   Constitutional:       General: She is not in acute distress.     Appearance: She is well-developed.   HENT:      Head: Normocephalic and atraumatic.      Nose: Nose normal.   Eyes:      Conjunctiva/sclera: Conjunctivae normal.      Pupils: Pupils are equal, round, and reactive to light.   Neck:      Vascular: No hepatojugular reflux or JVD.      Comments: Jugular venous pressure is normal  Cardiovascular:      Rate and Rhythm: Normal rate. Rhythm irregular.      Pulses: Intact distal pulses.      Heart sounds: Murmur (Grade 1 systolic ejection murmur) heard.     No friction rub. No gallop.   Pulmonary:      Effort: Pulmonary effort is normal. No respiratory distress.      Breath sounds: No wheezing or rales.   Chest:      Chest wall: No tenderness.   Abdominal:      General: Bowel sounds are normal. There is no distension.      Palpations: Abdomen is soft.      Tenderness: There is no abdominal tenderness.   Musculoskeletal:         General: No tenderness or deformity.  88.5 Normal range of motion.      Cervical back: Normal range of motion and neck supple.      Right lower leg: Edema (Trace right) present.      Left lower leg: Edema (tr+) present.   Skin:     General: Skin is warm and dry.      Findings: No erythema or rash.   Neurological:      Mental Status: She is alert and oriented to person, place, and time.      Cranial Nerves: No cranial nerve deficit.      Motor: No abnormal muscle tone.      Coordination: Coordination normal.   Psychiatric:         Behavior: Behavior normal.         Thought Content: Thought content normal.         Judgment: Judgment normal.       Lab Results   Component Value Date     12/09/2022    K 4.8 12/09/2022     12/09/2022    CO2 26 12/09/2022    BUN 12 12/09/2022    CREATININE 0.8 12/09/2022     (H) 12/09/2022    HGBA1C 5.5 12/09/2022    MG 1.9 03/29/2022    AST 20 12/09/2022    ALT 15 12/09/2022    ALBUMIN 3.9 12/09/2022    PROT 7.4 12/09/2022    BILITOT 1.2 (H) 12/09/2022    WBC 3.54 (L) 12/09/2022    HGB 14.3 12/09/2022    HCT 46.3 12/09/2022    MCV 95 12/09/2022     12/09/2022    TSH 2.786 12/09/2022    CHOL 148 12/09/2022    HDL 50 12/09/2022    LDLCALC 80.6 12/09/2022    TRIG 87 12/09/2022     Assessment and Plan:     Chronic diastolic heart failure  Comments:  Appears to be doing well on spironolactone and PRN Lasix    Arteriosclerosis of coronary artery  Comments:  Currently asymptomatic    Pure hypercholesterolemia  Comments:  Statin intolerant   On PCSK9 inhibitor  Orders:  -     furosemide (LASIX) 20 MG tablet; Take 1 tablet (20 mg total) by mouth as needed (Swelling).  Dispense: 30 tablet; Refill: 11    History of coronary artery bypass graft    S/P TAVR (transcatheter aortic valve replacement)  Comments:  Normal function    Chronic atrial fibrillation  Comments:  Continue anticoagulation    Atherosclerosis of aorta    Bilateral carotid artery stenosis  Comments:  Asymptomatic    Essential  hypertension  Comments:  Well controlled           Follow up in about 5 months (around 6/1/2023).

## 2023-11-29 ENCOUNTER — OFFICE VISIT (OUTPATIENT)
Dept: CARDIOLOGY | Facility: CLINIC | Age: 88
End: 2023-11-29
Payer: MEDICARE

## 2023-11-29 VITALS
SYSTOLIC BLOOD PRESSURE: 128 MMHG | BODY MASS INDEX: 30.49 KG/M2 | WEIGHT: 183.19 LBS | DIASTOLIC BLOOD PRESSURE: 74 MMHG | HEART RATE: 73 BPM

## 2023-11-29 DIAGNOSIS — I48.20 CHRONIC ATRIAL FIBRILLATION: Chronic | ICD-10-CM

## 2023-11-29 DIAGNOSIS — I50.32 CHRONIC DIASTOLIC HEART FAILURE: Primary | Chronic | ICD-10-CM

## 2023-11-29 DIAGNOSIS — I25.10 ARTERIOSCLEROSIS OF CORONARY ARTERY: Chronic | ICD-10-CM

## 2023-11-29 DIAGNOSIS — Z95.828 HISTORY OF LEFT COMMON CAROTID ARTERY STENT PLACEMENT: ICD-10-CM

## 2023-11-29 DIAGNOSIS — Z95.1 HISTORY OF CORONARY ARTERY BYPASS GRAFT: Chronic | ICD-10-CM

## 2023-11-29 DIAGNOSIS — Z95.2 S/P TAVR (TRANSCATHETER AORTIC VALVE REPLACEMENT): Chronic | ICD-10-CM

## 2023-11-29 DIAGNOSIS — I10 ESSENTIAL HYPERTENSION: Chronic | ICD-10-CM

## 2023-11-29 DIAGNOSIS — Z98.890 HISTORY OF LEFT COMMON CAROTID ARTERY STENT PLACEMENT: ICD-10-CM

## 2023-11-29 PROCEDURE — 99213 PR OFFICE/OUTPT VISIT, EST, LEVL III, 20-29 MIN: ICD-10-PCS | Mod: S$PBB,,, | Performed by: INTERNAL MEDICINE

## 2023-11-29 PROCEDURE — 99999 PR PBB SHADOW E&M-EST. PATIENT-LVL III: ICD-10-PCS | Mod: PBBFAC,,, | Performed by: INTERNAL MEDICINE

## 2023-11-29 PROCEDURE — 99999 PR PBB SHADOW E&M-EST. PATIENT-LVL III: CPT | Mod: PBBFAC,,, | Performed by: INTERNAL MEDICINE

## 2023-11-29 PROCEDURE — 99213 OFFICE O/P EST LOW 20 MIN: CPT | Mod: S$PBB,,, | Performed by: INTERNAL MEDICINE

## 2023-11-29 PROCEDURE — 99213 OFFICE O/P EST LOW 20 MIN: CPT | Mod: PBBFAC | Performed by: INTERNAL MEDICINE

## 2023-11-29 NOTE — PROGRESS NOTES
Subjective:   11/29/2023     Patient ID:  Damari Grant is a 89 y.o. female who presents for evaulation of Follow-up        Comes in for cardiac follow-up.  She does have a history of diastolic heart failure, her shortness breath has not worsened, she is not having increased edema was not tolerant of Jardiance.  She continues take spironolactone, blood pressure is well controlled, takes furosemide as needed for swelling or shortness of breath    She does have coronary artery disease and is status post coronary artery bypass grafting.  She had aortic valve stenosis in his status post TAVR.    Chronic atrial fibrillation is noted, currently anticoagulated with Eliquis, no bleeding, dose appropriate.      Hypercholesterolemia is present with intolerance statin therapy, continues on PCSK9 inhibitor therapy.    Blood pressure at home is very well controlled    Prior visit:   She comes in now for follow-up of shortness of breath.  On previous visit, Jardiance 5 mg daily was added, but not tolerated due to UTIs; echocardiography does show normal TAVR valve function.  Pulmonary hypertension is present, probably secondary to diastolic dysfunction.   Spironolactone had added to her medications.  She has lost weight since then and, fluid.  She was seen in Pulmonary by Dr. Cardenas, her chest x-ray had cleared which she felt was probably due to treatment of her diastolic heart failure.  Her shortness breath has improved.  She is not having chest pains or tightness.  She has not had edema.    In June of 2020 she had a TIA and underwent a left carotid stent placement.  She subsequently underwent a TAVR for severe symptomatic aortic valve stenosis.  Echocardiography in August of 2021 shows left ventricular function to be normal.  The peak pulmonary artery systolic pressure was 40 mm Hg and there was moderate mitral regurgitation.  Left atrium appeared to be moderately dilated consistent with diastolic dysfunction, in atrial  fibrillation though.  Laboratory work showed normal CBC and complete metabolic profile.  The calculated aortic valve area was 1.9 centimeter squared, mean aortic valve gradient 6 mm Hg.    Coronary angiography from June of 2020 showed a 50% left main stenosis.  The LAD had an 80% origin stenosis and 90% proximal stenosis.  Circumflex had a 70% stenosis with severe disease in the 1st and 2nd marginal branches.  The right coronary artery had a 70% stenosis immediately proximal to the origin of the streak PDA which gave a small component of flow into the interventricular septum.  Strict PDA has a 70% origin stenosis.  PDA was small and had not been bypassed.  The left intermammary graft to the LAD was patent free of disease.  Saphenous vein graft to the diagonal was free of disease.  Saphenous vein graft to the 1st marginal was patent and free of disease.  Saphenous vein graft was grafted side-to-side to the 2nd marginal branch in end-to-side therapy marginal branch free of disease.  She was felt to have coronary artery disease medically treated.  She subsequently underwent carotid stenting and TAVR.    She does have chronic atrial fibrillation.  Her heart rate has been well controlled.  She has been anticoagulated with Eliquis.  She has not had bleeding problems.  She was also on aspirin, that has been discontinued    Hypercholesterolemia has been treated with PCSK9 inhibitor therapy.  She had been intolerant to statin therapy.  Currently on PCSK9 inhibitor every 2 weeks, last LDL 81.    She does have a history of iron deficiency, the last iron level was normal.  Last CBC showed no anemia      Shortness of Breath  Pertinent negatives include no chest pain, claudication, leg swelling, orthopnea, PND or syncope.   Atrial Fibrillation  Symptoms include shortness of breath. Symptoms are negative for chest pain, palpitations and syncope. Past medical history includes CHF.   Hypertension  Associated symptoms include  malaise/fatigue and shortness of breath. Pertinent negatives include no chest pain, orthopnea, palpitations or PND.   Congestive Heart Failure  Associated symptoms include shortness of breath. Pertinent negatives include no chest pain, claudication, near-syncope or palpitations.   Follow-up  Pertinent negatives include no chest pain.       Patient Active Problem List   Diagnosis    Neuropathy    Hernia of abdominal wall    BOOP (bronchiolitis obliterans with organizing pneumonia)    Essential hypertension    Other constipation    Carotid artery disease    Nonrheumatic aortic valve stenosis    Chronic fatigue    Atherosclerosis of aorta    History of syncope    Risk for falls    Sensorineural hearing loss (SNHL) of both ears    Chronic atrial fibrillation    Arteriosclerosis of coronary artery    Pulmonary hypertension    History of left common carotid artery stent placement    S/P TAVR (transcatheter aortic valve replacement)    History of coronary artery bypass graft    Anticoagulation management encounter    Orthostatic hypotension    Pure hypercholesterolemia    Statin intolerance    Acquired hypothyroidism    Chronic diastolic heart failure    Iron deficiency    Overweight    Hemiplegia affecting right dominant side, unspecified etiology, unspecified hemiplegia type        Review of patient's allergies indicates:   Allergen Reactions    Codeine Nausea And Vomiting    Crestor [rosuvastatin] Other (See Comments)     Muscle weakness    Fosamax [alendronate] Other (See Comments)     Didn't tolerate over 10 years ago and doesn't remember what happened.     Livalo [pitavastatin] Other (See Comments)     Muscle pain    Simvastatin Other (See Comments)     Leg pains/ weakness         Current Outpatient Medications:     acetaminophen (TYLENOL) 500 MG tablet, Take 500 mg by mouth as needed., Disp: , Rfl:     amoxicillin (AMOXIL) 250 mg/5 mL suspension, TAKE EIGHT TEASPOONFUL BY MOUTH AS NEEDED PRIOR TO DENTAL APPOINTMENT,  Disp: 150 mL, Rfl: 2    ELIQUIS 5 mg Tab, TAKE ONE BY MOUTH TWICE DAILY, Disp: 60 tablet, Rfl: 11    furosemide (LASIX) 20 MG tablet, Take 1 tablet (20 mg total) by mouth as needed (Swelling)., Disp: 30 tablet, Rfl: 11    levalbuterol (XOPENEX) 1.25 mg/3 mL nebulizer solution, Take 3 mLs (1.25 mg total) by nebulization every 4 (four) hours as needed for Wheezing. Rescue, Disp: 90 each, Rfl: 11    levothyroxine (SYNTHROID) 25 MCG tablet, TAKE ONE DAILY BEFORE BREAKFAST, Disp: 90 tablet, Rfl: 0    metoprolol succinate (TOPROL-XL) 50 MG 24 hr tablet, TAKE (1/2) HALF BY MOUTH DAILY, Disp: 45 tablet, Rfl: 3    REPATHA SURECLICK 140 mg/mL PnIj, INJECT 1 ML (140 MG TOTAL) INTO THE SKIN EVERY 14 (FOURTEEN) DAYS., Disp: 6 mL, Rfl: 3    spironolactone (ALDACTONE) 25 MG tablet, TAKE 0.5 TABLETS (12.5 MG TOTAL) BY MOUTH 2 (TWO) TIMES DAILY WITH MEALS., Disp: 90 tablet, Rfl: 3    VITAMIN D2 1,250 mcg (50,000 unit) capsule, TAKE 1 CAPSULE (50,000 UNITS TOTAL) BY MOUTH EVERY 7 DAYS., Disp: 12 capsule, Rfl: 5    amoxicillin (AMOXIL) 500 MG Tab, Take 4 tablets (2,000 mg total) by mouth as needed (4 tablets 1 hour prior to dental appt). Take 4 tablets 1 hour prior to dental appt, Disp: 4 tablet, Rfl: 3     Objective:   Review of Systems   Constitutional: Positive for malaise/fatigue.   Cardiovascular:  Positive for dyspnea on exertion. Negative for chest pain, claudication, cyanosis, irregular heartbeat, leg swelling, near-syncope, orthopnea, palpitations, paroxysmal nocturnal dyspnea and syncope.   Respiratory:  Positive for shortness of breath.        Vitals:    11/29/23 0827   BP: 128/74   Pulse: 73       Physical Exam  Vitals reviewed.   Constitutional:       General: She is not in acute distress.     Appearance: She is well-developed.   HENT:      Head: Normocephalic and atraumatic.      Nose: Nose normal.   Eyes:      Conjunctiva/sclera: Conjunctivae normal.      Pupils: Pupils are equal, round, and reactive to light.   Neck:       Vascular: No hepatojugular reflux or JVD.      Comments: Jugular venous pressure is normal  Cardiovascular:      Rate and Rhythm: Normal rate. Rhythm irregular.      Pulses: Intact distal pulses.      Heart sounds: Murmur (Grade 1 systolic ejection murmur) heard.      No friction rub. No gallop.   Pulmonary:      Effort: Pulmonary effort is normal. No respiratory distress.      Breath sounds: No wheezing or rales.   Chest:      Chest wall: No tenderness.   Abdominal:      General: Bowel sounds are normal. There is no distension.      Palpations: Abdomen is soft.      Tenderness: There is no abdominal tenderness.   Musculoskeletal:         General: No tenderness or deformity. Normal range of motion.      Cervical back: Normal range of motion and neck supple.      Right lower leg: No edema.      Left lower leg: Edema (tr+) present.   Skin:     General: Skin is warm and dry.      Findings: No erythema or rash.   Neurological:      Mental Status: She is alert and oriented to person, place, and time.      Cranial Nerves: No cranial nerve deficit.      Motor: No abnormal muscle tone.      Coordination: Coordination normal.   Psychiatric:         Behavior: Behavior normal.         Thought Content: Thought content normal.         Judgment: Judgment normal.         Lab Results   Component Value Date     03/07/2023    K 4.6 03/07/2023     03/07/2023    CO2 26 03/07/2023    BUN 12 03/07/2023    CREATININE 0.7 03/07/2023     (H) 03/07/2023    HGBA1C 5.5 12/09/2022    MG 1.9 03/29/2022    AST 21 03/07/2023    ALT 21 03/07/2023    ALBUMIN 3.9 03/07/2023    PROT 7.8 03/07/2023    BILITOT 1.2 (H) 03/07/2023    WBC 5.11 03/07/2023    HGB 15.8 03/07/2023    HCT 49.5 (H) 03/07/2023    MCV 94 03/07/2023     03/07/2023    TSH 2.786 12/09/2022    CHOL 148 12/09/2022    HDL 50 12/09/2022    LDLCALC 80.6 12/09/2022    TRIG 87 12/09/2022     Assessment and Plan:     Chronic diastolic heart  failure  Comments:  Currently compensated    Essential hypertension  Comments:  Well controlled    Chronic atrial fibrillation  Comments:  Rate controlled, accepted as rhythm of choice    Arteriosclerosis of coronary artery  Comments:  Asymptomatic    History of coronary artery bypass graft    History of left common carotid artery stent placement    S/P TAVR (transcatheter aortic valve replacement)         Will see PCP and get regular blood work including lipid profile and other lab.    Follow up in about 6 months (around 5/29/2024).

## 2024-01-02 ENCOUNTER — PATIENT MESSAGE (OUTPATIENT)
Dept: INTERNAL MEDICINE | Facility: CLINIC | Age: 89
End: 2024-01-02
Payer: MEDICARE

## 2024-01-02 DIAGNOSIS — Z00.00 PHYSICAL EXAM, ANNUAL: Primary | ICD-10-CM

## 2024-01-02 DIAGNOSIS — R73.03 PREDIABETES: ICD-10-CM

## 2024-01-02 DIAGNOSIS — R53.82 CHRONIC FATIGUE: ICD-10-CM

## 2024-01-02 DIAGNOSIS — I65.23 BILATERAL CAROTID ARTERY STENOSIS: Chronic | ICD-10-CM

## 2024-01-02 DIAGNOSIS — I27.20 PULMONARY HYPERTENSION: ICD-10-CM

## 2024-01-04 ENCOUNTER — LAB VISIT (OUTPATIENT)
Dept: LAB | Facility: HOSPITAL | Age: 89
End: 2024-01-04
Attending: STUDENT IN AN ORGANIZED HEALTH CARE EDUCATION/TRAINING PROGRAM
Payer: MEDICARE

## 2024-01-04 DIAGNOSIS — I27.20 PULMONARY HYPERTENSION: ICD-10-CM

## 2024-01-04 DIAGNOSIS — R73.03 PREDIABETES: ICD-10-CM

## 2024-01-04 DIAGNOSIS — Z00.00 PHYSICAL EXAM, ANNUAL: ICD-10-CM

## 2024-01-04 DIAGNOSIS — R53.82 CHRONIC FATIGUE: ICD-10-CM

## 2024-01-04 PROBLEM — J44.9 CHRONIC OBSTRUCTIVE PULMONARY DISEASE, UNSPECIFIED COPD TYPE: Status: ACTIVE | Noted: 2024-01-04

## 2024-01-04 LAB
BILIRUB UR QL STRIP: NEGATIVE
CLARITY UR REFRACT.AUTO: CLEAR
COLOR UR AUTO: YELLOW
GLUCOSE UR QL STRIP: NEGATIVE
HGB UR QL STRIP: NEGATIVE
KETONES UR QL STRIP: NEGATIVE
LEUKOCYTE ESTERASE UR QL STRIP: NEGATIVE
NITRITE UR QL STRIP: NEGATIVE
PH UR STRIP: 5 [PH] (ref 5–8)
PROT UR QL STRIP: NEGATIVE
SP GR UR STRIP: 1.02 (ref 1–1.03)
URN SPEC COLLECT METH UR: NORMAL

## 2024-01-04 PROCEDURE — 81003 URINALYSIS AUTO W/O SCOPE: CPT | Performed by: STUDENT IN AN ORGANIZED HEALTH CARE EDUCATION/TRAINING PROGRAM

## 2024-01-04 NOTE — PROGRESS NOTES
Subjective:      Chief Complaint:  Annual    HPI  Ms. Grant is a very nice [age]yo F with SNHL, BOOP,  TIA, aortic atherosclerosis s/p TAVR with 2/2 LBBB), Afib, CAD s/p CABG, HTN, HLD, atrophic vaginitis with relapsing remitting chronic dysuria (Estrace cream), severe carotid artery stenosis (L-70/80%; R- 30/40%) s/p stenting of L-internal carotid-A, aortic stenosis, hypothyroidism presenting for annual physical     Annual screening labs gathered in preparation for this appointment:  -TSH/T4: Within normal limits   -lipid panel:  Extremely well controlled   -CMP:  Minimal fasting glucose elevation only (consistent with diagnosis of prediabetes)  -CBC:  Negligible abnormalities only    Family, social, surgical Hx reviewed     Health Maintenance:  - due for repeat DEXA (formally osteoarthritic at last check with no current interest in re-evaluation and/or treatment) and routine vaccinations    Past Medical History:   Diagnosis Date    Syncope and collapse      Past Surgical History:   Procedure Laterality Date    ADENOIDECTOMY      AORTIC VALVE REPLACEMENT      APPENDECTOMY      CARDIAC VALVE REPLACEMENT      CARDIAC VALVE SURGERY      CORONARY ARTERY BYPASS GRAFT      HYSTERECTOMY      TONSILLECTOMY       Family History   Problem Relation Age of Onset    Cancer Mother      Social History     Socioeconomic History    Marital status:    Tobacco Use    Smoking status: Never    Smokeless tobacco: Never   Substance and Sexual Activity    Alcohol use: Not Currently     Alcohol/week: 0.0 standard drinks of alcohol    Drug use: No    Sexual activity: Not Currently     Partners: Male     Social Determinants of Health     Financial Resource Strain: Low Risk  (7/10/2023)    Overall Financial Resource Strain (CARDIA)     Difficulty of Paying Living Expenses: Not hard at all   Food Insecurity: No Food Insecurity (7/10/2023)    Hunger Vital Sign     Worried About Running Out of Food in the Last Year: Never true      Ran Out of Food in the Last Year: Never true   Transportation Needs: No Transportation Needs (7/10/2023)    PRAPARE - Transportation     Lack of Transportation (Medical): No     Lack of Transportation (Non-Medical): No   Physical Activity: Inactive (7/10/2023)    Exercise Vital Sign     Days of Exercise per Week: 0 days     Minutes of Exercise per Session: 0 min   Stress: No Stress Concern Present (7/10/2023)    [employer] - Occupational Stress Questionnaire     Feeling of Stress : Not at all   Social Connections: Moderately Isolated (7/10/2023)    Social Connection and Isolation Panel [NHANES]     Frequency of Communication with Friends and Family: More than three times a week     Frequency of Social Gatherings with Friends and Family: More than three times a week     Attends [employer] Services: More than 4 times per year     Active Member of Clubs or Organizations: No     Attends Club or Organization Meetings: Never     Marital Status:    Housing Stability: Low Risk  (7/10/2023)    Housing Stability Vital Sign     Unable to Pay for Housing in the Last Year: No     Number of Places Lived in the Last Year: 1     Unstable Housing in the Last Year: No     Review of patient's allergies indicates:   Allergen Reactions    Codeine Nausea And Vomiting    Crestor [rosuvastatin] Other (See Comments)     Muscle weakness    Fosamax [alendronate] Other (See Comments)     Didn't tolerate over 10 years ago and doesn't remember what happened.     Livalo [pitavastatin] Other (See Comments)     Muscle pain    Simvastatin Other (See Comments)     Leg pains/ weakness     Damari Grant had no medications administered during this visit.    Review of Systems   Constitutional:  Negative for activity change and unexpected weight change.   HENT:  Negative for hearing loss, rhinorrhea and trouble swallowing.    Eyes:  Negative for discharge and visual disturbance.   Respiratory:  Negative for chest  tightness and wheezing.    Cardiovascular:  Negative for chest pain and palpitations.   Gastrointestinal:  Negative for blood in stool, constipation, diarrhea and vomiting.   Endocrine: Negative for polydipsia and polyuria.   Genitourinary:  Negative for difficulty urinating, dysuria, hematuria and menstrual problem.   Musculoskeletal:  Negative for arthralgias, joint swelling and neck pain.   Neurological:  Negative for weakness and headaches.   Psychiatric/Behavioral:  Negative for confusion and dysphoric mood.          Objective:      Vitals:    01/05/24 0946   BP: 138/80   Pulse: 89   Resp: 18   Temp: 97.2 °F (36.2 °C)      Physical Exam  Vitals reviewed.   Constitutional:       General: She is not in acute distress.     Appearance: Normal appearance.   HENT:      Head: Normocephalic and atraumatic.      Comments: Facial features are symmetric      Nose: Nose normal. No congestion or rhinorrhea.      Mouth/Throat:      Mouth: Mucous membranes are moist.      Pharynx: Oropharynx is clear. No oropharyngeal exudate or posterior oropharyngeal erythema.   Eyes:      General: No scleral icterus.     Extraocular Movements: Extraocular movements intact.      Conjunctiva/sclera: Conjunctivae normal.   Cardiovascular:      Rate and Rhythm: Normal rate and regular rhythm.      Pulses: Normal pulses.      Heart sounds: Normal heart sounds.   Pulmonary:      Effort: Pulmonary effort is normal. No respiratory distress.      Breath sounds: Normal breath sounds.   Musculoskeletal:         General: No deformity or signs of injury. Normal range of motion.      Cervical back: Normal range of motion.      Comments: Gait normal    Skin:     General: Skin is warm and dry.      Findings: No rash.   Neurological:      General: No focal deficit present.      Mental Status: She is alert and oriented to person, place, and time. Mental status is at baseline.   Psychiatric:         Mood and Affect: Mood normal.         Behavior: Behavior  normal.         Thought Content: Thought content normal.       Current Outpatient Medications on File Prior to Visit   Medication Sig Dispense Refill    acetaminophen (TYLENOL) 500 MG tablet Take 500 mg by mouth as needed.      ELIQUIS 5 mg Tab TAKE ONE BY MOUTH TWICE DAILY 60 tablet 11    furosemide (LASIX) 20 MG tablet Take 1 tablet (20 mg total) by mouth as needed (Swelling). 30 tablet 11    levalbuterol (XOPENEX) 1.25 mg/3 mL nebulizer solution Take 3 mLs (1.25 mg total) by nebulization every 4 (four) hours as needed for Wheezing. Rescue 90 each 11    levothyroxine (SYNTHROID) 25 MCG tablet TAKE ONE DAILY BEFORE BREAKFAST 90 tablet 0    metoprolol succinate (TOPROL-XL) 50 MG 24 hr tablet TAKE (1/2) HALF BY MOUTH DAILY 45 tablet 3    REPATHA SURECLICK 140 mg/mL PnIj INJECT 1 ML (140 MG TOTAL) INTO THE SKIN EVERY 14 (FOURTEEN) DAYS. 6 mL 3    spironolactone (ALDACTONE) 25 MG tablet TAKE 0.5 TABLETS (12.5 MG TOTAL) BY MOUTH 2 (TWO) TIMES DAILY WITH MEALS. (Patient taking differently: Take 12.5 mg by mouth 2 (two) times daily with meals.) 90 tablet 3    VITAMIN D2 1,250 mcg (50,000 unit) capsule TAKE 1 CAPSULE (50,000 UNITS TOTAL) BY MOUTH EVERY 7 DAYS. 12 capsule 5    amoxicillin (AMOXIL) 500 MG Tab Take 4 tablets (2,000 mg total) by mouth as needed (4 tablets 1 hour prior to dental appt). Take 4 tablets 1 hour prior to dental appt (Patient not taking: Reported on 1/5/2024) 4 tablet 3     No current facility-administered medications on file prior to visit.         Assessment:       1. Physical exam, annual    2. Chronic obstructive pulmonary disease, unspecified COPD type    3. Chronic diastolic heart failure    4. Chronic atrial fibrillation    5. Hemiplegia affecting right dominant side, unspecified etiology, unspecified hemiplegia type    6. Atherosclerosis of aorta    7. BOOP (bronchiolitis obliterans with organizing pneumonia)        Plan:       Physical exam, annual   - annual screening labs discussed      Chronic obstructive pulmonary disease, unspecified COPD type  BOOP (bronchiolitis obliterans with organizing pneumonia)   - not currently under exacerbation; continue p.r.n. treatment     Chronic diastolic heart failure  Chronic atrial fibrillation  Atherosclerosis of aorta   - will continue to follow along with Cardiology and aggressively intervene upon modifiable risk factors    Hemiplegia affecting right dominant side, unspecified etiology, unspecified hemiplegia type   - doing very well    Return to clinic in one year for annual exam or sooner if dictated by labs or illness.

## 2024-01-05 ENCOUNTER — OFFICE VISIT (OUTPATIENT)
Dept: INTERNAL MEDICINE | Facility: CLINIC | Age: 89
End: 2024-01-05
Payer: MEDICARE

## 2024-01-05 VITALS
TEMPERATURE: 97 F | HEART RATE: 89 BPM | SYSTOLIC BLOOD PRESSURE: 138 MMHG | OXYGEN SATURATION: 96 % | HEIGHT: 65 IN | BODY MASS INDEX: 30.16 KG/M2 | RESPIRATION RATE: 18 BRPM | DIASTOLIC BLOOD PRESSURE: 80 MMHG | WEIGHT: 181 LBS

## 2024-01-05 DIAGNOSIS — I70.0 ATHEROSCLEROSIS OF AORTA: ICD-10-CM

## 2024-01-05 DIAGNOSIS — G81.91 HEMIPLEGIA AFFECTING RIGHT DOMINANT SIDE, UNSPECIFIED ETIOLOGY, UNSPECIFIED HEMIPLEGIA TYPE: ICD-10-CM

## 2024-01-05 DIAGNOSIS — J44.9 CHRONIC OBSTRUCTIVE PULMONARY DISEASE, UNSPECIFIED COPD TYPE: ICD-10-CM

## 2024-01-05 DIAGNOSIS — I50.32 CHRONIC DIASTOLIC HEART FAILURE: ICD-10-CM

## 2024-01-05 DIAGNOSIS — J84.89 BOOP (BRONCHIOLITIS OBLITERANS WITH ORGANIZING PNEUMONIA): ICD-10-CM

## 2024-01-05 DIAGNOSIS — Z00.00 PHYSICAL EXAM, ANNUAL: Primary | ICD-10-CM

## 2024-01-05 DIAGNOSIS — I48.20 CHRONIC ATRIAL FIBRILLATION: ICD-10-CM

## 2024-01-05 PROCEDURE — 99214 OFFICE O/P EST MOD 30 MIN: CPT | Mod: S$PBB,,, | Performed by: STUDENT IN AN ORGANIZED HEALTH CARE EDUCATION/TRAINING PROGRAM

## 2024-01-05 PROCEDURE — 99213 OFFICE O/P EST LOW 20 MIN: CPT | Mod: PBBFAC,PO | Performed by: STUDENT IN AN ORGANIZED HEALTH CARE EDUCATION/TRAINING PROGRAM

## 2024-01-05 PROCEDURE — 99999 PR PBB SHADOW E&M-EST. PATIENT-LVL III: CPT | Mod: PBBFAC,,, | Performed by: STUDENT IN AN ORGANIZED HEALTH CARE EDUCATION/TRAINING PROGRAM

## 2024-01-18 RX ORDER — NYSTATIN AND TRIAMCINOLONE ACETONIDE 100000; 1 [USP'U]/G; MG/G
OINTMENT TOPICAL
Qty: 60 G | Refills: 1 | Status: SHIPPED | OUTPATIENT
Start: 2024-01-18

## 2024-01-26 RX ORDER — METOPROLOL SUCCINATE 50 MG/1
TABLET, EXTENDED RELEASE ORAL
Qty: 45 TABLET | Refills: 3 | Status: SHIPPED | OUTPATIENT
Start: 2024-01-26 | End: 2024-05-29

## 2024-01-29 DIAGNOSIS — E78.00 PURE HYPERCHOLESTEROLEMIA: ICD-10-CM

## 2024-01-29 DIAGNOSIS — Z95.1 HISTORY OF CORONARY ARTERY BYPASS GRAFT: ICD-10-CM

## 2024-01-29 DIAGNOSIS — Z78.9 STATIN INTOLERANCE: ICD-10-CM

## 2024-01-29 RX ORDER — EVOLOCUMAB 140 MG/ML
INJECTION, SOLUTION SUBCUTANEOUS
Qty: 6 ML | Refills: 3 | Status: SHIPPED | OUTPATIENT
Start: 2024-01-29

## 2024-02-26 ENCOUNTER — PATIENT MESSAGE (OUTPATIENT)
Dept: INTERNAL MEDICINE | Facility: CLINIC | Age: 89
End: 2024-02-26
Payer: MEDICARE

## 2024-02-26 ENCOUNTER — E-VISIT (OUTPATIENT)
Dept: INTERNAL MEDICINE | Facility: CLINIC | Age: 89
End: 2024-02-26
Payer: MEDICARE

## 2024-02-26 ENCOUNTER — TELEPHONE (OUTPATIENT)
Dept: INTERNAL MEDICINE | Facility: CLINIC | Age: 89
End: 2024-02-26

## 2024-02-26 DIAGNOSIS — R35.0 URINARY FREQUENCY: Primary | ICD-10-CM

## 2024-02-26 PROCEDURE — 99421 OL DIG E/M SVC 5-10 MIN: CPT | Mod: ,,, | Performed by: STUDENT IN AN ORGANIZED HEALTH CARE EDUCATION/TRAINING PROGRAM

## 2024-02-26 NOTE — TELEPHONE ENCOUNTER
I spoke with pts daughter , she will  specimen cup.  also suggested she direct future UTI concerns to recently establish urologist , dean

## 2024-02-26 NOTE — PROGRESS NOTES
Patient ID: Damari Grant is a 89 y.o. female.    Chief Complaint: Urinary Tract Infection (Entered automatically based on patient selection in Patient Portal.)    The patient initiated a request through [employer] on 2/26/2024 for evaluation and management with a chief complaint of Urinary Tract Infection (Entered automatically based on patient selection in Patient Portal.)     I evaluated the questionnaire submission on 02/26/2024 .    [facility] Peq Evisit Uti Questionnaire    2/26/2024  1:04 PM CST - Filed by Patient   Do you agree to participate in an E-Visit? Yes   If you have any of the following problems, please present to your local ER or call 911:  I acknowledge   What is the main issue that you would like for your doctor to address today? burning  after urinating not every time   Are you able to take your vital signs? Yes   Systolic Blood Pressure: 135   Diastolic Blood Pressure: 80   Weight: 173   Height: 60   Pulse: 84   Temperature: 98   Respiration rate:    Pulse Oxygen: 90   What symptoms do you currently have? Pain while passing urine   When did your symptoms first appear? 2/19/2024   List what you have done or taken to help your symptoms. azo & cranberry juice   Please indicate whether you have had the following symptoms during the past 24 hours     Urgent urination (a sudden and uncontrollable urge to urinate) None   Frequent urination of small amounts of urine (going to the toilet very often) None   Burning pain when urinating Mild   Incomplete bladder emptying (still feel like you need to urinate again after urination) None   Pain not associated with urination in the lower abdomen below the belly button) None   What does your urine look like? Clear   Blood seen in the urine None   Flank pain (pain in one or both sides of the lower back) None   Abnormal Vaginal Discharge (abnormal amount, color and/or odor) None   Discharge from the urethra (urinary opening) without urination None   High body  temperature/fever? None-<99.5   Please rate how much discomfort you have experience because of the symptoms in the past 24 hours: Mild   Please indicate how the symptoms have interfered with your every day activities/work in the past 24 hours: None   Please indicate how these symptoms have interfered with your social activities (visiting people, meeting with friends, etc.) in the past 24 hours? None   Are you a diabetic? No   Please indicate whether you have the following at the time of completion of this questionnaire: None of the above   Provide any information you feel is important to your history not asked above Nothing to note   Please attach any relevant images or files (if you have performed a home test for UTI, please submit a photo of results)          Encounter Diagnosis   Name Primary?    Urinary frequency Yes        Orders Placed This Encounter   Procedures    Urine culture     Standing Status:   Future     Standing Expiration Date:   8/26/2025    Urinalysis     Standing Status:   Future     Standing Expiration Date:   8/26/2025     Order Specific Question:   Collection Type     Answer:   Urine, Clean Catch            No follow-ups on file.      E-Visit Time Tracking:    Day 1 Time (in minutes): 7    Total Time (in minutes): 7

## 2024-02-26 NOTE — TELEPHONE ENCOUNTER
----- Message from Milo Hogan MD sent at 2/26/2024  1:15 PM CST -----  Please facilitate urinalysis and culture collection.  Will await urinalysis results to consider empiric antibiotic prescribing (history of numerous negative cultures).  Would also suggest she direct future UTI concerns to recently establish urologist    Thank you for your time.

## 2024-02-26 NOTE — Clinical Note
Please facilitate urinalysis and culture collection.  Will await urinalysis results to consider empiric antibiotic prescribing (history of numerous negative cultures).  Would also suggest she direct future UTI concerns to recently establish urologist  Thank you for your time.

## 2024-02-26 NOTE — TELEPHONE ENCOUNTER
Annual 1-5-24    Pt is reporting some burning with urination.  I sent her an E consult to complete

## 2024-02-27 ENCOUNTER — LAB VISIT (OUTPATIENT)
Dept: LAB | Facility: HOSPITAL | Age: 89
End: 2024-02-27
Attending: STUDENT IN AN ORGANIZED HEALTH CARE EDUCATION/TRAINING PROGRAM
Payer: MEDICARE

## 2024-02-27 DIAGNOSIS — R35.0 URINARY FREQUENCY: ICD-10-CM

## 2024-02-27 PROCEDURE — 81003 URINALYSIS AUTO W/O SCOPE: CPT | Performed by: STUDENT IN AN ORGANIZED HEALTH CARE EDUCATION/TRAINING PROGRAM

## 2024-02-27 PROCEDURE — 87086 URINE CULTURE/COLONY COUNT: CPT | Performed by: STUDENT IN AN ORGANIZED HEALTH CARE EDUCATION/TRAINING PROGRAM

## 2024-02-28 LAB
BACTERIA UR CULT: NORMAL
BACTERIA UR CULT: NORMAL

## 2024-03-08 RX ORDER — APIXABAN 5 MG/1
TABLET, FILM COATED ORAL
Qty: 60 TABLET | Refills: 11 | Status: SHIPPED | OUTPATIENT
Start: 2024-03-08

## 2024-03-15 RX ORDER — LEVOTHYROXINE SODIUM 25 UG/1
TABLET ORAL
Qty: 90 TABLET | Refills: 3 | Status: SHIPPED | OUTPATIENT
Start: 2024-03-15

## 2024-03-15 NOTE — TELEPHONE ENCOUNTER
Refill Decision Note   Damari Grant  is requesting a refill authorization.  Brief Assessment and Rationale for Refill:  Approve     Medication Therapy Plan:        Comments:     Note composed:4:32 PM 03/15/2024

## 2024-03-15 NOTE — TELEPHONE ENCOUNTER
Care Due:                  Date            Visit Type   Department     Provider  --------------------------------------------------------------------------------                                MYCHART                              ANNUAL                              CHECKUP/PHY  [facility] INTERNAL  Last Visit: 01-      [facility]       Milo Hogan  Next Visit: None Scheduled  None         None Found                                                            Last  Test          Frequency    Reason                     Performed    Due Date  --------------------------------------------------------------------------------    Vitamin D...  12 months..  VITAMIN..................  12- 12-    [facility] Embedded Care Due Messages. Reference number: [id number].   3/15/2024 2:46:16 PM CDT

## 2024-03-15 NOTE — TELEPHONE ENCOUNTER
No care due was identified.  [facility] Embedded Care Due Messages. Reference number: [id number].   3/15/2024 2:47:06 PM CDT

## 2024-03-18 RX ORDER — ERGOCALCIFEROL 1.25 1/1
CAPSULE ORAL
Qty: 12 CAPSULE | Refills: 3 | Status: SHIPPED | OUTPATIENT
Start: 2024-03-18

## 2024-05-08 ENCOUNTER — PATIENT MESSAGE (OUTPATIENT)
Dept: CARDIOLOGY | Facility: CLINIC | Age: 89
End: 2024-05-08
Payer: MEDICARE

## 2024-05-08 DIAGNOSIS — I48.20 CHRONIC ATRIAL FIBRILLATION: Primary | Chronic | ICD-10-CM

## 2024-05-09 ENCOUNTER — HOSPITAL ENCOUNTER (OUTPATIENT)
Dept: CARDIOLOGY | Facility: HOSPITAL | Age: 89
Discharge: HOME | End: 2024-05-09
Attending: INTERNAL MEDICINE
Payer: MEDICARE

## 2024-05-09 VITALS
HEART RATE: 80 BPM | DIASTOLIC BLOOD PRESSURE: 70 MMHG | HEIGHT: 65 IN | BODY MASS INDEX: 30.16 KG/M2 | SYSTOLIC BLOOD PRESSURE: 130 MMHG | WEIGHT: 181 LBS

## 2024-05-09 DIAGNOSIS — I50.32 CHRONIC DIASTOLIC HEART FAILURE: Chronic | ICD-10-CM

## 2024-05-09 DIAGNOSIS — Z95.2 S/P TAVR (TRANSCATHETER AORTIC VALVE REPLACEMENT): Chronic | ICD-10-CM

## 2024-05-09 LAB
ASCENDING AORTA: 2.71 CM
AV INDEX (PROSTH): 0.34
AV MEAN GRADIENT: 13 MMHG
AV PEAK GRADIENT: 25 MMHG
AV VALVE AREA BY VELOCITY RATIO: 1.37 CM²
AV VALVE AREA: 1.54 CM²
AV VELOCITY RATIO: 0.3
BSA FOR ECHO PROCEDURE: 1.94 M2
CV ECHO LV RWT: 0.46 CM
DOP CALC AO PEAK VEL: 2.48 M/S
DOP CALC AO VTI: 54.82 CM
DOP CALC LVOT AREA: 4.5 CM2
DOP CALC LVOT DIAMETER: 2.4 CM
DOP CALC LVOT PEAK VEL: 0.75 M/S
DOP CALC LVOT STROKE VOLUME: 84.46 CM3
DOP CALC MV VTI: 35.23 CM
DOP CALCLVOT PEAK VEL VTI: 18.68 CM
E/E' RATIO: 24.15 M/S
ECHO LV POSTERIOR WALL: 1 CM (ref 0.6–1.1)
FRACTIONAL SHORTENING: 31 % (ref 28–44)
HR MV ECHO: 80 BPM
INTERVENTRICULAR SEPTUM: 1.05 CM (ref 0.6–1.1)
LA MAJOR: 5.92 CM
LA MINOR: 5.37 CM
LA WIDTH: 3.87 CM
LEFT ATRIUM SIZE: 5.01 CM
LEFT ATRIUM VOLUME INDEX MOD: 26.8 ML/M2
LEFT ATRIUM VOLUME INDEX: 48.8 ML/M2
LEFT ATRIUM VOLUME MOD: 51 CM3
LEFT ATRIUM VOLUME: 92.81 CM3
LEFT INTERNAL DIMENSION IN SYSTOLE: 3 CM (ref 2.1–4)
LEFT VENTRICLE DIASTOLIC VOLUME INDEX: 44.27 ML/M2
LEFT VENTRICLE DIASTOLIC VOLUME: 84.12 ML
LEFT VENTRICLE MASS INDEX: 78 G/M2
LEFT VENTRICLE SYSTOLIC VOLUME INDEX: 18.4 ML/M2
LEFT VENTRICLE SYSTOLIC VOLUME: 34.96 ML
LEFT VENTRICULAR INTERNAL DIMENSION IN DIASTOLE: 4.32 CM (ref 3.5–6)
LEFT VENTRICULAR MASS: 148.57 G
LV LATERAL E/E' RATIO: 22.43 M/S
LV SEPTAL E/E' RATIO: 26.17 M/S
MV A" WAVE DURATION": 13.42 MSEC
MV MEAN GRADIENT: 4 MMHG
MV PEAK E VEL: 1.57 M/S
MV PEAK GRADIENT: 11 MMHG
MV VALVE AREA BY CONTINUITY EQUATION: 2.4 CM2
OHS CV RV/LV RATIO: 0.86 CM
PISA MRMAX VEL: 0.06 M/S
PISA TR MAX VEL: 4.35 M/S
PULM VEIN S/D RATIO: 0.76
PV PEAK D VEL: 0.38 M/S
PV PEAK S VEL: 0.29 M/S
RA MAJOR: 5.22 CM
RA PRESSURE ESTIMATED: 8 MMHG
RA WIDTH: 3.22 CM
RIGHT VENTRICULAR END-DIASTOLIC DIMENSION: 3.7 CM
RV TB RVSP: 12 MMHG
SINUS: 2.19 CM
STJ: 2.27 CM
TDI LATERAL: 0.07 M/S
TDI SEPTAL: 0.06 M/S
TDI: 0.07 M/S
TR MAX PG: 76 MMHG
TRICUSPID ANNULAR PLANE SYSTOLIC EXCURSION: 1.2 CM
TV REST PULMONARY ARTERY PRESSURE: 84 MMHG
Z-SCORE OF LEFT VENTRICULAR DIMENSION IN END DIASTOLE: -2.1
Z-SCORE OF LEFT VENTRICULAR DIMENSION IN END SYSTOLE: -0.72

## 2024-05-09 PROCEDURE — 93306 TTE W/DOPPLER COMPLETE: CPT

## 2024-05-09 PROCEDURE — 93306 TTE W/DOPPLER COMPLETE: CPT | Mod: 26,,, | Performed by: INTERNAL MEDICINE

## 2024-05-21 ENCOUNTER — CLINICAL SUPPORT (OUTPATIENT)
Dept: CARDIOLOGY | Facility: HOSPITAL | Age: 89
End: 2024-05-21
Attending: INTERNAL MEDICINE
Payer: MEDICARE

## 2024-05-21 ENCOUNTER — PATIENT MESSAGE (OUTPATIENT)
Dept: CARDIOLOGY | Facility: CLINIC | Age: 89
End: 2024-05-21

## 2024-05-21 DIAGNOSIS — I48.20 CHRONIC ATRIAL FIBRILLATION: Chronic | ICD-10-CM

## 2024-05-21 PROCEDURE — 93227 XTRNL ECG REC<48 HR R&I: CPT | Mod: ,,, | Performed by: INTERNAL MEDICINE

## 2024-05-21 PROCEDURE — 93225 XTRNL ECG REC<48 HRS REC: CPT

## 2024-05-22 ENCOUNTER — PATIENT MESSAGE (OUTPATIENT)
Dept: CARDIOLOGY | Facility: CLINIC | Age: 89
End: 2024-05-22
Payer: MEDICARE

## 2024-05-22 ENCOUNTER — PATIENT MESSAGE (OUTPATIENT)
Dept: INTERNAL MEDICINE | Facility: CLINIC | Age: 89
End: 2024-05-22
Payer: MEDICARE

## 2024-05-23 DIAGNOSIS — I50.32 CHRONIC DIASTOLIC HEART FAILURE: ICD-10-CM

## 2024-05-23 DIAGNOSIS — I10 ESSENTIAL HYPERTENSION: Chronic | ICD-10-CM

## 2024-05-23 RX ORDER — SPIRONOLACTONE 25 MG/1
25 TABLET ORAL DAILY
Qty: 90 TABLET | Refills: 3 | Status: SHIPPED | OUTPATIENT
Start: 2024-05-23 | End: 2025-05-23

## 2024-05-23 NOTE — TELEPHONE ENCOUNTER
I spoke to pt's daughter Payal, she was notified Dr. Titus is recommending an appt to discuss pt's anxiety.  Pt is having some testing done now from her cardiologist She will call back to schedule a visit.

## 2024-05-24 ENCOUNTER — PATIENT MESSAGE (OUTPATIENT)
Dept: CARDIOLOGY | Facility: CLINIC | Age: 89
End: 2024-05-24
Payer: MEDICARE

## 2024-05-27 ENCOUNTER — PATIENT MESSAGE (OUTPATIENT)
Dept: CARDIOLOGY | Facility: CLINIC | Age: 89
End: 2024-05-27
Payer: MEDICARE

## 2024-05-27 LAB
OHS CV EVENT MONITOR DAY: 0
OHS CV HOLTER LENGTH DECIMAL HOURS: 48
OHS CV HOLTER LENGTH HOURS: 48
OHS CV HOLTER LENGTH MINUTES: 0
OHS CV HOLTER SINUS AVERAGE HR: 96
OHS CV HOLTER SINUS MAX HR: 148
OHS CV HOLTER SINUS MIN HR: 55

## 2024-05-29 ENCOUNTER — OFFICE VISIT (OUTPATIENT)
Dept: CARDIOLOGY | Facility: CLINIC | Age: 89
End: 2024-05-29
Payer: MEDICARE

## 2024-05-29 VITALS
HEART RATE: 93 BPM | BODY MASS INDEX: 28.91 KG/M2 | SYSTOLIC BLOOD PRESSURE: 136 MMHG | WEIGHT: 173.75 LBS | DIASTOLIC BLOOD PRESSURE: 80 MMHG

## 2024-05-29 DIAGNOSIS — Z98.890 HISTORY OF LEFT COMMON CAROTID ARTERY STENT PLACEMENT: ICD-10-CM

## 2024-05-29 DIAGNOSIS — Z95.1 HISTORY OF CORONARY ARTERY BYPASS GRAFT: Chronic | ICD-10-CM

## 2024-05-29 DIAGNOSIS — I50.32 CHRONIC DIASTOLIC HEART FAILURE: Chronic | ICD-10-CM

## 2024-05-29 DIAGNOSIS — Z95.828 HISTORY OF LEFT COMMON CAROTID ARTERY STENT PLACEMENT: ICD-10-CM

## 2024-05-29 DIAGNOSIS — I95.1 ORTHOSTATIC HYPOTENSION: Chronic | ICD-10-CM

## 2024-05-29 DIAGNOSIS — I27.20 PULMONARY HYPERTENSION: Primary | Chronic | ICD-10-CM

## 2024-05-29 DIAGNOSIS — I48.20 CHRONIC ATRIAL FIBRILLATION: Chronic | ICD-10-CM

## 2024-05-29 DIAGNOSIS — E78.00 PURE HYPERCHOLESTEROLEMIA: Chronic | ICD-10-CM

## 2024-05-29 DIAGNOSIS — I10 ESSENTIAL HYPERTENSION: Chronic | ICD-10-CM

## 2024-05-29 DIAGNOSIS — Z95.2 S/P TAVR (TRANSCATHETER AORTIC VALVE REPLACEMENT): Chronic | ICD-10-CM

## 2024-05-29 DIAGNOSIS — I35.0 NONRHEUMATIC AORTIC VALVE STENOSIS: Chronic | ICD-10-CM

## 2024-05-29 DIAGNOSIS — I65.23 BILATERAL CAROTID ARTERY STENOSIS: Chronic | ICD-10-CM

## 2024-05-29 PROCEDURE — 99213 OFFICE O/P EST LOW 20 MIN: CPT | Mod: PBBFAC | Performed by: INTERNAL MEDICINE

## 2024-05-29 PROCEDURE — 99214 OFFICE O/P EST MOD 30 MIN: CPT | Mod: S$PBB,,, | Performed by: INTERNAL MEDICINE

## 2024-05-29 PROCEDURE — 99999 PR PBB SHADOW E&M-EST. PATIENT-LVL III: CPT | Mod: PBBFAC,,, | Performed by: INTERNAL MEDICINE

## 2024-05-29 RX ORDER — METOPROLOL SUCCINATE 50 MG/1
50 TABLET, EXTENDED RELEASE ORAL NIGHTLY
Qty: 90 TABLET | Refills: 3 | Status: SHIPPED | OUTPATIENT
Start: 2024-05-29

## 2024-05-29 NOTE — PROGRESS NOTES
Subjective:   05/29/2024     Patient ID:  Damari Grant is a 89 y.o. female who presents for evaulation of Results and Follow-up        Comes in for cardiac follow-up.  She does have a history of diastolic heart failure, her shortness breath has not worsened.  She continues take spironolactone, blood pressure is well controlled, takes furosemide as needed for swelling or shortness of breath .  She had been tried on Jardiance, had urinary tract infections, did not tolerated.  Echocardiography now shows normal LV function with progressive pulmonary hypertension.    She does have coronary artery disease and is status post coronary artery bypass grafting.  She had aortic valve stenosis in his status post TAVR.    Chronic atrial fibrillation is noted, currently anticoagulated with Eliquis, no bleeding, dose appropriate.    Holter monitor shows increased heart rate    Hypercholesterolemia is present with intolerance statin therapy, continues on PCSK9 inhibitor therapy.  Last LDL was 30.    Blood pressure at home is very well controlled    Prior visit:   She comes in now for follow-up of shortness of breath.  On previous visit, Jardiance 5 mg daily was added, but not tolerated due to UTIs; echocardiography does show normal TAVR valve function.  Pulmonary hypertension is present, probably secondary to diastolic dysfunction.   Spironolactone had added to her medications.  She has lost weight since then and, fluid.  She was seen in Pulmonary by Dr. Cardenas, her chest x-ray had cleared which she felt was probably due to treatment of her diastolic heart failure.  Her shortness breath has improved.  She is not having chest pains or tightness.  She has not had edema.    In June of 2020 she had a TIA and underwent a left carotid stent placement.  She subsequently underwent a TAVR for severe symptomatic aortic valve stenosis.  Echocardiography in August of 2021 shows left ventricular function to be normal.  The peak pulmonary  artery systolic pressure was 40 mm Hg and there was moderate mitral regurgitation.  Left atrium appeared to be moderately dilated consistent with diastolic dysfunction, in atrial fibrillation though.  Laboratory work showed normal CBC and complete metabolic profile.  The calculated aortic valve area was 1.9 centimeter squared, mean aortic valve gradient 6 mm Hg.    Coronary angiography from June of 2020 showed a 50% left main stenosis.  The LAD had an 80% origin stenosis and 90% proximal stenosis.  Circumflex had a 70% stenosis with severe disease in the 1st and 2nd marginal branches.  The right coronary artery had a 70% stenosis immediately proximal to the origin of the streak PDA which gave a small component of flow into the interventricular septum.  Strict PDA has a 70% origin stenosis.  PDA was small and had not been bypassed.  The left intermammary graft to the LAD was patent free of disease.  Saphenous vein graft to the diagonal was free of disease.  Saphenous vein graft to the 1st marginal was patent and free of disease.  Saphenous vein graft was grafted side-to-side to the 2nd marginal branch in end-to-side therapy marginal branch free of disease.  She was felt to have coronary artery disease medically treated.  She subsequently underwent carotid stenting and TAVR.    She does have chronic atrial fibrillation.  Her heart rate has been well controlled.  She has been anticoagulated with Eliquis.  She has not had bleeding problems.  She was also on aspirin, that has been discontinued    Hypercholesterolemia has been treated with PCSK9 inhibitor therapy.  She had been intolerant to statin therapy.  Currently on PCSK9 inhibitor every 2 weeks, last LDL 81.    She does have a history of iron deficiency, the last iron level was normal.  Last CBC showed no anemia      Shortness of Breath  Associated symptoms include leg swelling. Pertinent negatives include no chest pain, claudication, orthopnea, PND or syncope.    Atrial Fibrillation  Symptoms include shortness of breath. Symptoms are negative for chest pain, palpitations and syncope. Past medical history includes atrial fibrillation and CHF.   Hypertension  Associated symptoms include malaise/fatigue and shortness of breath. Pertinent negatives include no chest pain, orthopnea, palpitations or PND.   Congestive Heart Failure  Associated symptoms include shortness of breath. Pertinent negatives include no chest pain, claudication, near-syncope or palpitations.   Follow-up  Pertinent negatives include no chest pain.       Patient Active Problem List   Diagnosis    Neuropathy    Hernia of abdominal wall    BOOP (bronchiolitis obliterans with organizing pneumonia)    Essential hypertension    Other constipation    Carotid artery disease    Nonrheumatic aortic valve stenosis    Chronic fatigue    Atherosclerosis of aorta    History of syncope    Risk for falls    Sensorineural hearing loss (SNHL) of both ears    Chronic atrial fibrillation    Arteriosclerosis of coronary artery    Pulmonary hypertension    History of left common carotid artery stent placement    S/P TAVR (transcatheter aortic valve replacement)    History of coronary artery bypass graft    Anticoagulation management encounter    Orthostatic hypotension    Pure hypercholesterolemia    Statin intolerance    Acquired hypothyroidism    Chronic diastolic heart failure    Iron deficiency    Overweight    Hemiplegia affecting right dominant side, unspecified etiology, unspecified hemiplegia type    Chronic obstructive pulmonary disease, unspecified COPD type        Review of patient's allergies indicates:   Allergen Reactions    Codeine Nausea And Vomiting    Crestor [rosuvastatin] Other (See Comments)     Muscle weakness    Fosamax [alendronate] Other (See Comments)     Didn't tolerate over 10 years ago and doesn't remember what happened.     Livalo [pitavastatin] Other (See Comments)     Muscle pain    Simvastatin  Other (See Comments)     Leg pains/ weakness         Current Outpatient Medications:     acetaminophen (TYLENOL) 500 MG tablet, Take 500 mg by mouth as needed., Disp: , Rfl:     amoxicillin (AMOXIL) 500 MG Tab, Take 4 tablets (2,000 mg total) by mouth as needed (4 tablets 1 hour prior to dental appt). Take 4 tablets 1 hour prior to dental appt, Disp: 4 tablet, Rfl: 3    ELIQUIS 5 mg Tab, TAKE ONE BY MOUTH TWICE DAILY, Disp: 60 tablet, Rfl: 11    ergocalciferol (VITAMIN D2) 50,000 unit Cap, TAKE 1 CAPSULE (50,000 UNITS TOTAL) BY MOUTH EVERY 7 DAYS., Disp: 12 capsule, Rfl: 3    levothyroxine (SYNTHROID) 25 MCG tablet, TAKE ONE DAILY BEFORE BREAKFAST, Disp: 90 tablet, Rfl: 3    nystatin-triamcinolone (MYCOLOG) ointment, APPLY TO AFFECTED AREA TWICE DAILY AS NEEDED, Disp: 60 g, Rfl: 1    REPATHA SURECLICK 140 mg/mL PnIj, INJECT 1 ML (140 MG TOTAL) INTO THE SKIN EVERY 14 (FOURTEEN) DAYS., Disp: 6 mL, Rfl: 3    spironolactone (ALDACTONE) 25 MG tablet, Take 1 tablet (25 mg total) by mouth once daily., Disp: 90 tablet, Rfl: 3    empagliflozin (JARDIANCE) 10 mg tablet, 1/2 tab daily, Disp: 30 tablet, Rfl: 11    furosemide (LASIX) 20 MG tablet, Take 1 tablet (20 mg total) by mouth as needed (Swelling)., Disp: 30 tablet, Rfl: 11    levalbuterol (XOPENEX) 1.25 mg/3 mL nebulizer solution, Take 3 mLs (1.25 mg total) by nebulization every 4 (four) hours as needed for Wheezing. Rescue, Disp: 90 each, Rfl: 11    metoprolol succinate (TOPROL-XL) 50 MG 24 hr tablet, Take 1 tablet (50 mg total) by mouth every evening., Disp: 90 tablet, Rfl: 3     Objective:   Review of Systems   Constitutional: Positive for malaise/fatigue.   Cardiovascular:  Positive for dyspnea on exertion and leg swelling. Negative for chest pain, claudication, cyanosis, irregular heartbeat, near-syncope, orthopnea, palpitations, paroxysmal nocturnal dyspnea and syncope.   Respiratory:  Positive for shortness of breath.        Vitals:    05/29/24 1028   BP: 136/80    Pulse: 93       Physical Exam  Vitals reviewed.   Constitutional:       General: She is not in acute distress.     Appearance: She is well-developed.   HENT:      Head: Normocephalic and atraumatic.      Nose: Nose normal.   Eyes:      Conjunctiva/sclera: Conjunctivae normal.      Pupils: Pupils are equal, round, and reactive to light.   Neck:      Vascular: No hepatojugular reflux or JVD.      Comments: Jugular venous pressure is normal  Cardiovascular:      Rate and Rhythm: Normal rate. Rhythm irregular.      Pulses: Intact distal pulses.      Heart sounds: Murmur (Grade 1 systolic ejection murmur) heard.      No friction rub. No gallop.   Pulmonary:      Effort: Pulmonary effort is normal. No respiratory distress.      Breath sounds: No wheezing or rales.   Chest:      Chest wall: No tenderness.   Abdominal:      General: Bowel sounds are normal. There is no distension.      Palpations: Abdomen is soft.      Tenderness: There is no abdominal tenderness.   Musculoskeletal:         General: No tenderness or deformity. Normal range of motion.      Cervical back: Normal range of motion and neck supple.      Right lower leg: No edema.      Left lower leg: Edema (tr+) present.   Skin:     General: Skin is warm and dry.      Findings: No erythema or rash.   Neurological:      Mental Status: She is alert and oriented to person, place, and time.      Cranial Nerves: No cranial nerve deficit.      Motor: No abnormal muscle tone.      Coordination: Coordination normal.   Psychiatric:         Behavior: Behavior normal.         Thought Content: Thought content normal.         Judgment: Judgment normal.         Lab Results   Component Value Date     01/03/2024    K 4.1 01/03/2024     01/03/2024    CO2 23 01/03/2024    BUN 18 01/03/2024    CREATININE 0.8 01/03/2024     (H) 01/03/2024    HGBA1C 5.5 12/09/2022    MG 1.9 03/29/2022    AST 22 01/03/2024    ALT 16 01/03/2024    ALBUMIN 3.8 01/03/2024    PROT 7.2  01/03/2024    BILITOT 1.1 (H) 01/03/2024    WBC 3.41 (L) 01/03/2024    HGB 14.5 01/03/2024    HCT 46.2 01/03/2024    MCV 99 (H) 01/03/2024     01/03/2024    TSH 3.825 01/03/2024    CHOL 90 (L) 01/03/2024    HDL 41 01/03/2024    LDLCALC 29.8 (L) 01/03/2024    TRIG 96 01/03/2024     Assessment and Plan:     Pulmonary hypertension  Comments:  will try Jardiance again, 10 mg tablets 1/2 tablet daily    Chronic diastolic heart failure  Comments:  on spironolactone, begin Jardiance, volume status appears compensated  Orders:  -     metoprolol succinate (TOPROL-XL) 50 MG 24 hr tablet; Take 1 tablet (50 mg total) by mouth every evening.  Dispense: 90 tablet; Refill: 3  -     CBC Auto Differential; Future; Expected date: 05/29/2024  -     Basic Metabolic Panel; Future; Expected date: 05/29/2024  -     NT-Pro Natriuretic Peptide; Future; Expected date: 05/29/2024  -     empagliflozin (JARDIANCE) 10 mg tablet; 1/2 tab daily  Dispense: 30 tablet; Refill: 11    Pure hypercholesterolemia  Comments:  excellent on PCSK9 inhibitor    S/P TAVR (transcatheter aortic valve replacement)  Comments:  normal function    Orthostatic hypotension    Nonrheumatic aortic valve stenosis    History of left common carotid artery stent placement    History of coronary artery bypass graft    Essential hypertension  Comments:  well controlled    Chronic atrial fibrillation  Comments:  rate fast, increase metoprolol    Bilateral carotid artery stenosis       Lab today   Begin Jardiance    Follow up in about 4 weeks (around 6/26/2024).

## 2024-05-31 ENCOUNTER — PATIENT MESSAGE (OUTPATIENT)
Dept: CARDIOLOGY | Facility: CLINIC | Age: 89
End: 2024-05-31
Payer: MEDICARE

## 2024-05-31 DIAGNOSIS — I50.32 CHRONIC DIASTOLIC HEART FAILURE: Chronic | ICD-10-CM

## 2024-06-03 ENCOUNTER — PATIENT MESSAGE (OUTPATIENT)
Dept: CARDIOLOGY | Facility: CLINIC | Age: 89
End: 2024-06-03
Payer: MEDICARE

## 2024-07-05 ENCOUNTER — OFFICE VISIT (OUTPATIENT)
Dept: CARDIOLOGY | Facility: CLINIC | Age: 89
End: 2024-07-05
Payer: MEDICARE

## 2024-07-05 VITALS
BODY MASS INDEX: 28.95 KG/M2 | HEART RATE: 87 BPM | WEIGHT: 173.94 LBS | DIASTOLIC BLOOD PRESSURE: 81 MMHG | SYSTOLIC BLOOD PRESSURE: 139 MMHG

## 2024-07-05 DIAGNOSIS — I50.32 CHRONIC DIASTOLIC HEART FAILURE: Primary | Chronic | ICD-10-CM

## 2024-07-05 PROCEDURE — 99999 PR PBB SHADOW E&M-EST. PATIENT-LVL III: CPT | Mod: PBBFAC,,, | Performed by: INTERNAL MEDICINE

## 2024-07-05 PROCEDURE — 99213 OFFICE O/P EST LOW 20 MIN: CPT | Mod: PBBFAC | Performed by: INTERNAL MEDICINE

## 2024-07-05 NOTE — PROGRESS NOTES
Subjective:   07/05/2024     Patient ID:  Damari Grant is a 89 y.o. female who presents for evaulation of Follow-up        Comes in for cardiac follow-up.  She does have diastolic heart failure.  Echocardiography now shows normal LV function with progressive pulmonary hypertension.  On her last visit she would increasing shortness of breath.  Jardiance was added and she seems to have improved.    She does have coronary artery disease and is status post coronary artery bypass grafting.  She had aortic valve stenosis in his status post TAVR.    Chronic atrial fibrillation is noted, currently anticoagulated with Eliquis, no bleeding, dose appropriate.    Holter monitor shows increased heart rate    Hypercholesterolemia is present with intolerance statin therapy, continues on PCSK9 inhibitor therapy.  Last LDL was 30.    Blood pressure at home is very well controlled    Prior visit:   She comes in now for follow-up of shortness of breath.  On previous visit, Jardiance 5 mg daily was added, but not tolerated due to UTIs; echocardiography does show normal TAVR valve function.  Pulmonary hypertension is present, probably secondary to diastolic dysfunction.   Spironolactone had added to her medications.  She has lost weight since then and, fluid.  She was seen in Pulmonary by Dr. Cardenas, her chest x-ray had cleared which she felt was probably due to treatment of her diastolic heart failure.  Her shortness breath has improved.  She is not having chest pains or tightness.  She has not had edema.    In June of 2020 she had a TIA and underwent a left carotid stent placement.  She subsequently underwent a TAVR for severe symptomatic aortic valve stenosis.  Echocardiography in August of 2021 shows left ventricular function to be normal.  The peak pulmonary artery systolic pressure was 40 mm Hg and there was moderate mitral regurgitation.  Left atrium appeared to be moderately dilated consistent with diastolic dysfunction, in  atrial fibrillation though.  Laboratory work showed normal CBC and complete metabolic profile.  The calculated aortic valve area was 1.9 centimeter squared, mean aortic valve gradient 6 mm Hg.    Coronary angiography from June of 2020 showed a 50% left main stenosis.  The LAD had an 80% origin stenosis and 90% proximal stenosis.  Circumflex had a 70% stenosis with severe disease in the 1st and 2nd marginal branches.  The right coronary artery had a 70% stenosis immediately proximal to the origin of the streak PDA which gave a small component of flow into the interventricular septum.  Strict PDA has a 70% origin stenosis.  PDA was small and had not been bypassed.  The left intermammary graft to the LAD was patent free of disease.  Saphenous vein graft to the diagonal was free of disease.  Saphenous vein graft to the 1st marginal was patent and free of disease.  Saphenous vein graft was grafted side-to-side to the 2nd marginal branch in end-to-side therapy marginal branch free of disease.  She was felt to have coronary artery disease medically treated.  She subsequently underwent carotid stenting and TAVR.    She does have chronic atrial fibrillation.  Her heart rate has been well controlled.  She has been anticoagulated with Eliquis.  She has not had bleeding problems.  She was also on aspirin, that has been discontinued    Hypercholesterolemia has been treated with PCSK9 inhibitor therapy.  She had been intolerant to statin therapy.  Currently on PCSK9 inhibitor every 2 weeks, last LDL 81.    She does have a history of iron deficiency, the last iron level was normal.  Last CBC showed no anemia      Shortness of Breath  Associated symptoms include leg swelling. Pertinent negatives include no chest pain, claudication, orthopnea, PND or syncope.   Atrial Fibrillation  Symptoms include shortness of breath. Symptoms are negative for chest pain, palpitations and syncope. Past medical history includes CHF.    Hypertension  Associated symptoms include malaise/fatigue and shortness of breath. Pertinent negatives include no chest pain, orthopnea, palpitations or PND.   Congestive Heart Failure  Associated symptoms include shortness of breath. Pertinent negatives include no chest pain, claudication, near-syncope or palpitations.   Follow-up  Pertinent negatives include no chest pain.       Patient Active Problem List   Diagnosis    Neuropathy    Hernia of abdominal wall    BOOP (bronchiolitis obliterans with organizing pneumonia)    Essential hypertension    Other constipation    Carotid artery disease    Nonrheumatic aortic valve stenosis    Chronic fatigue    Atherosclerosis of aorta    History of syncope    Risk for falls    Sensorineural hearing loss (SNHL) of both ears    Chronic atrial fibrillation    Arteriosclerosis of coronary artery    Pulmonary hypertension    History of left common carotid artery stent placement    S/P TAVR (transcatheter aortic valve replacement)    History of coronary artery bypass graft    Anticoagulation management encounter    Orthostatic hypotension    Pure hypercholesterolemia    Statin intolerance    Acquired hypothyroidism    Chronic diastolic heart failure    Iron deficiency    Overweight    Hemiplegia affecting right dominant side, unspecified etiology, unspecified hemiplegia type    Chronic obstructive pulmonary disease, unspecified COPD type        Review of patient's allergies indicates:   Allergen Reactions    Codeine Nausea And Vomiting    Crestor [rosuvastatin] Other (See Comments)     Muscle weakness    Fosamax [alendronate] Other (See Comments)     Didn't tolerate over 10 years ago and doesn't remember what happened.     Livalo [pitavastatin] Other (See Comments)     Muscle pain    Simvastatin Other (See Comments)     Leg pains/ weakness         Current Outpatient Medications:     acetaminophen (TYLENOL) 500 MG tablet, Take 500 mg by mouth as needed., Disp: , Rfl:      amoxicillin (AMOXIL) 500 MG Tab, Take 4 tablets (2,000 mg total) by mouth as needed (4 tablets 1 hour prior to dental appt). Take 4 tablets 1 hour prior to dental appt, Disp: 4 tablet, Rfl: 3    ELIQUIS 5 mg Tab, TAKE ONE BY MOUTH TWICE DAILY, Disp: 60 tablet, Rfl: 11    empagliflozin (JARDIANCE) 10 mg tablet, Take 1 tablet (10 mg total) by mouth once daily., Disp: 30 tablet, Rfl: 11    ergocalciferol (VITAMIN D2) 50,000 unit Cap, TAKE 1 CAPSULE (50,000 UNITS TOTAL) BY MOUTH EVERY 7 DAYS., Disp: 12 capsule, Rfl: 3    levothyroxine (SYNTHROID) 25 MCG tablet, TAKE ONE DAILY BEFORE BREAKFAST, Disp: 90 tablet, Rfl: 3    metoprolol succinate (TOPROL-XL) 50 MG 24 hr tablet, Take 1 tablet (50 mg total) by mouth every evening., Disp: 90 tablet, Rfl: 3    nystatin-triamcinolone (MYCOLOG) ointment, APPLY TO AFFECTED AREA TWICE DAILY AS NEEDED, Disp: 60 g, Rfl: 1    REPATHA SURECLICK 140 mg/mL PnIj, INJECT 1 ML (140 MG TOTAL) INTO THE SKIN EVERY 14 (FOURTEEN) DAYS., Disp: 6 mL, Rfl: 3    spironolactone (ALDACTONE) 25 MG tablet, Take 1 tablet (25 mg total) by mouth once daily., Disp: 90 tablet, Rfl: 3    furosemide (LASIX) 20 MG tablet, Take 1 tablet (20 mg total) by mouth as needed (Swelling)., Disp: 30 tablet, Rfl: 11    levalbuterol (XOPENEX) 1.25 mg/3 mL nebulizer solution, Take 3 mLs (1.25 mg total) by nebulization every 4 (four) hours as needed for Wheezing. Rescue, Disp: 90 each, Rfl: 11     Objective:   Review of Systems   Constitutional: Positive for malaise/fatigue.   Cardiovascular:  Positive for dyspnea on exertion and leg swelling. Negative for chest pain, claudication, cyanosis, irregular heartbeat, near-syncope, orthopnea, palpitations, paroxysmal nocturnal dyspnea and syncope.   Respiratory:  Positive for shortness of breath.        Vitals:    07/05/24 1033   BP: (!) 157/85   Pulse: 87       Physical Exam  Vitals reviewed.   Constitutional:       General: She is not in acute distress.     Appearance: She is  well-developed.   HENT:      Head: Normocephalic and atraumatic.      Nose: Nose normal.   Eyes:      Conjunctiva/sclera: Conjunctivae normal.      Pupils: Pupils are equal, round, and reactive to light.   Neck:      Vascular: No hepatojugular reflux or JVD.      Comments: Jugular venous pressure is normal  Cardiovascular:      Rate and Rhythm: Normal rate. Rhythm irregular.      Pulses: Intact distal pulses.      Heart sounds: Murmur (Grade 1 systolic ejection murmur) heard.      No friction rub. No gallop.   Pulmonary:      Effort: Pulmonary effort is normal. No respiratory distress.      Breath sounds: No wheezing or rales.   Chest:      Chest wall: No tenderness.   Abdominal:      General: Bowel sounds are normal. There is no distension.      Palpations: Abdomen is soft.      Tenderness: There is no abdominal tenderness.   Musculoskeletal:         General: No tenderness or deformity. Normal range of motion.      Cervical back: Normal range of motion and neck supple.      Right lower leg: No edema.      Left lower leg: Edema (tr+) present.   Skin:     General: Skin is warm and dry.      Findings: No erythema or rash.   Neurological:      Mental Status: She is alert and oriented to person, place, and time.      Cranial Nerves: No cranial nerve deficit.      Motor: No abnormal muscle tone.      Coordination: Coordination normal.   Psychiatric:         Behavior: Behavior normal.         Thought Content: Thought content normal.         Judgment: Judgment normal.         Lab Results   Component Value Date     05/29/2024    K 4.3 05/29/2024     05/29/2024    CO2 25 05/29/2024    BUN 14 05/29/2024    CREATININE 0.9 05/29/2024     (H) 05/29/2024    HGBA1C 5.5 12/09/2022    MG 1.9 03/29/2022    AST 22 01/03/2024    ALT 16 01/03/2024    ALBUMIN 3.8 01/03/2024    PROT 7.2 01/03/2024    BILITOT 1.1 (H) 01/03/2024    WBC 4.24 05/29/2024    HGB 14.9 05/29/2024    HCT 47.5 05/29/2024    MCV 94 05/29/2024      05/29/2024    TSH 3.825 01/03/2024    CHOL 90 (L) 01/03/2024    HDL 41 01/03/2024    LDLCALC 29.8 (L) 01/03/2024    TRIG 96 01/03/2024     Assessment and Plan:     Chronic diastolic heart failure  -     Basic Metabolic Panel; Future; Expected date: 07/05/2024      Appears improved clinically, would continue Jardiance and spironolactone, check BMP today.  Follow up in about 3 months (around 10/5/2024).

## 2024-07-19 ENCOUNTER — PATIENT MESSAGE (OUTPATIENT)
Dept: UROLOGY | Facility: CLINIC | Age: 89
End: 2024-07-19
Payer: MEDICARE

## 2024-07-19 ENCOUNTER — PATIENT MESSAGE (OUTPATIENT)
Dept: INTERNAL MEDICINE | Facility: CLINIC | Age: 89
End: 2024-07-19
Payer: MEDICARE

## 2024-07-19 NOTE — TELEPHONE ENCOUNTER
Pt is complaining of burning and pressure when urinating , she reached out to urology but was informed that provider Is out of office on today and referred her to pcp . She has been taking azo and systex . Ok to order urine?

## 2024-07-25 ENCOUNTER — PATIENT MESSAGE (OUTPATIENT)
Dept: CARDIOLOGY | Facility: CLINIC | Age: 89
End: 2024-07-25
Payer: MEDICARE

## 2024-07-30 DIAGNOSIS — Z00.00 ENCOUNTER FOR MEDICARE ANNUAL WELLNESS EXAM: ICD-10-CM

## 2024-08-08 ENCOUNTER — PATIENT MESSAGE (OUTPATIENT)
Dept: UROLOGY | Facility: CLINIC | Age: 89
End: 2024-08-08
Payer: MEDICARE

## 2024-08-12 NOTE — TELEPHONE ENCOUNTER
states pt was having an issue last week. She gave her Cystex and some AZO and discontinued Jardiance. Pt is doing better now, no complaints, per daughter. Offered to schedule a f/u with Dr. Caldera. States pt is doing well but if any other symptoms arise she will schedule via [employer] or send a message.

## 2024-08-20 ENCOUNTER — PATIENT MESSAGE (OUTPATIENT)
Dept: ADMINISTRATIVE | Facility: HOSPITAL | Age: 89
End: 2024-08-20
Payer: MEDICARE

## 2024-10-08 ENCOUNTER — OFFICE VISIT (OUTPATIENT)
Dept: CARDIOLOGY | Facility: CLINIC | Age: 89
End: 2024-10-08
Payer: MEDICARE

## 2024-10-08 VITALS
SYSTOLIC BLOOD PRESSURE: 138 MMHG | HEART RATE: 84 BPM | DIASTOLIC BLOOD PRESSURE: 84 MMHG | BODY MASS INDEX: 28.32 KG/M2 | WEIGHT: 170.19 LBS

## 2024-10-08 DIAGNOSIS — Z95.828 HISTORY OF LEFT COMMON CAROTID ARTERY STENT PLACEMENT: ICD-10-CM

## 2024-10-08 DIAGNOSIS — Z95.1 HISTORY OF CORONARY ARTERY BYPASS GRAFT: Chronic | ICD-10-CM

## 2024-10-08 DIAGNOSIS — Z95.2 S/P TAVR (TRANSCATHETER AORTIC VALVE REPLACEMENT): Chronic | ICD-10-CM

## 2024-10-08 DIAGNOSIS — I27.20 PULMONARY HYPERTENSION: Chronic | ICD-10-CM

## 2024-10-08 DIAGNOSIS — I65.23 BILATERAL CAROTID ARTERY STENOSIS: Chronic | ICD-10-CM

## 2024-10-08 DIAGNOSIS — I48.20 CHRONIC ATRIAL FIBRILLATION: Chronic | ICD-10-CM

## 2024-10-08 DIAGNOSIS — I35.0 NONRHEUMATIC AORTIC VALVE STENOSIS: Chronic | ICD-10-CM

## 2024-10-08 DIAGNOSIS — E78.00 PURE HYPERCHOLESTEROLEMIA: Chronic | ICD-10-CM

## 2024-10-08 DIAGNOSIS — I50.32 CHRONIC DIASTOLIC HEART FAILURE: Chronic | ICD-10-CM

## 2024-10-08 DIAGNOSIS — I25.10 ARTERIOSCLEROSIS OF CORONARY ARTERY: Chronic | ICD-10-CM

## 2024-10-08 DIAGNOSIS — Z98.890 HISTORY OF LEFT COMMON CAROTID ARTERY STENT PLACEMENT: ICD-10-CM

## 2024-10-08 DIAGNOSIS — Z29.89 INDICATION PRESENT FOR ENDOCARDITIS PROPHYLAXIS: Primary | ICD-10-CM

## 2024-10-08 DIAGNOSIS — I10 ESSENTIAL HYPERTENSION: Chronic | ICD-10-CM

## 2024-10-08 PROCEDURE — 99213 OFFICE O/P EST LOW 20 MIN: CPT | Mod: PBBFAC | Performed by: INTERNAL MEDICINE

## 2024-10-08 PROCEDURE — 99999 PR PBB SHADOW E&M-EST. PATIENT-LVL III: CPT | Mod: PBBFAC,,, | Performed by: INTERNAL MEDICINE

## 2024-10-08 PROCEDURE — 99214 OFFICE O/P EST MOD 30 MIN: CPT | Mod: S$PBB,,, | Performed by: INTERNAL MEDICINE

## 2024-10-08 RX ORDER — AMOXICILLIN 250 MG/5ML
2000 POWDER, FOR SUSPENSION ORAL
Qty: 40 ML | Refills: 0 | Status: SHIPPED | OUTPATIENT
Start: 2024-10-08

## 2024-10-08 NOTE — PROGRESS NOTES
Subjective:   10/08/2024     Patient ID:  Damari Grant is a 89 y.o. female who presents for evaulation of Follow-up        Comes in for cardiac follow-up.  She does have diastolic heart failure.  Echocardiography now shows normal LV function with progressive pulmonary hypertension.  On her last visit she would increasing shortness of breath.  Jardiance was added and she seems to have improved, but she can only tolerate Jardiance intermittently, takes it when she has increased fluid, otherwise has urinary burning.    She does have coronary artery disease and is status post coronary artery bypass grafting.  She had aortic valve stenosis and is status post TAVR.    Chronic atrial fibrillation is noted, currently anticoagulated with Eliquis, no bleeding, dose appropriate.       Hypercholesterolemia is present with intolerance statin therapy, continues on PCSK9 inhibitor therapy.  Last LDL was 30.    Blood pressure at home is very well controlled    Prior visit:   She comes in now for follow-up of shortness of breath.  On previous visit, Jardiance 5 mg daily was added, but not tolerated due to UTIs; echocardiography does show normal TAVR valve function.  Pulmonary hypertension is present, probably secondary to diastolic dysfunction.   Spironolactone had added to her medications.  She has lost weight since then and, fluid.  She was seen in Pulmonary by Dr. Cardenas, her chest x-ray had cleared which she felt was probably due to treatment of her diastolic heart failure.  Her shortness breath has improved.  She is not having chest pains or tightness.  She has not had edema.    In June of 2020 she had a TIA and underwent a left carotid stent placement.  She subsequently underwent a TAVR for severe symptomatic aortic valve stenosis.  Echocardiography in August of 2021 shows left ventricular function to be normal.  The peak pulmonary artery systolic pressure was 40 mm Hg and there was moderate mitral regurgitation.  Left  atrium appeared to be moderately dilated consistent with diastolic dysfunction, in atrial fibrillation though.  Laboratory work showed normal CBC and complete metabolic profile.  The calculated aortic valve area was 1.9 centimeter squared, mean aortic valve gradient 6 mm Hg.    Coronary angiography from June of 2020 showed a 50% left main stenosis.  The LAD had an 80% origin stenosis and 90% proximal stenosis.  Circumflex had a 70% stenosis with severe disease in the 1st and 2nd marginal branches.  The right coronary artery had a 70% stenosis immediately proximal to the origin of the streak PDA which gave a small component of flow into the interventricular septum.  Strict PDA has a 70% origin stenosis.  PDA was small and had not been bypassed.  The left intermammary graft to the LAD was patent free of disease.  Saphenous vein graft to the diagonal was free of disease.  Saphenous vein graft to the 1st marginal was patent and free of disease.  Saphenous vein graft was grafted side-to-side to the 2nd marginal branch in end-to-side therapy marginal branch free of disease.  She was felt to have coronary artery disease medically treated.  She subsequently underwent carotid stenting and TAVR.    She does have chronic atrial fibrillation.  Her heart rate has been well controlled.  She has been anticoagulated with Eliquis.  She has not had bleeding problems.  She was also on aspirin, that has been discontinued    Hypercholesterolemia has been treated with PCSK9 inhibitor therapy.  She had been intolerant to statin therapy.  Currently on PCSK9 inhibitor every 2 weeks, last LDL 81.    She does have a history of iron deficiency, the last iron level was normal.  Last CBC showed no anemia      Shortness of Breath  Associated symptoms include leg swelling. Pertinent negatives include no chest pain, claudication, orthopnea, PND or syncope.   Atrial Fibrillation  Symptoms include shortness of breath. Symptoms are negative for chest  pain, palpitations and syncope. Past medical history includes atrial fibrillation and CHF.   Hypertension  Associated symptoms include malaise/fatigue and shortness of breath. Pertinent negatives include no chest pain, orthopnea, palpitations or PND.   Congestive Heart Failure  Associated symptoms include shortness of breath. Pertinent negatives include no chest pain, claudication, near-syncope or palpitations.   Follow-up  Pertinent negatives include no chest pain.       Patient Active Problem List   Diagnosis    Neuropathy    Hernia of abdominal wall    BOOP (bronchiolitis obliterans with organizing pneumonia)    Essential hypertension    Other constipation    Carotid artery disease    Nonrheumatic aortic valve stenosis    Chronic fatigue    Atherosclerosis of aorta    History of syncope    Risk for falls    Sensorineural hearing loss (SNHL) of both ears    Chronic atrial fibrillation    Arteriosclerosis of coronary artery    Pulmonary hypertension    History of left common carotid artery stent placement    S/P TAVR (transcatheter aortic valve replacement)    History of coronary artery bypass graft    Anticoagulation management encounter    Orthostatic hypotension    Pure hypercholesterolemia    Statin intolerance    Acquired hypothyroidism    Chronic diastolic heart failure    Iron deficiency    Overweight    Hemiplegia affecting right dominant side, unspecified etiology, unspecified hemiplegia type    Chronic obstructive pulmonary disease, unspecified COPD type        Review of patient's allergies indicates:   Allergen Reactions    Codeine Nausea And Vomiting    Crestor [rosuvastatin] Other (See Comments)     Muscle weakness    Fosamax [alendronate] Other (See Comments)     Didn't tolerate over 10 years ago and doesn't remember what happened.     Livalo [pitavastatin] Other (See Comments)     Muscle pain    Simvastatin Other (See Comments)     Leg pains/ weakness         Current Outpatient Medications:      acetaminophen (TYLENOL) 500 MG tablet, Take 500 mg by mouth as needed., Disp: , Rfl:     ELIQUIS 5 mg Tab, TAKE ONE BY MOUTH TWICE DAILY, Disp: 60 tablet, Rfl: 11    empagliflozin (JARDIANCE) 10 mg tablet, Take 1 tablet (10 mg total) by mouth once daily., Disp: 30 tablet, Rfl: 11    ergocalciferol (VITAMIN D2) 50,000 unit Cap, TAKE 1 CAPSULE (50,000 UNITS TOTAL) BY MOUTH EVERY 7 DAYS., Disp: 12 capsule, Rfl: 3    levothyroxine (SYNTHROID) 25 MCG tablet, TAKE ONE DAILY BEFORE BREAKFAST, Disp: 90 tablet, Rfl: 3    metoprolol succinate (TOPROL-XL) 50 MG 24 hr tablet, Take 1 tablet (50 mg total) by mouth every evening., Disp: 90 tablet, Rfl: 3    nystatin-triamcinolone (MYCOLOG) ointment, APPLY TO AFFECTED AREA TWICE DAILY AS NEEDED, Disp: 60 g, Rfl: 1    REPATHA SURECLICK 140 mg/mL PnIj, INJECT 1 ML (140 MG TOTAL) INTO THE SKIN EVERY 14 (FOURTEEN) DAYS., Disp: 6 mL, Rfl: 3    spironolactone (ALDACTONE) 25 MG tablet, Take 1 tablet (25 mg total) by mouth once daily., Disp: 90 tablet, Rfl: 3    amoxicillin (AMOXIL) 250 mg/5 mL suspension, Take 40 mLs (2,000 mg total) by mouth On call Procedure (1 hour prior to dental extraction)., Disp: 40 mL, Rfl: 0    furosemide (LASIX) 20 MG tablet, Take 1 tablet (20 mg total) by mouth as needed (Swelling)., Disp: 30 tablet, Rfl: 11    levalbuterol (XOPENEX) 1.25 mg/3 mL nebulizer solution, Take 3 mLs (1.25 mg total) by nebulization every 4 (four) hours as needed for Wheezing. Rescue, Disp: 90 each, Rfl: 11     Objective:   Review of Systems   Constitutional: Positive for malaise/fatigue.   Cardiovascular:  Positive for dyspnea on exertion and leg swelling. Negative for chest pain, claudication, cyanosis, irregular heartbeat, near-syncope, orthopnea, palpitations, paroxysmal nocturnal dyspnea and syncope.   Respiratory:  Positive for shortness of breath.        Vitals:    10/08/24 0836   BP: 138/84   Pulse: 84       Physical Exam  Vitals reviewed.   Constitutional:       General: She  is not in acute distress.     Appearance: She is well-developed.   HENT:      Head: Normocephalic and atraumatic.      Nose: Nose normal.   Eyes:      Conjunctiva/sclera: Conjunctivae normal.      Pupils: Pupils are equal, round, and reactive to light.   Neck:      Vascular: No hepatojugular reflux or JVD.      Comments: Jugular venous pressure is normal  Cardiovascular:      Rate and Rhythm: Normal rate. Rhythm irregular.      Pulses: Intact distal pulses.      Heart sounds: Murmur (Grade 1 systolic ejection murmur) heard.      No friction rub. No gallop.   Pulmonary:      Effort: Pulmonary effort is normal. No respiratory distress.      Breath sounds: No wheezing or rales.   Chest:      Chest wall: No tenderness.   Abdominal:      General: Bowel sounds are normal. There is no distension.      Palpations: Abdomen is soft.      Tenderness: There is no abdominal tenderness.   Musculoskeletal:         General: No tenderness or deformity. Normal range of motion.      Cervical back: Normal range of motion and neck supple.      Right lower leg: No edema.      Left lower leg: Edema (tr+) present.   Skin:     General: Skin is warm and dry.      Findings: No erythema or rash.   Neurological:      Mental Status: She is alert and oriented to person, place, and time.      Cranial Nerves: No cranial nerve deficit.      Motor: No abnormal muscle tone.      Coordination: Coordination normal.   Psychiatric:         Behavior: Behavior normal.         Thought Content: Thought content normal.         Judgment: Judgment normal.         Lab Results   Component Value Date     07/05/2024    K 4.3 07/05/2024     07/05/2024    CO2 26 07/05/2024    BUN 15 07/05/2024    CREATININE 0.9 07/05/2024     07/05/2024    HGBA1C 5.5 12/09/2022    MG 1.9 03/29/2022    AST 22 01/03/2024    ALT 16 01/03/2024    ALBUMIN 3.8 01/03/2024    PROT 7.2 01/03/2024    BILITOT 1.1 (H) 01/03/2024    WBC 4.24 05/29/2024    HGB 14.9 05/29/2024     HCT 47.5 05/29/2024    MCV 94 05/29/2024     05/29/2024    TSH 3.825 01/03/2024    CHOL 90 (L) 01/03/2024    HDL 41 01/03/2024    LDLCALC 29.8 (L) 01/03/2024    TRIG 96 01/03/2024     Assessment and Plan:     Indication present for endocarditis prophylaxis  Comments:  Can not take pills, ampicillin solution ordered  Orders:  -     amoxicillin (AMOXIL) 250 mg/5 mL suspension; Take 40 mLs (2,000 mg total) by mouth On call Procedure (1 hour prior to dental extraction).  Dispense: 40 mL; Refill: 0    S/P TAVR (transcatheter aortic valve replacement)  Comments:  Doing well  Orders:  -     Echo; Future; Expected date: 01/08/2025    Arteriosclerosis of coronary artery  Comments:  Currently asymptomatic    Bilateral carotid artery stenosis  Comments:  Currently asymptomatic    Chronic atrial fibrillation  Comments:  Accepted as rhythm of choice    Chronic diastolic heart failure  Comments:  Can not tolerate Jardiance regularly, on spironolactone    Essential hypertension  Comments:  Well controlled    History of coronary artery bypass graft    History of left common carotid artery stent placement    Nonrheumatic aortic valve stenosis    Pulmonary hypertension    Pure hypercholesterolemia  Comments:  Excellent on PCSK9 inhibitor        Appears improved clinically, would continue Jardiance and spironolactone, check BMP today.  Follow up in about 3 months (around 1/8/2025).

## 2024-10-21 ENCOUNTER — PATIENT MESSAGE (OUTPATIENT)
Dept: INTERNAL MEDICINE | Facility: CLINIC | Age: 89
End: 2024-10-21
Payer: MEDICARE

## 2024-11-19 ENCOUNTER — PATIENT MESSAGE (OUTPATIENT)
Dept: ADMINISTRATIVE | Facility: HOSPITAL | Age: 89
End: 2024-11-19
Payer: MEDICARE

## 2024-12-03 ENCOUNTER — PATIENT MESSAGE (OUTPATIENT)
Dept: INTERNAL MEDICINE | Facility: CLINIC | Age: 89
End: 2024-12-03
Payer: MEDICARE

## 2024-12-03 DIAGNOSIS — R79.9 ABNORMAL FINDING OF BLOOD CHEMISTRY, UNSPECIFIED: ICD-10-CM

## 2024-12-03 DIAGNOSIS — R73.03 PREDIABETES: ICD-10-CM

## 2024-12-03 DIAGNOSIS — I27.20 PULMONARY HYPERTENSION: Primary | ICD-10-CM

## 2024-12-03 DIAGNOSIS — R53.82 CHRONIC FATIGUE: ICD-10-CM

## 2024-12-03 DIAGNOSIS — E61.1 IRON DEFICIENCY: ICD-10-CM

## 2024-12-05 ENCOUNTER — PATIENT MESSAGE (OUTPATIENT)
Dept: CARDIOLOGY | Facility: CLINIC | Age: 89
End: 2024-12-05
Payer: MEDICARE

## 2024-12-05 DIAGNOSIS — I10 ESSENTIAL HYPERTENSION: Primary | Chronic | ICD-10-CM

## 2024-12-05 RX ORDER — AMLODIPINE BESYLATE 2.5 MG/1
2.5 TABLET ORAL NIGHTLY
Qty: 30 TABLET | Refills: 11 | Status: SHIPPED | OUTPATIENT
Start: 2024-12-05 | End: 2025-12-05

## 2024-12-10 ENCOUNTER — LAB VISIT (OUTPATIENT)
Dept: LAB | Facility: HOSPITAL | Age: 89
End: 2024-12-10
Attending: STUDENT IN AN ORGANIZED HEALTH CARE EDUCATION/TRAINING PROGRAM
Payer: MEDICARE

## 2024-12-10 DIAGNOSIS — I27.20 PULMONARY HYPERTENSION: ICD-10-CM

## 2024-12-10 DIAGNOSIS — R53.82 CHRONIC FATIGUE: ICD-10-CM

## 2024-12-10 DIAGNOSIS — R79.9 ABNORMAL FINDING OF BLOOD CHEMISTRY, UNSPECIFIED: ICD-10-CM

## 2024-12-10 DIAGNOSIS — E61.1 IRON DEFICIENCY: ICD-10-CM

## 2024-12-10 DIAGNOSIS — R73.03 PREDIABETES: ICD-10-CM

## 2024-12-10 LAB
ALBUMIN SERPL BCP-MCNC: 3.9 G/DL (ref 3.5–5.2)
ALP SERPL-CCNC: 90 U/L (ref 40–150)
ALT SERPL W/O P-5'-P-CCNC: 11 U/L (ref 10–44)
ANION GAP SERPL CALC-SCNC: 12 MMOL/L (ref 8–16)
AST SERPL-CCNC: 17 U/L (ref 10–40)
BASOPHILS NFR BLD: 0 % (ref 0–1.9)
BILIRUB SERPL-MCNC: 1.6 MG/DL (ref 0.1–1)
BUN SERPL-MCNC: 11 MG/DL (ref 10–30)
CALCIUM SERPL-MCNC: 10.1 MG/DL (ref 8.7–10.5)
CHLORIDE SERPL-SCNC: 105 MMOL/L (ref 95–110)
CHOLEST SERPL-MCNC: 88 MG/DL (ref 120–199)
CHOLEST/HDLC SERPL: 2 {RATIO} (ref 2–5)
CO2 SERPL-SCNC: 24 MMOL/L (ref 23–29)
CREAT SERPL-MCNC: 0.9 MG/DL (ref 0.5–1.4)
DIFFERENTIAL METHOD BLD: ABNORMAL
EOSINOPHIL NFR BLD: 1 % (ref 0–8)
ERYTHROCYTE [DISTWIDTH] IN BLOOD BY AUTOMATED COUNT: 14.2 % (ref 11.5–14.5)
EST. GFR  (NO RACE VARIABLE): 59.6 ML/MIN/1.73 M^2
ESTIMATED AVG GLUCOSE: 120 MG/DL (ref 68–131)
GLUCOSE SERPL-MCNC: 109 MG/DL (ref 70–110)
HBA1C MFR BLD: 5.8 % (ref 4–5.6)
HCT VFR BLD AUTO: 48.8 % (ref 37–48.5)
HDLC SERPL-MCNC: 45 MG/DL (ref 40–75)
HDLC SERPL: 51.1 % (ref 20–50)
HGB BLD-MCNC: 14.9 G/DL (ref 12–16)
IMM GRANULOCYTES # BLD AUTO: ABNORMAL K/UL (ref 0–0.04)
IMM GRANULOCYTES NFR BLD AUTO: ABNORMAL % (ref 0–0.5)
LDLC SERPL CALC-MCNC: 27.4 MG/DL (ref 63–159)
LYMPHOCYTES NFR BLD: 28 % (ref 18–48)
MCH RBC QN AUTO: 30.5 PG (ref 27–31)
MCHC RBC AUTO-ENTMCNC: 30.5 G/DL (ref 32–36)
MCV RBC AUTO: 100 FL (ref 82–98)
MONOCYTES NFR BLD: 6 % (ref 4–15)
NEUTROPHILS NFR BLD: 64 % (ref 38–73)
NEUTS BAND NFR BLD MANUAL: 1 %
NONHDLC SERPL-MCNC: 43 MG/DL
NRBC BLD-RTO: 0 /100 WBC
PLATELET # BLD AUTO: 138 K/UL (ref 150–450)
PLATELET BLD QL SMEAR: ABNORMAL
PMV BLD AUTO: 12.6 FL (ref 9.2–12.9)
POTASSIUM SERPL-SCNC: 4.2 MMOL/L (ref 3.5–5.1)
PROT SERPL-MCNC: 7.3 G/DL (ref 6–8.4)
RBC # BLD AUTO: 4.89 M/UL (ref 4–5.4)
SODIUM SERPL-SCNC: 141 MMOL/L (ref 136–145)
T4 FREE SERPL-MCNC: 0.93 NG/DL (ref 0.71–1.51)
TRIGL SERPL-MCNC: 78 MG/DL (ref 30–150)
TSH SERPL DL<=0.005 MIU/L-ACNC: 2.86 UIU/ML (ref 0.4–4)
WBC # BLD AUTO: 3.06 K/UL (ref 3.9–12.7)

## 2024-12-10 PROCEDURE — 84439 ASSAY OF FREE THYROXINE: CPT | Performed by: STUDENT IN AN ORGANIZED HEALTH CARE EDUCATION/TRAINING PROGRAM

## 2024-12-10 PROCEDURE — 83036 HEMOGLOBIN GLYCOSYLATED A1C: CPT | Performed by: STUDENT IN AN ORGANIZED HEALTH CARE EDUCATION/TRAINING PROGRAM

## 2024-12-10 PROCEDURE — 36415 COLL VENOUS BLD VENIPUNCTURE: CPT | Mod: PO | Performed by: STUDENT IN AN ORGANIZED HEALTH CARE EDUCATION/TRAINING PROGRAM

## 2024-12-10 PROCEDURE — 85007 BL SMEAR W/DIFF WBC COUNT: CPT | Performed by: STUDENT IN AN ORGANIZED HEALTH CARE EDUCATION/TRAINING PROGRAM

## 2024-12-10 PROCEDURE — 80061 LIPID PANEL: CPT | Performed by: STUDENT IN AN ORGANIZED HEALTH CARE EDUCATION/TRAINING PROGRAM

## 2024-12-10 PROCEDURE — 85027 COMPLETE CBC AUTOMATED: CPT | Performed by: STUDENT IN AN ORGANIZED HEALTH CARE EDUCATION/TRAINING PROGRAM

## 2024-12-10 PROCEDURE — 84443 ASSAY THYROID STIM HORMONE: CPT | Performed by: STUDENT IN AN ORGANIZED HEALTH CARE EDUCATION/TRAINING PROGRAM

## 2024-12-10 PROCEDURE — 80053 COMPREHEN METABOLIC PANEL: CPT | Performed by: STUDENT IN AN ORGANIZED HEALTH CARE EDUCATION/TRAINING PROGRAM

## 2024-12-16 NOTE — PROGRESS NOTES
Subjective:    The following note was written in combination with deep-scribe software and dictation (to which understanding and consent was verbally expressed); please excuse any transcription and/or dictation errors.      Chief Complaint: Annual Exam    HPI  Ms. Grant is a very nice [age]yo F with SNHL, BOOP,  TIA, aortic atherosclerosis s/p TAVR with 2/2 LBBB), Afib, CAD s/p CABG, HTN, HLD, atrophic vaginitis with relapsing remitting chronic dysuria (Estrace cream), severe carotid artery stenosis (L-70/80%; R- 30/40%) s/p stenting of L-internal carotid-A, aortic stenosis, hypothyroidism presenting for annual physical:    Annual screening labs gathered in preparation for this appointment:  -TSH/T4: Within normal limits   -A1c:  Stable/low-grade prediabetes only (5.8)   -CBC:  Modest thrombocytopenia only (this seems new)  -lipid panel:  Extremely well controlled  -CMP:  Relatively new (albeit extremely low-grade) CKD 3A (59.6)    Thrombocytopenia:   -incidentally identified as above  -denies new/precipitous shortness for breath (has longstanding shortness of breath with exertion) or worsening of lower extremity edema (as chronic left lower extremity swelling due to prior vein stripping)    Family, social, surgical Hx reviewed     Health Maintenance:  Due for repeat DEXA scan (nonspecific Fosamax allergy listed)    Past Medical History:   Diagnosis Date    Syncope and collapse      Past Surgical History:   Procedure Laterality Date    ADENOIDECTOMY      AORTIC VALVE REPLACEMENT      APPENDECTOMY      CARDIAC VALVE REPLACEMENT      CARDIAC VALVE SURGERY      CORONARY ARTERY BYPASS GRAFT      HYSTERECTOMY      TONSILLECTOMY       Family History   Problem Relation Name Age of Onset    Cancer Mother       Social History     Socioeconomic History    Marital status:    Tobacco Use    Smoking status: Never    Smokeless tobacco: Never   Substance and Sexual Activity    Alcohol use: Not Currently      Alcohol/week: 0.0 standard drinks of alcohol    Drug use: No    Sexual activity: Not Currently     Partners: Male     Social Drivers of Health     Financial Resource Strain: Low Risk  (12/10/2024)    Overall Financial Resource Strain (CARDIA)     Difficulty of Paying Living Expenses: Not hard at all   Food Insecurity: No Food Insecurity (12/10/2024)    Hunger Vital Sign     Worried About Running Out of Food in the Last Year: Never true     Ran Out of Food in the Last Year: Never true   Transportation Needs: No Transportation Needs (7/10/2023)    PRAPARE - Transportation     Lack of Transportation (Medical): No     Lack of Transportation (Non-Medical): No   Physical Activity: Inactive (12/10/2024)    Exercise Vital Sign     Days of Exercise per Week: 0 days     Minutes of Exercise per Session: 0 min   Stress: No Stress Concern Present (12/10/2024)    [employer] [place] [employer] - Occupational Stress Questionnaire     Feeling of Stress : Not at all   Housing Stability: Low Risk  (7/10/2023)    Housing Stability Vital Sign     Unable to Pay for Housing in the Last Year: No     Number of Places Lived in the Last Year: 1     Unstable Housing in the Last Year: No     Review of patient's allergies indicates:   Allergen Reactions    Codeine Nausea And Vomiting    Crestor [rosuvastatin] Other (See Comments)     Muscle weakness    Fosamax [alendronate] Other (See Comments)     Didn't tolerate over 10 years ago and doesn't remember what happened.     Livalo [pitavastatin] Other (See Comments)     Muscle pain    Simvastatin Other (See Comments)     Leg pains/ weakness     Damari Grant had no medications administered during this visit.     Review of Systems   Constitutional:  Negative for activity change and unexpected weight change.   HENT:  Negative for hearing loss, rhinorrhea and trouble swallowing.    Eyes:  Negative for discharge and visual disturbance.   Respiratory:  Negative for chest tightness and  wheezing.    Cardiovascular:  Negative for chest pain and palpitations.   Gastrointestinal:  Negative for blood in stool, constipation, diarrhea and vomiting.   Endocrine: Negative for polydipsia and polyuria.   Genitourinary:  Negative for difficulty urinating, dysuria, hematuria and menstrual problem.   Musculoskeletal:  Negative for arthralgias, joint swelling and neck pain.   Neurological:  Negative for weakness and headaches.   Psychiatric/Behavioral:  Negative for confusion and dysphoric mood.          Objective:      Vitals:    12/17/24 0952   BP: 130/82   Pulse: 80   Resp: 18   Temp: 97.9 °F (36.6 °C)      Physical Exam  Current Outpatient Medications on File Prior to Visit   Medication Sig Dispense Refill    acetaminophen (TYLENOL) 500 MG tablet Take 500 mg by mouth as needed.      amLODIPine (NORVASC) 2.5 MG tablet Take 1 tablet (2.5 mg total) by mouth every evening. 30 tablet 11    ELIQUIS 5 mg Tab TAKE ONE BY MOUTH TWICE DAILY 60 tablet 11    empagliflozin (JARDIANCE) 10 mg tablet Take 1 tablet (10 mg total) by mouth once daily. 30 tablet 11    ergocalciferol (VITAMIN D2) 50,000 unit Cap TAKE 1 CAPSULE (50,000 UNITS TOTAL) BY MOUTH EVERY 7 DAYS. 12 capsule 3    furosemide (LASIX) 20 MG tablet Take 1 tablet (20 mg total) by mouth as needed (Swelling). 30 tablet 11    levalbuterol (XOPENEX) 1.25 mg/3 mL nebulizer solution Take 3 mLs (1.25 mg total) by nebulization every 4 (four) hours as needed for Wheezing. Rescue 90 each 11    levothyroxine (SYNTHROID) 25 MCG tablet TAKE ONE DAILY BEFORE BREAKFAST 90 tablet 3    metoprolol succinate (TOPROL-XL) 50 MG 24 hr tablet Take 1 tablet (50 mg total) by mouth every evening. 90 tablet 3    nystatin-triamcinolone (MYCOLOG) ointment APPLY TO AFFECTED AREA TWICE DAILY AS NEEDED 60 g 1    REPATHA SURECLICK 140 mg/mL PnIj INJECT 1 ML (140 MG TOTAL) INTO THE SKIN EVERY 14 (FOURTEEN) DAYS. 6 mL 3    spironolactone (ALDACTONE) 25 MG tablet Take 1 tablet (25 mg total) by  mouth once daily. 90 tablet 3    amoxicillin (AMOXIL) 250 mg/5 mL suspension Take 40 mLs (2,000 mg total) by mouth On call Procedure (1 hour prior to dental extraction). (Patient not taking: Reported on 12/17/2024) 40 mL 0     No current facility-administered medications on file prior to visit.         Assessment:       1. Physical exam, annual    2. Thrombocytopenia    3. Localized swelling of left lower extremity        Plan:       Assessment & Plan    THROMBOSIS AND EMBOLISM RISK:  - Explained risk factors for blood clots and pulmonary embolism given relative immobility (walks with a walker and has a personal wheelchair).  - D-dimer blood test ordered to be done immediately; will progress to spiral CT chest (angiography) if significantly elevated.  - Ultrasound of legs ordered to be scheduled within the next week.    MALNUTRITION AND WEIGHT MANAGEMENT:  - Discussed importance of maintaining weight to prevent malnourishment and associated risks (e.g., falls, hip fractures); especially in setting of reported lack of appetite/anosmia.  - Ms. Grant to maintain weight to prevent malnourishment.  - Recommend increasing caloric and protein intake: replace tea and toast with more nutritious options (e.g., apple slices with peanut butter), add Boost or Ensure shakes with meals, and incorporate high-calorie, nutrient-dense foods like dates, apricots, and prunes.  - MsFlaquito Grant to set reminders for regular meal times (e.g., using [employer] or [employer]).  - MsFlaquito Grant to monitor weight regularly; report any 5-pound weight loss within 2 weeks.    SMELL AND TASTE DISTURBANCES:  - Educated on the link between sense of smell and taste, and potential causes of their loss.    VENOUS INSUFFICIENCY AND FLUID MANAGEMENT:  - Explained the relationship between horizontal positioning and fluid redistribution leading to increased urination at night.  - Ms. Gurmeetter to increase fluid intake throughout the day to improve kidney function.  -  Ms. Leviter to elevate feet to heart level when lying down to naturally reduce fluid accumulation in legs.           In the interested of billing clarity, 2 level 4 charge is or drop today.  One for annual physical services (as Medicare does not see fit to compensate preventative health charges) and another for acute evaluation of left lower extremity edema in setting of low platelet counts out of concern for DVT/PE.  Duplicate billing is a legal obligation as these are different services

## 2024-12-17 ENCOUNTER — OFFICE VISIT (OUTPATIENT)
Dept: INTERNAL MEDICINE | Facility: CLINIC | Age: 89
End: 2024-12-17
Payer: MEDICARE

## 2024-12-17 ENCOUNTER — LAB VISIT (OUTPATIENT)
Dept: LAB | Facility: HOSPITAL | Age: 89
End: 2024-12-17
Attending: STUDENT IN AN ORGANIZED HEALTH CARE EDUCATION/TRAINING PROGRAM
Payer: MEDICARE

## 2024-12-17 VITALS
HEIGHT: 65 IN | SYSTOLIC BLOOD PRESSURE: 130 MMHG | DIASTOLIC BLOOD PRESSURE: 82 MMHG | OXYGEN SATURATION: 97 % | RESPIRATION RATE: 18 BRPM | TEMPERATURE: 98 F | WEIGHT: 167.31 LBS | HEART RATE: 80 BPM | BODY MASS INDEX: 27.88 KG/M2

## 2024-12-17 DIAGNOSIS — R07.9 CHEST PAIN, UNSPECIFIED TYPE: Primary | ICD-10-CM

## 2024-12-17 DIAGNOSIS — D69.6 THROMBOCYTOPENIA: ICD-10-CM

## 2024-12-17 DIAGNOSIS — Z00.00 PHYSICAL EXAM, ANNUAL: Primary | ICD-10-CM

## 2024-12-17 DIAGNOSIS — R22.42 LOCALIZED SWELLING OF LEFT LOWER EXTREMITY: ICD-10-CM

## 2024-12-17 LAB — D DIMER PPP IA.FEU-MCNC: 0.66 MG/L FEU

## 2024-12-17 PROCEDURE — 99214 OFFICE O/P EST MOD 30 MIN: CPT | Mod: S$PBB,,, | Performed by: STUDENT IN AN ORGANIZED HEALTH CARE EDUCATION/TRAINING PROGRAM

## 2024-12-17 PROCEDURE — 36415 COLL VENOUS BLD VENIPUNCTURE: CPT | Mod: PO | Performed by: STUDENT IN AN ORGANIZED HEALTH CARE EDUCATION/TRAINING PROGRAM

## 2024-12-17 PROCEDURE — 85379 FIBRIN DEGRADATION QUANT: CPT | Performed by: STUDENT IN AN ORGANIZED HEALTH CARE EDUCATION/TRAINING PROGRAM

## 2024-12-17 PROCEDURE — 99999 PR PBB SHADOW E&M-EST. PATIENT-LVL V: CPT | Mod: PBBFAC,,, | Performed by: STUDENT IN AN ORGANIZED HEALTH CARE EDUCATION/TRAINING PROGRAM

## 2024-12-17 PROCEDURE — 99215 OFFICE O/P EST HI 40 MIN: CPT | Mod: PBBFAC,PO | Performed by: STUDENT IN AN ORGANIZED HEALTH CARE EDUCATION/TRAINING PROGRAM

## 2024-12-20 ENCOUNTER — HOSPITAL ENCOUNTER (OUTPATIENT)
Dept: RADIOLOGY | Facility: HOSPITAL | Age: 89
Discharge: HOME | End: 2024-12-20
Attending: STUDENT IN AN ORGANIZED HEALTH CARE EDUCATION/TRAINING PROGRAM
Payer: MEDICARE

## 2024-12-20 DIAGNOSIS — R22.42 LOCALIZED SWELLING OF LEFT LOWER EXTREMITY: ICD-10-CM

## 2024-12-20 PROCEDURE — 93970 EXTREMITY STUDY: CPT | Mod: 26,,, | Performed by: RADIOLOGY

## 2024-12-20 PROCEDURE — 93970 EXTREMITY STUDY: CPT | Mod: TC

## 2024-12-21 RX ORDER — LEVOTHYROXINE SODIUM 25 UG/1
TABLET ORAL
Qty: 90 TABLET | Refills: 3 | Status: SHIPPED | OUTPATIENT
Start: 2024-12-21

## 2024-12-21 NOTE — TELEPHONE ENCOUNTER
Care Due:                  Date            Visit Type   Department     Provider  --------------------------------------------------------------------------------                                MYCHART                              ANNUAL                              CHECKUP/PHY  MET INTERNAL  Last Visit: 12-      [facility]       Milo Hogan  Next Visit: None Scheduled  None         None Found                                                            Last  Test          Frequency    Reason                     Performed    Due Date  --------------------------------------------------------------------------------    Vitamin D...  12 months..  ergocalciferol...........  Not Found    Overdue    [employer] Embedded Care Due Messages. Reference number: [id number].   12/21/2024 7:22:43 AM CST

## 2024-12-22 NOTE — TELEPHONE ENCOUNTER
Provider Staff:  Action required for this patient    Requires labs      Please see care gap opportunities below in Care Due Message.    Thanks!  [facility]     Appointments      Date Provider   Last Visit   12/17/2024 Milo Hogan MD   Next Visit   Visit date not found Milo Hogan MD     Refill Decision Note   Damari Grant  is requesting a refill authorization.  Brief Assessment and Rationale for Refill:  Approve     Medication Therapy Plan:         Comments:     Note composed:7:56 PM 12/21/2024

## 2024-12-24 ENCOUNTER — HOSPITAL ENCOUNTER (OUTPATIENT)
Dept: RADIOLOGY | Facility: HOSPITAL | Age: 89
Discharge: HOME | End: 2024-12-24
Attending: STUDENT IN AN ORGANIZED HEALTH CARE EDUCATION/TRAINING PROGRAM
Payer: MEDICARE

## 2024-12-24 DIAGNOSIS — R07.9 CHEST PAIN, UNSPECIFIED TYPE: ICD-10-CM

## 2024-12-24 PROCEDURE — 25500020 PHARM REV CODE 255: Performed by: STUDENT IN AN ORGANIZED HEALTH CARE EDUCATION/TRAINING PROGRAM

## 2024-12-24 PROCEDURE — 71275 CT ANGIOGRAPHY CHEST: CPT | Mod: TC

## 2024-12-24 PROCEDURE — 71275 CT ANGIOGRAPHY CHEST: CPT | Mod: 26,,, | Performed by: RADIOLOGY

## 2024-12-24 RX ADMIN — IOHEXOL 75 ML: 350 INJECTION, SOLUTION INTRAVENOUS at 09:12

## 2025-01-02 RX ORDER — NYSTATIN AND TRIAMCINOLONE ACETONIDE 100000; 1 [USP'U]/G; MG/G
OINTMENT TOPICAL
Qty: 60 G | Refills: 1 | Status: SHIPPED | OUTPATIENT
Start: 2025-01-02

## 2025-01-08 ENCOUNTER — HOSPITAL ENCOUNTER (OUTPATIENT)
Dept: CARDIOLOGY | Facility: HOSPITAL | Age: OVER 89
Discharge: HOME | End: 2025-01-08
Attending: INTERNAL MEDICINE
Payer: MEDICARE

## 2025-01-08 VITALS
SYSTOLIC BLOOD PRESSURE: 122 MMHG | DIASTOLIC BLOOD PRESSURE: 75 MMHG | BODY MASS INDEX: 27.82 KG/M2 | WEIGHT: 167 LBS | HEIGHT: 65 IN | HEART RATE: 75 BPM

## 2025-01-08 DIAGNOSIS — Z95.2 S/P TAVR (TRANSCATHETER AORTIC VALVE REPLACEMENT): Chronic | ICD-10-CM

## 2025-01-08 LAB
ASCENDING AORTA: 2.67 CM
AV AREA BY CONTINUOUS VTI: 1.4 CM2
AV INDEX (PROSTH): 0.34
AV LVOT MEAN GRADIENT: 2 MMHG
AV LVOT PEAK GRADIENT: 3 MMHG
AV MEAN GRADIENT: 17 MMHG
AV PEAK GRADIENT: 29.2 MMHG
AV VALVE AREA BY VELOCITY RATIO: 1.4 CM²
AV VALVE AREA: 1.4 CM2
AV VELOCITY RATIO: 0.33
BSA FOR ECHO PROCEDURE: 1.86 M2
CV ECHO LV RWT: 0.52 CM
DOP CALC AO PEAK VEL: 2.7 M/S
DOP CALC AO VTI: 56.9 CM
DOP CALC LVOT AREA: 4.2 CM2
DOP CALC LVOT DIAMETER: 2.3 CM
DOP CALC LVOT PEAK VEL: 0.9 M/S
DOP CALC LVOT STROKE VOLUME: 80.6 CM3
DOP CALC RVOT AREA: 4.52 CM2
DOP CALC RVOT DIAMETER: 2.4 CM
DOP CALCLVOT PEAK VEL VTI: 19.4 CM
E WAVE DECELERATION TIME: 160.37 MS
ECHO EF ESTIMATED: 67 %
ECHO LV POSTERIOR WALL: 1.1 CM (ref 0.6–1.1)
FRACTIONAL SHORTENING: 38.1 % (ref 28–44)
HR MV ECHO: 75 BPM
INTERVENTRICULAR SEPTUM: 1.4 CM (ref 0.6–1.1)
IVC DIAMETER: 1.42 CM
LA MAJOR: 5.57 CM
LA MINOR: 5.39 CM
LA WIDTH: 3.6 CM
LEFT ATRIUM SIZE: 5.03 CM
LEFT ATRIUM VOLUME INDEX MOD: 39.8 ML/M2
LEFT ATRIUM VOLUME INDEX: 46.1 ML/M2
LEFT ATRIUM VOLUME MOD: 72.88 ML
LEFT ATRIUM VOLUME: 84.32 CM3
LEFT INTERNAL DIMENSION IN SYSTOLE: 2.6 CM (ref 2.1–4)
LEFT VENTRICLE DIASTOLIC VOLUME INDEX: 41.8 ML/M2
LEFT VENTRICLE DIASTOLIC VOLUME: 76.5 ML
LEFT VENTRICLE MASS INDEX: 103.4 G/M2
LEFT VENTRICLE SYSTOLIC VOLUME INDEX: 14 ML/M2
LEFT VENTRICLE SYSTOLIC VOLUME: 25.62 ML
LEFT VENTRICULAR INTERNAL DIMENSION IN DIASTOLE: 4.2 CM (ref 3.5–6)
LEFT VENTRICULAR MASS: 189.2 G
MV A" WAVE DURATION": 119.89 MS
MV MEAN GRADIENT: 4 MMHG
MV PEAK A VEL: 0.19 M/S
MV PEAK E VEL: 1.49 M/S
MV PEAK E VEL: 1.53 M/S
OHS CV RV/LV RATIO: 0.69 CM
PISA TR MAX VEL: 3.84 M/S
PULM VEIN A" WAVE DURATION": 119.89 MS
PULM VEIN S/D RATIO: 0.84
PULMONIC VEIN PEAK A VELOCITY: 0.2 M/S
PV PEAK D VEL: 0.32 M/S
PV PEAK S VEL: 0.27 M/S
RA MAJOR: 5.29 CM
RA PRESSURE ESTIMATED: 8 MMHG
RA WIDTH: 3.87 CM
RIGHT ATRIAL AREA: 17.9 CM2
RIGHT VENTRICLE DIASTOLIC BASEL DIMENSION: 2.9 CM
RV TB RVSP: 12 MMHG
RV TISSUE DOPPLER FREE WALL SYSTOLIC VELOCITY 1 (APICAL 4 CHAMBER VIEW): 4.93 CM/S
SINUS: 2.43 CM
STJ: 2.44 CM
TDI LATERAL: 0.1 M/S
TDI SEPTAL: 0.04 M/S
TDI: 0.07 M/S
TR MAX PG: 59 MMHG
TRICUSPID ANNULAR PLANE SYSTOLIC EXCURSION: 1.26 CM
TV PEAK GRADIENT: 59 MMHG
TV REST PULMONARY ARTERY PRESSURE: 67 MMHG
Z-SCORE OF LEFT VENTRICULAR DIMENSION IN END DIASTOLE: -1.88
Z-SCORE OF LEFT VENTRICULAR DIMENSION IN END SYSTOLE: -1.47

## 2025-01-08 PROCEDURE — 93306 TTE W/DOPPLER COMPLETE: CPT

## 2025-01-08 PROCEDURE — 93306 TTE W/DOPPLER COMPLETE: CPT | Mod: 26,,, | Performed by: INTERNAL MEDICINE

## 2025-01-15 ENCOUNTER — OFFICE VISIT (OUTPATIENT)
Dept: CARDIOLOGY | Facility: CLINIC | Age: OVER 89
End: 2025-01-15
Payer: MEDICARE

## 2025-01-15 DIAGNOSIS — Z95.828 HISTORY OF LEFT COMMON CAROTID ARTERY STENT PLACEMENT: ICD-10-CM

## 2025-01-15 DIAGNOSIS — I10 ESSENTIAL HYPERTENSION: Primary | Chronic | ICD-10-CM

## 2025-01-15 DIAGNOSIS — I70.0 ATHEROSCLEROSIS OF AORTA: ICD-10-CM

## 2025-01-15 DIAGNOSIS — Z95.2 S/P TAVR (TRANSCATHETER AORTIC VALVE REPLACEMENT): Chronic | ICD-10-CM

## 2025-01-15 DIAGNOSIS — Z95.1 HISTORY OF CORONARY ARTERY BYPASS GRAFT: Chronic | ICD-10-CM

## 2025-01-15 DIAGNOSIS — I50.32 CHRONIC DIASTOLIC HEART FAILURE: ICD-10-CM

## 2025-01-15 DIAGNOSIS — Z98.890 HISTORY OF LEFT COMMON CAROTID ARTERY STENT PLACEMENT: ICD-10-CM

## 2025-01-15 DIAGNOSIS — I25.10 ARTERIOSCLEROSIS OF CORONARY ARTERY: Chronic | ICD-10-CM

## 2025-01-15 DIAGNOSIS — I48.20 CHRONIC ATRIAL FIBRILLATION: ICD-10-CM

## 2025-01-15 DIAGNOSIS — I27.20 PULMONARY HYPERTENSION: Chronic | ICD-10-CM

## 2025-01-15 DIAGNOSIS — I35.0 NONRHEUMATIC AORTIC VALVE STENOSIS: Chronic | ICD-10-CM

## 2025-01-15 DIAGNOSIS — E78.00 PURE HYPERCHOLESTEROLEMIA: Chronic | ICD-10-CM

## 2025-01-15 PROCEDURE — 99213 OFFICE O/P EST LOW 20 MIN: CPT | Mod: PBBFAC | Performed by: INTERNAL MEDICINE

## 2025-01-15 PROCEDURE — 99214 OFFICE O/P EST MOD 30 MIN: CPT | Mod: S$PBB,,, | Performed by: INTERNAL MEDICINE

## 2025-01-15 PROCEDURE — 99999 PR PBB SHADOW E&M-EST. PATIENT-LVL III: CPT | Mod: PBBFAC,,, | Performed by: INTERNAL MEDICINE

## 2025-01-15 NOTE — PROGRESS NOTES
Subjective:   01/15/2025     Patient ID:  Damari Grant is a 89 y.o. female who presents for evaulation of Follow-up and Results (Echo & Labs )        Comes in for cardiac follow-up.  She does have diastolic heart failure.  Echocardiography now shows normal LV function with persistent pulmonary hypertension.  CVP mildly elevated.  On a prior visit, she noted increasing shortness of breath.  Jardiance was added and she seems to have improved, but she can only tolerate Jardiance intermittently, takes it when she has increased fluid, otherwise has urinary burning.    She does have coronary artery disease and is status post coronary artery bypass grafting.  She had aortic valve stenosis and is status post TAVR.    Chronic atrial fibrillation is noted, currently anticoagulated with Eliquis, no bleeding, dose appropriate.       Hypercholesterolemia is present with intolerance statin therapy, continues on PCSK9 inhibitor therapy.  Last LDL was less than 30.    Blood pressure at home is very well controlled    Prior visit:   She comes in now for follow-up of shortness of breath.  On previous visit, Jardiance 5 mg daily was added, but not tolerated due to UTIs; echocardiography does show normal TAVR valve function.  Pulmonary hypertension is present, probably secondary to diastolic dysfunction.   Spironolactone had added to her medications.  She has lost weight since then and, fluid.  She was seen in Pulmonary by Dr. Cardenas, her chest x-ray had cleared which she felt was probably due to treatment of her diastolic heart failure.  Her shortness breath has improved.  She is not having chest pains or tightness.  She has not had edema.    In June of 2020 she had a TIA and underwent a left carotid stent placement.  She subsequently underwent a TAVR for severe symptomatic aortic valve stenosis.  Echocardiography in August of 2021 shows left ventricular function to be normal.  The peak pulmonary artery systolic pressure was 40 mm  Hg and there was moderate mitral regurgitation.  Left atrium appeared to be moderately dilated consistent with diastolic dysfunction, in atrial fibrillation though.  Laboratory work showed normal CBC and complete metabolic profile.  The calculated aortic valve area was 1.9 centimeter squared, mean aortic valve gradient 6 mm Hg.    Coronary angiography from June of 2020 showed a 50% left main stenosis.  The LAD had an 80% origin stenosis and 90% proximal stenosis.  Circumflex had a 70% stenosis with severe disease in the 1st and 2nd marginal branches.  The right coronary artery had a 70% stenosis immediately proximal to the origin of the streak PDA which gave a small component of flow into the interventricular septum.  Strict PDA has a 70% origin stenosis.  PDA was small and had not been bypassed.  The left intermammary graft to the LAD was patent free of disease.  Saphenous vein graft to the diagonal was free of disease.  Saphenous vein graft to the 1st marginal was patent and free of disease.  Saphenous vein graft was grafted side-to-side to the 2nd marginal branch in end-to-side therapy marginal branch free of disease.  She was felt to have coronary artery disease medically treated.  She subsequently underwent carotid stenting and TAVR.    She does have chronic atrial fibrillation.  Her heart rate has been well controlled.  She has been anticoagulated with Eliquis.  She has not had bleeding problems.  She was also on aspirin, that has been discontinued    Hypercholesterolemia has been treated with PCSK9 inhibitor therapy.  She had been intolerant to statin therapy.  Currently on PCSK9 inhibitor every 2 weeks, last LDL 81.    She does have a history of iron deficiency, the last iron level was normal.  Last CBC showed no anemia      Shortness of Breath  Associated symptoms include leg swelling. Pertinent negatives include no chest pain, claudication, orthopnea, PND or syncope.   Atrial Fibrillation  Symptoms include  shortness of breath. Symptoms are negative for chest pain, palpitations and syncope.   Hypertension  Associated symptoms include malaise/fatigue and shortness of breath. Pertinent negatives include no chest pain, orthopnea, palpitations or PND.   Congestive Heart Failure  Associated symptoms include shortness of breath. Pertinent negatives include no chest pain, claudication, near-syncope or palpitations.   Follow-up  Pertinent negatives include no chest pain.       Patient Active Problem List   Diagnosis    Neuropathy    Hernia of abdominal wall    BOOP (bronchiolitis obliterans with organizing pneumonia)    Essential hypertension    Other constipation    Carotid artery disease    Nonrheumatic aortic valve stenosis    Chronic fatigue    Atherosclerosis of aorta    History of syncope    Risk for falls    Sensorineural hearing loss (SNHL) of both ears    Chronic atrial fibrillation    Arteriosclerosis of coronary artery    Pulmonary hypertension    History of left common carotid artery stent placement    S/P TAVR (transcatheter aortic valve replacement)    History of coronary artery bypass graft    Anticoagulation management encounter    Orthostatic hypotension    Pure hypercholesterolemia    Statin intolerance    Acquired hypothyroidism    Chronic diastolic heart failure    Iron deficiency    Overweight    Hemiplegia affecting right dominant side, unspecified etiology, unspecified hemiplegia type    Chronic obstructive pulmonary disease, unspecified COPD type        Review of patient's allergies indicates:   Allergen Reactions    Codeine Nausea And Vomiting    Crestor [rosuvastatin] Other (See Comments)     Muscle weakness    Fosamax [alendronate] Other (See Comments)     Didn't tolerate over 10 years ago and doesn't remember what happened.     Livalo [pitavastatin] Other (See Comments)     Muscle pain    Simvastatin Other (See Comments)     Leg pains/ weakness         Current Outpatient Medications:      acetaminophen (TYLENOL) 500 MG tablet, Take 500 mg by mouth as needed., Disp: , Rfl:     amLODIPine (NORVASC) 2.5 MG tablet, Take 1 tablet (2.5 mg total) by mouth every evening., Disp: 30 tablet, Rfl: 11    amoxicillin (AMOXIL) 250 mg/5 mL suspension, Take 40 mLs (2,000 mg total) by mouth On call Procedure (1 hour prior to dental extraction). (Patient not taking: Reported on 12/17/2024), Disp: 40 mL, Rfl: 0    ELIQUIS 5 mg Tab, TAKE ONE BY MOUTH TWICE DAILY, Disp: 60 tablet, Rfl: 11    empagliflozin (JARDIANCE) 10 mg tablet, Take 1 tablet (10 mg total) by mouth once daily., Disp: 30 tablet, Rfl: 11    ergocalciferol (VITAMIN D2) 50,000 unit Cap, TAKE 1 CAPSULE (50,000 UNITS TOTAL) BY MOUTH EVERY 7 DAYS., Disp: 12 capsule, Rfl: 3    furosemide (LASIX) 20 MG tablet, Take 1 tablet (20 mg total) by mouth as needed (Swelling)., Disp: 30 tablet, Rfl: 11    levalbuterol (XOPENEX) 1.25 mg/3 mL nebulizer solution, Take 3 mLs (1.25 mg total) by nebulization every 4 (four) hours as needed for Wheezing. Rescue, Disp: 90 each, Rfl: 11    levothyroxine (SYNTHROID) 25 MCG tablet, TAKE ONE DAILY BEFORE BREAKFAST, Disp: 90 tablet, Rfl: 3    metoprolol succinate (TOPROL-XL) 50 MG 24 hr tablet, Take 1 tablet (50 mg total) by mouth every evening., Disp: 90 tablet, Rfl: 3    nystatin-triamcinolone (MYCOLOG) ointment, APPLY TO AFFECTED AREA TWICE DAILY AS NEEDED, Disp: 60 g, Rfl: 1    REPATHA SURECLICK 140 mg/mL PnIj, INJECT 1 ML (140 MG TOTAL) INTO THE SKIN EVERY 14 (FOURTEEN) DAYS., Disp: 6 mL, Rfl: 3    spironolactone (ALDACTONE) 25 MG tablet, Take 1 tablet (25 mg total) by mouth once daily., Disp: 90 tablet, Rfl: 3     Objective:   Review of Systems   Constitutional: Positive for malaise/fatigue.   Cardiovascular:  Positive for dyspnea on exertion and leg swelling. Negative for chest pain, claudication, cyanosis, irregular heartbeat, near-syncope, orthopnea, palpitations, paroxysmal nocturnal dyspnea and syncope.   Respiratory:   Positive for shortness of breath.        Vitals:    01/15/25 0826   BP: (P) 123/67   Pulse: (P) 80       Physical Exam  Vitals reviewed.   Constitutional:       General: She is not in acute distress.     Appearance: She is well-developed.   HENT:      Head: Normocephalic and atraumatic.      Nose: Nose normal.   Eyes:      Conjunctiva/sclera: Conjunctivae normal.      Pupils: Pupils are equal, round, and reactive to light.   Neck:      Vascular: No hepatojugular reflux or JVD.      Comments: Jugular venous pressure is normal  Cardiovascular:      Rate and Rhythm: Normal rate. Rhythm irregular.      Pulses: Intact distal pulses.      Heart sounds: Murmur (Grade 1 systolic ejection murmur) heard.      No friction rub. No gallop.   Pulmonary:      Effort: Pulmonary effort is normal. No respiratory distress.      Breath sounds: No wheezing or rales.   Chest:      Chest wall: No tenderness.   Abdominal:      General: Bowel sounds are normal. There is no distension.      Palpations: Abdomen is soft.      Tenderness: There is no abdominal tenderness.   Musculoskeletal:         General: No tenderness or deformity. Normal range of motion.      Cervical back: Normal range of motion and neck supple.      Right lower leg: No edema.      Left lower leg: Edema (tr+) present.   Skin:     General: Skin is warm and dry.      Findings: No erythema or rash.   Neurological:      Mental Status: She is alert and oriented to person, place, and time.      Cranial Nerves: No cranial nerve deficit.      Motor: No abnormal muscle tone.      Coordination: Coordination normal.   Psychiatric:         Behavior: Behavior normal.         Thought Content: Thought content normal.         Judgment: Judgment normal.         Lab Results   Component Value Date     12/10/2024    K 4.2 12/10/2024     12/10/2024    CO2 24 12/10/2024    BUN 11 12/10/2024    CREATININE 0.9 12/10/2024     12/10/2024    HGBA1C 5.8 (H) 12/10/2024    MG 1.9  03/29/2022    AST 17 12/10/2024    ALT 11 12/10/2024    ALBUMIN 3.9 12/10/2024    PROT 7.3 12/10/2024    BILITOT 1.6 (H) 12/10/2024    WBC 3.06 (L) 12/10/2024    HGB 14.9 12/10/2024    HCT 48.8 (H) 12/10/2024     (H) 12/10/2024     (L) 12/10/2024    TSH 2.859 12/10/2024    CHOL 88 (L) 12/10/2024    HDL 45 12/10/2024    LDLCALC 27.4 (L) 12/10/2024    TRIG 78 12/10/2024     Assessment and Plan:     Essential hypertension  Comments:  Well controlled    Chronic diastolic heart failure  Comments:  With pulmonary hypertension as result, try to increase Jardiance    Chronic atrial fibrillation  Comments:  Accepted as rhythm of choice    Atherosclerosis of aorta    History of coronary artery bypass graft    History of left common carotid artery stent placement    Nonrheumatic aortic valve stenosis  Comments:  Resolved after TAVR    Pulmonary hypertension  Comments:  Continues to be present, would benefit from frequent Jardiance use    Pure hypercholesterolemia  Comments:  Statin intolerant   Great result on PCSK9 inhibitor    S/P TAVR (transcatheter aortic valve replacement)  Comments:  Normal function    Arteriosclerosis of coronary artery        Appears improved clinically, would try to increase Jardiance and continue spironolactone.  Follow up in about 3 months (around 4/15/2025).

## 2025-02-03 ENCOUNTER — OFFICE VISIT (OUTPATIENT)
Dept: PULMONOLOGY | Facility: CLINIC | Age: OVER 89
End: 2025-02-03
Payer: MEDICARE

## 2025-02-03 VITALS
HEART RATE: 91 BPM | SYSTOLIC BLOOD PRESSURE: 141 MMHG | OXYGEN SATURATION: 95 % | BODY MASS INDEX: 27.18 KG/M2 | DIASTOLIC BLOOD PRESSURE: 75 MMHG | WEIGHT: 163.13 LBS | HEIGHT: 65 IN

## 2025-02-03 DIAGNOSIS — I50.32 CHRONIC DIASTOLIC HEART FAILURE: Chronic | ICD-10-CM

## 2025-02-03 DIAGNOSIS — I48.20 CHRONIC ATRIAL FIBRILLATION: Chronic | ICD-10-CM

## 2025-02-03 DIAGNOSIS — I50.1 CARDIAC ASTHMA: Primary | ICD-10-CM

## 2025-02-03 PROCEDURE — 99213 OFFICE O/P EST LOW 20 MIN: CPT | Mod: PBBFAC | Performed by: INTERNAL MEDICINE

## 2025-02-03 PROCEDURE — 99214 OFFICE O/P EST MOD 30 MIN: CPT | Mod: S$PBB,,, | Performed by: INTERNAL MEDICINE

## 2025-02-03 PROCEDURE — 99999 PR PBB SHADOW E&M-EST. PATIENT-LVL III: CPT | Mod: PBBFAC,,, | Performed by: INTERNAL MEDICINE

## 2025-02-03 PROCEDURE — G2211 COMPLEX E/M VISIT ADD ON: HCPCS | Mod: S$PBB,,, | Performed by: INTERNAL MEDICINE

## 2025-02-03 RX ORDER — LEVALBUTEROL TARTRATE 45 UG/1
1-2 AEROSOL, METERED ORAL EVERY 4 HOURS PRN
Qty: 15 G | Refills: 11 | Status: SHIPPED | OUTPATIENT
Start: 2025-02-03 | End: 2026-02-03

## 2025-02-03 NOTE — ASSESSMENT & PLAN NOTE
Significant chronic condition to be followed longitudinally with following long term treatment plan.     CT in Dec 2024 consistent. Never smoker so does not have COPD.   -Previously controlled with xopenex nebs but limited by side effects,  -discussed using xopenex MDI PRN with spacer; alternatively take 1/2 vial of neb treatment

## 2025-02-03 NOTE — PROGRESS NOTES
Subjective:       Patient ID: Damari Grant is a 89 y.o. female.  The patient's last visit with me was on Visit date not found.     Last seen in pulmonary clinic in March 2023.    Was not using xopenex regularly 2/2 side effects of jitteriness and poor sleep. Started having increased wheezing more recently especially after URI exposure and now taking the treatments about once a day. Also having a chronic cough and throat clearing.   Review of Systems    Objective:      Vitals:    02/03/25 0846   BP: (!) 141/75   Pulse: 91     Wt Readings from Last 3 Encounters:   02/03/25 74 kg (163 lb 2.3 oz)   01/15/25 (P) 76.1 kg (167 lb 12.3 oz)   01/08/25 75.8 kg (167 lb)     Temp Readings from Last 3 Encounters:   12/17/24 97.9 °F (36.6 °C) (Temporal)   01/05/24 97.2 °F (36.2 °C) (Temporal)   07/10/23 97.5 °F (36.4 °C)     BP Readings from Last 3 Encounters:   02/03/25 (!) 141/75   01/15/25 (P) 123/67   01/08/25 122/75     Pulse Readings from Last 3 Encounters:   02/03/25 91   01/15/25 (P) 80   01/08/25 75       Physical Exam   Constitutional: She is oriented to person, place, and time. She appears well-developed and well-nourished.   HENT:   Head: Normocephalic.   Mouth/Throat: Oropharynx is clear and moist.   Cardiovascular: Normal rate, regular rhythm and normal heart sounds.   Pulmonary/Chest: Normal expansion, symmetric chest wall expansion, effort normal and breath sounds normal. She has no wheezes.   Abdominal: Soft. She exhibits no distension.   Musculoskeletal:         General: No edema. Normal range of motion.      Cervical back: Neck supple.   Lymphadenopathy:     She has no cervical adenopathy.   Neurological: She is alert and oriented to person, place, and time. Gait normal.   Skin: Skin is warm and dry. No rash noted.   Psychiatric: She has a normal mood and affect. Her behavior is normal. Judgment and thought content normal.     CBC  Lab Results   Component Value Date    WBC 3.06 (L) 12/10/2024    HGB 14.9  "12/10/2024    HCT 48.8 (H) 12/10/2024     (H) 12/10/2024     (L) 12/10/2024         CMP  Sodium   Date Value Ref Range Status   12/10/2024 141 136 - 145 mmol/L Final     Potassium   Date Value Ref Range Status   12/10/2024 4.2 3.5 - 5.1 mmol/L Final     Chloride   Date Value Ref Range Status   12/10/2024 105 95 - 110 mmol/L Final     CO2   Date Value Ref Range Status   12/10/2024 24 23 - 29 mmol/L Final     Glucose   Date Value Ref Range Status   12/10/2024 109 70 - 110 mg/dL Final     BUN   Date Value Ref Range Status   12/10/2024 11 10 - 30 mg/dL Final     Creatinine   Date Value Ref Range Status   12/10/2024 0.9 0.5 - 1.4 mg/dL Final     Calcium   Date Value Ref Range Status   12/10/2024 10.1 8.7 - 10.5 mg/dL Final     Total Protein   Date Value Ref Range Status   12/10/2024 7.3 6.0 - 8.4 g/dL Final     Albumin   Date Value Ref Range Status   12/10/2024 3.9 3.5 - 5.2 g/dL Final     Total Bilirubin   Date Value Ref Range Status   12/10/2024 1.6 (H) 0.1 - 1.0 mg/dL Final     Comment:     For infants and newborns, interpretation of results should be based  on gestational age, weight and in agreement with clinical  observations.    Premature Infant recommended reference ranges:  Up to 24 hours.............<8.0 mg/dL  Up to 48 hours............<12.0 mg/dL  3-5 days..................<15.0 mg/dL  6-29 days.................<15.0 mg/dL       Alkaline Phosphatase   Date Value Ref Range Status   12/10/2024 90 40 - 150 U/L Final     AST   Date Value Ref Range Status   12/10/2024 17 10 - 40 U/L Final     ALT   Date Value Ref Range Status   12/10/2024 11 10 - 44 U/L Final     Anion Gap   Date Value Ref Range Status   12/10/2024 12 8 - 16 mmol/L Final     eGFR   Date Value Ref Range Status   12/10/2024 59.6 (A) >60 mL/min/1.73 m^2 Final       ABG  No results found for: "PH", "PO2", "PCO2"        Personal Diagnostic Review  I have personally reviewed the following data and added my own interpretation as below:  CT " "Chest 12/24/24 images personally reviewed and shows GGO and mosaisicm most consistent with pulmonary edema and associated small airways reactivity      2/3/2025     8:46 AM 1/15/2025     8:26 AM 1/8/2025     8:03 AM 12/17/2024     9:52 AM 10/8/2024     8:36 AM 7/5/2024    10:33 AM 5/29/2024    10:28 AM   Pulmonary Function Tests   SpO2 95 %   97 %      Height 5' 5" (1.651 m)  5' 5" (1.651 m) 5' 5" (1.651 m)      Weight 74 kg (163 lb 2.3 oz) 76.1 kg (167 lb 12.3 oz) 75.8 kg (167 lb) 75.9 kg (167 lb 5.3 oz) 77.2 kg (170 lb 3.1 oz) 78.9 kg (173 lb 15.1 oz) 78.8 kg (173 lb 11.6 oz)   BMI (Calculated) 27.1 27.9 27.8 27.8   28.9         Assessment:       1. Cardiac asthma    2. Chronic atrial fibrillation    3. Chronic diastolic heart failure        Outpatient Encounter Medications as of 2/3/2025   Medication Sig Dispense Refill    acetaminophen (TYLENOL) 500 MG tablet Take 500 mg by mouth as needed.      amLODIPine (NORVASC) 2.5 MG tablet Take 1 tablet (2.5 mg total) by mouth every evening. 30 tablet 11    amoxicillin (AMOXIL) 250 mg/5 mL suspension Take 40 mLs (2,000 mg total) by mouth On call Procedure (1 hour prior to dental extraction). 40 mL 0    ELIQUIS 5 mg Tab TAKE ONE BY MOUTH TWICE DAILY 60 tablet 11    empagliflozin (JARDIANCE) 10 mg tablet Take 1 tablet (10 mg total) by mouth once daily. 30 tablet 11    ergocalciferol (VITAMIN D2) 50,000 unit Cap TAKE 1 CAPSULE (50,000 UNITS TOTAL) BY MOUTH EVERY 7 DAYS. 12 capsule 3    furosemide (LASIX) 20 MG tablet Take 1 tablet (20 mg total) by mouth as needed (Swelling). 30 tablet 11    levalbuterol (XOPENEX) 1.25 mg/3 mL nebulizer solution Take 3 mLs (1.25 mg total) by nebulization every 4 (four) hours as needed for Wheezing. Rescue 90 each 11    levothyroxine (SYNTHROID) 25 MCG tablet TAKE ONE DAILY BEFORE BREAKFAST 90 tablet 3    metoprolol succinate (TOPROL-XL) 50 MG 24 hr tablet Take 1 tablet (50 mg total) by mouth every evening. 90 tablet 3    " nystatin-triamcinolone (MYCOLOG) ointment APPLY TO AFFECTED AREA TWICE DAILY AS NEEDED 60 g 1    REPATHA SURECLICK 140 mg/mL PnIj INJECT 1 ML (140 MG TOTAL) INTO THE SKIN EVERY 14 (FOURTEEN) DAYS. 6 mL 3    spironolactone (ALDACTONE) 25 MG tablet Take 1 tablet (25 mg total) by mouth once daily. 90 tablet 3     No facility-administered encounter medications on file as of 2/3/2025.     1. Cardiac asthma    2. Chronic atrial fibrillation    3. Chronic diastolic heart failure    Plan:     Problem List Items Addressed This Visit          Cardiac/Vascular    Chronic atrial fibrillation (Chronic)    Current Assessment & Plan     Minimize beta agonist and utilize levalbuterol         Chronic diastolic heart failure (Chronic)    Current Assessment & Plan     Cardiology notes reviewed- euvolemic by exam. Contributes to her symptoms         Cardiac asthma - Primary    Current Assessment & Plan     Significant chronic condition to be followed longitudinally with following long term treatment plan.     CT in Dec 2024 consistent. Never smoker so does not have COPD.   -Previously controlled with xopenex nebs but limited by side effects,  -discussed using xopenex MDI PRN with spacer; alternatively take 1/2 vial of neb treatment            Please note Overview Notes are historic documentation. Please review A/P for current updates.  No follow-ups on file.    Future Appointments   Date Time Provider Department Center   4/8/2025 10:40 AM Alexei Langley Jr., MD [facility] [place]         Navneet Ahmadi MD

## 2025-02-10 RX ORDER — ERGOCALCIFEROL 1.25 MG/1
CAPSULE ORAL
Qty: 12 CAPSULE | Refills: 0 | Status: SHIPPED | OUTPATIENT
Start: 2025-02-10

## 2025-02-10 NOTE — TELEPHONE ENCOUNTER
Refill Routing Note   Medication(s) are not appropriate for processing by [facility] for the following reason(s):        Outside of protocol    [facility] action(s):  Route             Appointments  past 12m or future 3m with PCP    Date Provider   Last Visit   12/17/2024 Milo Hogan MD   Next Visit   Visit date not found Milo Hogan MD   ED visits in past 90 days: 0        Note composed:1:24 PM 02/10/2025

## 2025-02-10 NOTE — TELEPHONE ENCOUNTER
No care due was identified.  [facility] Embedded Care Due Messages. Reference number: [id number].   2/10/2025 8:09:56 AM CST

## 2025-02-24 DIAGNOSIS — Z00.00 ENCOUNTER FOR MEDICARE ANNUAL WELLNESS EXAM: ICD-10-CM

## 2025-03-18 RX ORDER — APIXABAN 5 MG/1
5 TABLET, FILM COATED ORAL 2 TIMES DAILY
Qty: 60 TABLET | Refills: 0 | Status: SHIPPED | OUTPATIENT
Start: 2025-03-18

## 2025-04-08 ENCOUNTER — OFFICE VISIT (OUTPATIENT)
Dept: CARDIOLOGY | Facility: CLINIC | Age: OVER 89
End: 2025-04-08
Payer: MEDICARE

## 2025-04-08 VITALS
WEIGHT: 162.06 LBS | SYSTOLIC BLOOD PRESSURE: 126 MMHG | BODY MASS INDEX: 26.96 KG/M2 | HEART RATE: 69 BPM | DIASTOLIC BLOOD PRESSURE: 80 MMHG

## 2025-04-08 DIAGNOSIS — Z95.1 HISTORY OF CORONARY ARTERY BYPASS GRAFT: Chronic | ICD-10-CM

## 2025-04-08 DIAGNOSIS — I10 ESSENTIAL HYPERTENSION: Chronic | ICD-10-CM

## 2025-04-08 DIAGNOSIS — I48.20 CHRONIC ATRIAL FIBRILLATION: Chronic | ICD-10-CM

## 2025-04-08 DIAGNOSIS — I35.0 NONRHEUMATIC AORTIC VALVE STENOSIS: Chronic | ICD-10-CM

## 2025-04-08 DIAGNOSIS — Z98.890 HISTORY OF LEFT COMMON CAROTID ARTERY STENT PLACEMENT: ICD-10-CM

## 2025-04-08 DIAGNOSIS — Z95.3 S/P TAVR (TRANSCATHETER AORTIC VALVE REPLACEMENT): Chronic | ICD-10-CM

## 2025-04-08 DIAGNOSIS — I65.23 BILATERAL CAROTID ARTERY STENOSIS: Chronic | ICD-10-CM

## 2025-04-08 DIAGNOSIS — I50.32 CHRONIC DIASTOLIC HEART FAILURE: Primary | Chronic | ICD-10-CM

## 2025-04-08 DIAGNOSIS — Z95.828 HISTORY OF LEFT COMMON CAROTID ARTERY STENT PLACEMENT: ICD-10-CM

## 2025-04-08 DIAGNOSIS — I27.20 PULMONARY HYPERTENSION: Chronic | ICD-10-CM

## 2025-04-08 DIAGNOSIS — E78.00 PURE HYPERCHOLESTEROLEMIA: Chronic | ICD-10-CM

## 2025-04-08 PROCEDURE — 99999 PR PBB SHADOW E&M-EST. PATIENT-LVL III: CPT | Mod: PBBFAC,,, | Performed by: INTERNAL MEDICINE

## 2025-04-08 PROCEDURE — 99214 OFFICE O/P EST MOD 30 MIN: CPT | Mod: S$PBB,,, | Performed by: INTERNAL MEDICINE

## 2025-04-08 PROCEDURE — 99213 OFFICE O/P EST LOW 20 MIN: CPT | Mod: PBBFAC | Performed by: INTERNAL MEDICINE

## 2025-04-08 NOTE — PROGRESS NOTES
Subjective:   04/08/2025     Patient ID:  Damari Grant is a 89 y.o. female who presents for evaulation of Follow-up        Comes in for cardiac follow-up.  She does have diastolic heart failure.  Echocardiography now shows normal LV function with persistent pulmonary hypertension.  CVP mildly elevated.  On a prior visit, she noted increasing shortness of breath.  Jardiance was added and she seems to have improved, but she can only tolerate Jardiance intermittently, takes it when she has increased fluid, otherwise has urinary burning.  No change.    She does have coronary artery disease and is status post coronary artery bypass grafting.  She had aortic valve stenosis and is status post TAVR.    Chronic atrial fibrillation is noted, currently anticoagulated with Eliquis, no bleeding, dose appropriate.       Hypercholesterolemia is present with intolerance statin therapy, continues on PCSK9 inhibitor therapy.  Last LDL was less than 30.    Blood pressure at home is very well controlled    Prior visit:   She comes in now for follow-up of shortness of breath.  On previous visit, Jardiance 5 mg daily was added, but not tolerated due to UTIs; echocardiography does show normal TAVR valve function.  Pulmonary hypertension is present, probably secondary to diastolic dysfunction.   Spironolactone had added to her medications.  She has lost weight since then and, fluid.  She was seen in Pulmonary by Dr. Cardenas, her chest x-ray had cleared which she felt was probably due to treatment of her diastolic heart failure.  Her shortness breath has improved.  She is not having chest pains or tightness.  She has not had edema.    In June of 2020 she had a TIA and underwent a left carotid stent placement.  She subsequently underwent a TAVR for severe symptomatic aortic valve stenosis.  Echocardiography in August of 2021 shows left ventricular function to be normal.  The peak pulmonary artery systolic pressure was 40 mm Hg and there  was moderate mitral regurgitation.  Left atrium appeared to be moderately dilated consistent with diastolic dysfunction, in atrial fibrillation though.  Laboratory work showed normal CBC and complete metabolic profile.  The calculated aortic valve area was 1.9 centimeter squared, mean aortic valve gradient 6 mm Hg.    Coronary angiography from June of 2020 showed a 50% left main stenosis.  The LAD had an 80% origin stenosis and 90% proximal stenosis.  Circumflex had a 70% stenosis with severe disease in the 1st and 2nd marginal branches.  The right coronary artery had a 70% stenosis immediately proximal to the origin of the streak PDA which gave a small component of flow into the interventricular septum.  Strict PDA has a 70% origin stenosis.  PDA was small and had not been bypassed.  The left intermammary graft to the LAD was patent free of disease.  Saphenous vein graft to the diagonal was free of disease.  Saphenous vein graft to the 1st marginal was patent and free of disease.  Saphenous vein graft was grafted side-to-side to the 2nd marginal branch in end-to-side therapy marginal branch free of disease.  She was felt to have coronary artery disease medically treated.  She subsequently underwent carotid stenting and TAVR.    She does have chronic atrial fibrillation.  Her heart rate has been well controlled.  She has been anticoagulated with Eliquis.  She has not had bleeding problems.  She was also on aspirin, that has been discontinued    Hypercholesterolemia has been treated with PCSK9 inhibitor therapy.  She had been intolerant to statin therapy.  Currently on PCSK9 inhibitor every 2 weeks, last LDL 81.    She does have a history of iron deficiency, the last iron level was normal.  Last CBC showed no anemia      Shortness of Breath  Associated symptoms include leg swelling. Pertinent negatives include no chest pain, claudication, orthopnea, PND or syncope.   Atrial Fibrillation  Symptoms include shortness of  breath. Symptoms are negative for chest pain, palpitations and syncope.   Hypertension  Associated symptoms include malaise/fatigue and shortness of breath. Pertinent negatives include no chest pain, orthopnea, palpitations or PND.   Congestive Heart Failure  Associated symptoms include shortness of breath. Pertinent negatives include no chest pain, claudication, near-syncope or palpitations.   Follow-up  Pertinent negatives include no chest pain.       Patient Active Problem List   Diagnosis    Neuropathy    Hernia of abdominal wall    Essential hypertension    Other constipation    Carotid artery disease    Nonrheumatic aortic valve stenosis    Chronic fatigue    Atherosclerosis of aorta    History of syncope    Risk for falls    Sensorineural hearing loss (SNHL) of both ears    Chronic atrial fibrillation    Arteriosclerosis of coronary artery    Pulmonary hypertension    History of left common carotid artery stent placement    S/P TAVR (transcatheter aortic valve replacement)    History of coronary artery bypass graft    Anticoagulation management encounter    Orthostatic hypotension    Pure hypercholesterolemia    Statin intolerance    Acquired hypothyroidism    Chronic diastolic heart failure    Iron deficiency    Overweight    Hemiplegia affecting right dominant side, unspecified etiology, unspecified hemiplegia type    Cardiac asthma        Review of patient's allergies indicates:   Allergen Reactions    Codeine Nausea And Vomiting    Crestor [rosuvastatin] Other (See Comments)     Muscle weakness    Fosamax [alendronate] Other (See Comments)     Didn't tolerate over 10 years ago and doesn't remember what happened.     Livalo [pitavastatin] Other (See Comments)     Muscle pain    Simvastatin Other (See Comments)     Leg pains/ weakness         Current Outpatient Medications:     acetaminophen (TYLENOL) 500 MG tablet, Take 500 mg by mouth as needed., Disp: , Rfl:     amLODIPine (NORVASC) 2.5 MG tablet, Take 1  tablet (2.5 mg total) by mouth every evening., Disp: 30 tablet, Rfl: 11    amoxicillin (AMOXIL) 250 mg/5 mL suspension, Take 40 mLs (2,000 mg total) by mouth On call Procedure (1 hour prior to dental extraction)., Disp: 40 mL, Rfl: 0    ELIQUIS 5 mg Tab, TAKE ONE BY MOUTH TWICE DAILY, Disp: 60 tablet, Rfl: 0    empagliflozin (JARDIANCE) 10 mg tablet, Take 1 tablet (10 mg total) by mouth once daily., Disp: 30 tablet, Rfl: 11    ergocalciferol (VITAMIN D2) 50,000 unit Cap, TAKE 1 CAPSULE (50,000 UNITS TOTAL) BY MOUTH EVERY 7 DAYS., Disp: 12 capsule, Rfl: 0    inhalation spacing device, Use as directed for inhalation., Disp: 1 each, Rfl: 0    levalbuterol (XOPENEX HFA) 45 mcg/actuation inhaler, Inhale 1-2 puffs into the lungs every 4 (four) hours as needed for Wheezing. Rescue, Disp: 15 g, Rfl: 11    levothyroxine (SYNTHROID) 25 MCG tablet, TAKE ONE DAILY BEFORE BREAKFAST, Disp: 90 tablet, Rfl: 3    metoprolol succinate (TOPROL-XL) 50 MG 24 hr tablet, Take 1 tablet (50 mg total) by mouth every evening., Disp: 90 tablet, Rfl: 3    nystatin-triamcinolone (MYCOLOG) ointment, APPLY TO AFFECTED AREA TWICE DAILY AS NEEDED, Disp: 60 g, Rfl: 1    REPATHA SURECLICK 140 mg/mL PnIj, INJECT 1 ML (140 MG TOTAL) INTO THE SKIN EVERY 14 (FOURTEEN) DAYS., Disp: 6 mL, Rfl: 3    spironolactone (ALDACTONE) 25 MG tablet, Take 1 tablet (25 mg total) by mouth once daily., Disp: 90 tablet, Rfl: 3    furosemide (LASIX) 20 MG tablet, Take 1 tablet (20 mg total) by mouth as needed (Swelling)., Disp: 30 tablet, Rfl: 11    levalbuterol (XOPENEX) 1.25 mg/3 mL nebulizer solution, Take 3 mLs (1.25 mg total) by nebulization every 4 (four) hours as needed for Wheezing. Rescue, Disp: 90 each, Rfl: 11     Objective:   Review of Systems   Constitutional: Positive for malaise/fatigue.   Cardiovascular:  Positive for dyspnea on exertion and leg swelling. Negative for chest pain, claudication, cyanosis, irregular heartbeat, near-syncope, orthopnea,  palpitations, paroxysmal nocturnal dyspnea and syncope.   Respiratory:  Positive for shortness of breath.        Vitals:    04/08/25 1032   BP: 126/80   Pulse: 69       Physical Exam  Vitals reviewed.   Constitutional:       General: She is not in acute distress.     Appearance: She is well-developed.   HENT:      Head: Normocephalic and atraumatic.      Nose: Nose normal.   Eyes:      Conjunctiva/sclera: Conjunctivae normal.      Pupils: Pupils are equal, round, and reactive to light.   Neck:      Vascular: No hepatojugular reflux or JVD.      Comments: Jugular venous pressure is normal  Cardiovascular:      Rate and Rhythm: Normal rate. Rhythm irregular.      Pulses: Intact distal pulses.      Heart sounds: Murmur (Grade 1 systolic ejection murmur) heard.      No friction rub. No gallop.   Pulmonary:      Effort: Pulmonary effort is normal. No respiratory distress.      Breath sounds: No wheezing or rales.   Chest:      Chest wall: No tenderness.   Abdominal:      General: Bowel sounds are normal. There is no distension.      Palpations: Abdomen is soft.      Tenderness: There is no abdominal tenderness.   Musculoskeletal:         General: No tenderness or deformity. Normal range of motion.      Cervical back: Normal range of motion and neck supple.      Right lower leg: No edema.      Left lower leg: Edema (tr+) present.   Skin:     General: Skin is warm and dry.      Findings: No erythema or rash.   Neurological:      Mental Status: She is alert and oriented to person, place, and time.      Cranial Nerves: No cranial nerve deficit.      Motor: No abnormal muscle tone.      Coordination: Coordination normal.   Psychiatric:         Behavior: Behavior normal.         Thought Content: Thought content normal.         Judgment: Judgment normal.         Lab Results   Component Value Date     12/10/2024    K 4.2 12/10/2024     12/10/2024    CO2 24 12/10/2024    BUN 11 12/10/2024    CREATININE 0.9 12/10/2024      12/10/2024    HGBA1C 5.8 (H) 12/10/2024    MG 1.9 03/29/2022    AST 17 12/10/2024    ALT 11 12/10/2024    ALBUMIN 3.9 12/10/2024    PROT 7.3 12/10/2024    BILITOT 1.6 (H) 12/10/2024    WBC 3.06 (L) 12/10/2024    HGB 14.9 12/10/2024    HCT 48.8 (H) 12/10/2024     (H) 12/10/2024     (L) 12/10/2024    TSH 2.859 12/10/2024    CHOL 88 (L) 12/10/2024    HDL 45 12/10/2024    LDLCALC 27.4 (L) 12/10/2024    TRIG 78 12/10/2024     Assessment and Plan:     Chronic diastolic heart failure  Comments:  Continue tolerated dose Jardiance, spironolactone  Orders:  -     Echo; Future; Expected date: 07/08/2025  -     CBC Auto Differential; Future; Expected date: 07/08/2025  -     Basic Metabolic Panel; Future; Expected date: 07/08/2025  -     NT-Pro Natriuretic Peptide; Future; Expected date: 07/08/2025    S/P TAVR (transcatheter aortic valve replacement)  Comments:  No significant abnormality    Pure hypercholesterolemia  Comments:  Continue PCSK9 inhibitor    Pulmonary hypertension  Comments:  Optimal therapy for diastolic heart failure    Nonrheumatic aortic valve stenosis    History of left common carotid artery stent placement    History of coronary artery bypass graft    Essential hypertension  Comments:  Well controlled    Chronic atrial fibrillation    Bilateral carotid artery stenosis  Comments:  Asymptomatic        Follow up in about 3 months (around 7/8/2025).

## 2025-04-15 RX ORDER — APIXABAN 5 MG/1
5 TABLET, FILM COATED ORAL 2 TIMES DAILY
Qty: 60 TABLET | Refills: 11 | Status: SHIPPED | OUTPATIENT
Start: 2025-04-15

## 2025-05-04 ENCOUNTER — TELEPHONE (OUTPATIENT)
Dept: HOME HEALTH SERVICES | Facility: CLINIC | Age: OVER 89
End: 2025-05-04
Payer: MEDICARE

## 2025-05-05 ENCOUNTER — OFFICE VISIT (OUTPATIENT)
Dept: HOME HEALTH SERVICES | Facility: CLINIC | Age: OVER 89
End: 2025-05-05
Payer: MEDICARE

## 2025-05-05 VITALS
BODY MASS INDEX: 27 KG/M2 | SYSTOLIC BLOOD PRESSURE: 124 MMHG | TEMPERATURE: 98 F | HEIGHT: 65 IN | DIASTOLIC BLOOD PRESSURE: 60 MMHG | WEIGHT: 162.06 LBS | RESPIRATION RATE: 16 BRPM | OXYGEN SATURATION: 95 % | HEART RATE: 72 BPM

## 2025-05-05 DIAGNOSIS — I50.32 CHRONIC DIASTOLIC HEART FAILURE: Chronic | ICD-10-CM

## 2025-05-05 DIAGNOSIS — E03.9 ACQUIRED HYPOTHYROIDISM: ICD-10-CM

## 2025-05-05 DIAGNOSIS — I27.20 PULMONARY HYPERTENSION: Chronic | ICD-10-CM

## 2025-05-05 DIAGNOSIS — E78.00 PURE HYPERCHOLESTEROLEMIA: Chronic | ICD-10-CM

## 2025-05-05 DIAGNOSIS — H90.3 SENSORINEURAL HEARING LOSS (SNHL) OF BOTH EARS: ICD-10-CM

## 2025-05-05 DIAGNOSIS — G81.91 HEMIPLEGIA AFFECTING RIGHT DOMINANT SIDE, UNSPECIFIED ETIOLOGY, UNSPECIFIED HEMIPLEGIA TYPE: ICD-10-CM

## 2025-05-05 DIAGNOSIS — Z00.00 ENCOUNTER FOR MEDICARE ANNUAL WELLNESS EXAM: Primary | ICD-10-CM

## 2025-05-05 DIAGNOSIS — G62.9 NEUROPATHY: ICD-10-CM

## 2025-05-05 DIAGNOSIS — I10 ESSENTIAL HYPERTENSION: Chronic | ICD-10-CM

## 2025-05-05 DIAGNOSIS — Z99.89 DEPENDENCE ON OTHER ENABLING MACHINES AND DEVICES: ICD-10-CM

## 2025-05-05 DIAGNOSIS — I70.0 ATHEROSCLEROSIS OF AORTA: Chronic | ICD-10-CM

## 2025-05-05 DIAGNOSIS — I25.10 ARTERIOSCLEROSIS OF CORONARY ARTERY: Chronic | ICD-10-CM

## 2025-05-05 DIAGNOSIS — K43.9 HERNIA OF ABDOMINAL WALL: ICD-10-CM

## 2025-05-05 DIAGNOSIS — R26.9 ABNORMALITY OF GAIT AND MOBILITY: ICD-10-CM

## 2025-05-05 DIAGNOSIS — I48.20 CHRONIC ATRIAL FIBRILLATION: Chronic | ICD-10-CM

## 2025-05-05 DIAGNOSIS — D69.6 THROMBOCYTOPENIA: ICD-10-CM

## 2025-05-05 DIAGNOSIS — E61.1 IRON DEFICIENCY: ICD-10-CM

## 2025-05-05 PROCEDURE — G0439 PPPS, SUBSEQ VISIT: HCPCS | Mod: S$GLB,,,

## 2025-05-05 NOTE — PATIENT INSTRUCTIONS
Counseling and Referral of Other Preventative  (Italic type indicates deductible and co-insurance are waived)    Patient Name: Damari Grant  Today's Date: 5/5/2025    Health Maintenance       Date Due Completion Date    RSV Vaccine (Age 60+ and Pregnant patients) (1 - 1-dose 75+ series) Never done ---    TETANUS VACCINE 09/13/2015 9/13/2005    DEXA Scan 06/29/2020 6/29/2018    COVID-19 Vaccine (3 - 2024-25 season) 09/01/2024 5/8/2021    Lipid Panel 12/10/2029 12/10/2024        No orders of the defined types were placed in this encounter.    The following information is provided to all patients.  This information is to help you find resources for any of the problems found today that may be affecting your health:                  Living healthy guide: www.[facility].[place].gov      Understanding Diabetes: www.diabetes.org      Eating healthy: www.cdc.gov/healthyweight      [facility] home safety checklist: www.cdc.gov/steadi/patient.html      Agency on Aging: www.goea.[place].[facility]      Alcoholics anonymous (AA): www.aa.org      Physical Activity: www.[place]ou.nih.gov/hk7cnru      Tobacco use: www.quitwithusla.org

## 2025-05-05 NOTE — PROGRESS NOTES
"Damari Grant presented for a follow-up Medicare AWV today. The following components were reviewed and updated:    Medical history  Family History  Social history  Allergies and Current Medications  Health Risk Assessment  Health Maintenance  Care Team    **See Completed Assessments for Annual Wellness visit with in the encounter summary    The following assessments were completed:  Depression Screening  Cognitive function Screening    Timed Get Up Test: Deferred, impaired mobility  Whisper Test: Deferred, hearing impaired      Opioid documentation:      Patient does not have a current opioid prescription.          Vitals:    05/05/25 0822   BP: 124/60   BP Location: Left arm   Patient Position: Sitting   Pulse: 72   Resp: 16   Temp: 98.2 °F (36.8 °C)   SpO2: 95%   Weight: 73.5 kg (162 lb 0.6 oz)   Height: 5' 5" (1.651 m)     Body mass index is 26.96 kg/m².       Physical Exam  Vitals reviewed.   Constitutional:       General: She is not in acute distress.     Appearance: Normal appearance.   HENT:      Head: Normocephalic.      Right Ear: Decreased hearing noted.      Left Ear: Decreased hearing noted.   Cardiovascular:      Rate and Rhythm: Normal rate and regular rhythm.      Pulses: Normal pulses.      Heart sounds: Normal heart sounds.   Pulmonary:      Effort: Pulmonary effort is normal. No respiratory distress.      Breath sounds: Normal breath sounds. No wheezing.   Abdominal:      General: Bowel sounds are normal.      Tenderness: There is no abdominal tenderness.   Musculoskeletal:         General: Normal range of motion.      Cervical back: Normal range of motion.      Right lower leg: No edema.      Left lower leg: No edema.   Skin:     General: Skin is warm and dry.      Capillary Refill: Capillary refill takes less than 2 seconds.   Neurological:      General: No focal deficit present.      Mental Status: She is alert and oriented to person, place, and time.      Gait: Gait abnormal.   Psychiatric:    "      Mood and Affect: Mood normal.         Behavior: Behavior normal.           Diagnoses and health risks identified today and associated recommendations/orders:    1. Encounter for Medicare annual wellness exam  Assessments completed.   recommendations reviewed.  F/u with PCP as instructed.      - Referral to Enhanced Annual Wellness Visit (eAWV) W+1    2. Chronic atrial fibrillation  Chronic, stable on current Eliquis regimen. Followed by Cardiology.     3. Chronic diastolic heart failure  Chronic, stable on current Jardiance, Lasix, Metoprolol, Aldactone regimen. Followed by Cardiology.     4. Pulmonary hypertension  Chronic, stable on current regimen. Followed by Cardiology.     5. Hemiplegia affecting right dominant side, unspecified etiology, unspecified hemiplegia type  Chronic, stable on current regimen. Followed by Neurology.     6. Thrombocytopenia  Chronic, stable on current regimen. Followed by PCP.     7. Arteriosclerosis of coronary artery  Chronic, stable on current regimen. Followed by PCP.    8. Atherosclerosis of aorta  Chronic, stable on current Reptaha regimen. Followed by PCP.     9. Essential hypertension  Chronic, stable on current Amldoipine, Metoprolol regimen. Followed by PCP.     10. Pure hypercholesterolemia  Chronic, stable on current Repatha regimen. Followed by PCP.     11. Iron deficiency  Chronic, stable on current regimen. Followed by PCP.   Lab Results   Component Value Date    IRON 54 02/11/2022    TRANSFERRIN 379 (H) 02/11/2022    TIBC 561 (H) 02/11/2022    FESATURATED 10 (L) 02/11/2022      12. Acquired hypothyroidism  Chronic, stable on current Synthroid regimen. Followed by PCP.   Lab Results   Component Value Date    TSH 2.859 12/10/2024     13. Hernia of abdominal wall  Chronic, stable on current regimen. Followed by PCP.     14. Neuropathy  Chronic, stable on current regimen. Followed by PCP.     15. Sensorineural hearing loss (SNHL) of both ears  Chronic, stable on  current regimen. Followed by Audiology.     16. Abnormality of gait and mobility  Unable to safely ambulate without assistance.     17. Dependence on other enabling machines and devices  Uses wheeled walker.       Provided Damari with a 5-10 year written screening schedule and personal prevention plan. Recommendations were developed using the USPSTF age appropriate recommendations. Education, counseling, and referrals were provided as needed.  After Visit Summary printed and given to patient which includes a list of additional screenings\tests needed.    Follow up in about 1 year (around 5/5/2026) for Medicare AWV.      Ines Hussein NP    I offered to discuss advanced care planning, including how to pick a person who would make decisions for you if you were unable to make them for yourself, called a health care power of , and what kind of decisions you might make such as use of life sustaining treatments such as ventilators and tube feeding when faced with a life limiting illness recorded on a living will that they will need to know. (How you want to be cared for as you near the end of your natural life)     X  Patient has advanced directives on file, which we reviewed, and they do not wish to make changes.

## 2025-05-20 ENCOUNTER — HOSPITAL ENCOUNTER (INPATIENT)
Facility: HOSPITAL | Age: OVER 89
LOS: 1 days | Discharge: HOME | DRG: 065 | End: 2025-05-21
Attending: EMERGENCY MEDICINE | Admitting: PSYCHIATRY & NEUROLOGY
Payer: MEDICARE

## 2025-05-20 DIAGNOSIS — I63.9 STROKE: ICD-10-CM

## 2025-05-20 DIAGNOSIS — I63.9 CEREBROVASCULAR ACCIDENT (CVA), UNSPECIFIED MECHANISM: Primary | ICD-10-CM

## 2025-05-20 DIAGNOSIS — R47.81 SLURRED SPEECH: ICD-10-CM

## 2025-05-20 DIAGNOSIS — R00.1 BRADYCARDIA: ICD-10-CM

## 2025-05-20 PROBLEM — I63.411 EMBOLIC STROKE INVOLVING RIGHT MIDDLE CEREBRAL ARTERY: Status: ACTIVE | Noted: 2025-05-20

## 2025-05-20 LAB
ABSOLUTE EOSINOPHIL (OHS): 0.01 K/UL
ABSOLUTE MONOCYTE (OHS): 0.29 K/UL (ref 0.3–1)
ABSOLUTE NEUTROPHIL COUNT (OHS): 1.61 K/UL (ref 1.8–7.7)
ALBUMIN SERPL BCP-MCNC: 4 G/DL (ref 3.5–5.2)
ALP SERPL-CCNC: 101 UNIT/L (ref 40–150)
ALT SERPL W/O P-5'-P-CCNC: 12 UNIT/L (ref 10–44)
ANION GAP (OHS): 10 MMOL/L (ref 8–16)
AST SERPL-CCNC: 17 UNIT/L (ref 11–45)
BASOPHILS # BLD AUTO: 0.01 K/UL
BASOPHILS NFR BLD AUTO: 0.3 %
BILIRUB SERPL-MCNC: 1.5 MG/DL (ref 0.1–1)
BILIRUB UR QL STRIP.AUTO: NEGATIVE
BUN SERPL-MCNC: 14 MG/DL (ref 10–30)
CALCIUM SERPL-MCNC: 9.7 MG/DL (ref 8.7–10.5)
CHLORIDE SERPL-SCNC: 105 MMOL/L (ref 95–110)
CHOLEST SERPL-MCNC: 110 MG/DL (ref 120–199)
CHOLEST/HDLC SERPL: 2.3 {RATIO} (ref 2–5)
CLARITY UR: CLEAR
CO2 SERPL-SCNC: 23 MMOL/L (ref 23–29)
COLOR UR AUTO: YELLOW
CREAT SERPL-MCNC: 0.8 MG/DL (ref 0.5–1.4)
EAG (OHS): 111 MG/DL (ref 68–131)
ERYTHROCYTE [DISTWIDTH] IN BLOOD BY AUTOMATED COUNT: 13.4 % (ref 11.5–14.5)
GFR SERPLBLD CREATININE-BSD FMLA CKD-EPI: >60 ML/MIN/1.73/M2
GLUCOSE SERPL-MCNC: 105 MG/DL (ref 70–110)
GLUCOSE UR QL STRIP: NEGATIVE
HBA1C MFR BLD: 5.5 % (ref 4–5.6)
HCT VFR BLD AUTO: 46 % (ref 37–48.5)
HCV AB SERPL QL IA: NORMAL
HDLC SERPL-MCNC: 47 MG/DL (ref 40–75)
HDLC SERPL: 42.7 % (ref 20–50)
HGB BLD-MCNC: 14.4 GM/DL (ref 12–16)
HGB UR QL STRIP: NEGATIVE
HIV 1+2 AB+HIV1 P24 AG SERPL QL IA: NORMAL
HOLD SPECIMEN: NORMAL
HOLD SPECIMEN: NORMAL
IMM GRANULOCYTES # BLD AUTO: 0.08 K/UL (ref 0–0.04)
IMM GRANULOCYTES NFR BLD AUTO: 2.7 % (ref 0–0.5)
KETONES UR QL STRIP: NEGATIVE
LDLC SERPL CALC-MCNC: 43.8 MG/DL (ref 63–159)
LEUKOCYTE ESTERASE UR QL STRIP: NEGATIVE
LYMPHOCYTES # BLD AUTO: 0.96 K/UL (ref 1–4.8)
MAGNESIUM SERPL-MCNC: 2 MG/DL (ref 1.6–2.6)
MCH RBC QN AUTO: 30.6 PG (ref 27–31)
MCHC RBC AUTO-ENTMCNC: 31.3 G/DL (ref 32–36)
MCV RBC AUTO: 98 FL (ref 82–98)
NITRITE UR QL STRIP: NEGATIVE
NONHDLC SERPL-MCNC: 63 MG/DL
NUCLEATED RBC (/100WBC) (OHS): 0 /100 WBC
OHS QRS DURATION: 154 MS
OHS QRS DURATION: 154 MS
OHS QTC CALCULATION: 476 MS
OHS QTC CALCULATION: 491 MS
PH UR STRIP: 6 [PH]
PLATELET # BLD AUTO: 140 K/UL (ref 150–450)
PMV BLD AUTO: 12.1 FL (ref 9.2–12.9)
POTASSIUM SERPL-SCNC: 3.9 MMOL/L (ref 3.5–5.1)
PROT SERPL-MCNC: 7.7 GM/DL (ref 6–8.4)
PROT UR QL STRIP: NEGATIVE
RBC # BLD AUTO: 4.71 M/UL (ref 4–5.4)
RELATIVE EOSINOPHIL (OHS): 0.3 %
RELATIVE LYMPHOCYTE (OHS): 32.4 % (ref 18–48)
RELATIVE MONOCYTE (OHS): 9.8 % (ref 4–15)
RELATIVE NEUTROPHIL (OHS): 54.5 % (ref 38–73)
SODIUM SERPL-SCNC: 138 MMOL/L (ref 136–145)
SP GR UR STRIP: 1.01
TRIGL SERPL-MCNC: 96 MG/DL (ref 30–150)
TSH SERPL-ACNC: 2.99 UIU/ML (ref 0.4–4)
UROBILINOGEN UR STRIP-ACNC: NEGATIVE EU/DL
WBC # BLD AUTO: 2.96 K/UL (ref 3.9–12.7)

## 2025-05-20 PROCEDURE — 25500020 PHARM REV CODE 255: Performed by: PSYCHIATRY & NEUROLOGY

## 2025-05-20 PROCEDURE — 80061 LIPID PANEL: CPT | Performed by: PSYCHIATRY & NEUROLOGY

## 2025-05-20 PROCEDURE — 83735 ASSAY OF MAGNESIUM: CPT | Performed by: EMERGENCY MEDICINE

## 2025-05-20 PROCEDURE — 99223 1ST HOSP IP/OBS HIGH 75: CPT | Mod: AI,,, | Performed by: PSYCHIATRY & NEUROLOGY

## 2025-05-20 PROCEDURE — 85025 COMPLETE CBC W/AUTO DIFF WBC: CPT | Performed by: EMERGENCY MEDICINE

## 2025-05-20 PROCEDURE — 93010 ELECTROCARDIOGRAM REPORT: CPT | Mod: ,,, | Performed by: INTERNAL MEDICINE

## 2025-05-20 PROCEDURE — 87389 HIV-1 AG W/HIV-1&-2 AB AG IA: CPT | Performed by: PHYSICIAN ASSISTANT

## 2025-05-20 PROCEDURE — 99285 EMERGENCY DEPT VISIT HI MDM: CPT | Mod: 25

## 2025-05-20 PROCEDURE — 25000003 PHARM REV CODE 250

## 2025-05-20 PROCEDURE — 81003 URINALYSIS AUTO W/O SCOPE: CPT | Performed by: EMERGENCY MEDICINE

## 2025-05-20 PROCEDURE — 84520 ASSAY OF UREA NITROGEN: CPT | Performed by: EMERGENCY MEDICINE

## 2025-05-20 PROCEDURE — 11000001 HC ACUTE MED/SURG PRIVATE ROOM

## 2025-05-20 PROCEDURE — 93005 ELECTROCARDIOGRAM TRACING: CPT

## 2025-05-20 PROCEDURE — 86803 HEPATITIS C AB TEST: CPT | Performed by: PHYSICIAN ASSISTANT

## 2025-05-20 PROCEDURE — 83036 HEMOGLOBIN GLYCOSYLATED A1C: CPT | Performed by: PSYCHIATRY & NEUROLOGY

## 2025-05-20 PROCEDURE — 84443 ASSAY THYROID STIM HORMONE: CPT | Performed by: PSYCHIATRY & NEUROLOGY

## 2025-05-20 RX ORDER — LEVOTHYROXINE SODIUM 25 UG/1
25 TABLET ORAL
Status: DISCONTINUED | OUTPATIENT
Start: 2025-05-21 | End: 2025-05-21 | Stop reason: HOSPADM

## 2025-05-20 RX ORDER — ONDANSETRON 8 MG/1
8 TABLET, ORALLY DISINTEGRATING ORAL EVERY 8 HOURS PRN
Status: DISCONTINUED | OUTPATIENT
Start: 2025-05-20 | End: 2025-05-21 | Stop reason: HOSPADM

## 2025-05-20 RX ORDER — ACETAMINOPHEN 325 MG/1
650 TABLET ORAL EVERY 6 HOURS PRN
Status: DISCONTINUED | OUTPATIENT
Start: 2025-05-20 | End: 2025-05-21 | Stop reason: HOSPADM

## 2025-05-20 RX ORDER — BISACODYL 10 MG/1
10 SUPPOSITORY RECTAL DAILY PRN
Status: DISCONTINUED | OUTPATIENT
Start: 2025-05-20 | End: 2025-05-21 | Stop reason: HOSPADM

## 2025-05-20 RX ORDER — SODIUM CHLORIDE 0.9 % (FLUSH) 0.9 %
10 SYRINGE (ML) INJECTION
Status: DISCONTINUED | OUTPATIENT
Start: 2025-05-20 | End: 2025-05-21 | Stop reason: HOSPADM

## 2025-05-20 RX ORDER — LABETALOL HCL 20 MG/4 ML
10 SYRINGE (ML) INTRAVENOUS
Status: DISCONTINUED | OUTPATIENT
Start: 2025-05-20 | End: 2025-05-21 | Stop reason: HOSPADM

## 2025-05-20 RX ORDER — CALCIUM CARBONATE 200(500)MG
500 TABLET,CHEWABLE ORAL 3 TIMES DAILY PRN
Status: DISCONTINUED | OUTPATIENT
Start: 2025-05-20 | End: 2025-05-21 | Stop reason: HOSPADM

## 2025-05-20 RX ORDER — POLYETHYLENE GLYCOL 3350 17 G/17G
17 POWDER, FOR SOLUTION ORAL DAILY
Status: DISCONTINUED | OUTPATIENT
Start: 2025-05-20 | End: 2025-05-21 | Stop reason: HOSPADM

## 2025-05-20 RX ADMIN — APIXABAN 5 MG: 5 TABLET, FILM COATED ORAL at 08:05

## 2025-05-20 RX ADMIN — ACETAMINOPHEN 650 MG: 325 TABLET ORAL at 08:05

## 2025-05-20 RX ADMIN — IOHEXOL 100 ML: 350 INJECTION, SOLUTION INTRAVENOUS at 07:05

## 2025-05-20 NOTE — NURSING
Patient Transferred to NPU Room 953 @1715      Upon arrival to the floor, patient greeted and oriented to room. Complete head to toe assessment completed per protocol. VSS, see flowsheet for details. Neuro assessment completed. Cardiac monitoring orders reviewed. Patient added to tele monitor in nursing station, rate and rhythm verified. Primary team notified of patient's transfer to floor. All current and transfer orders reviewed/reconciled per protocol. All emergency equipment set up in patient's room. Fall/seizure precautions initiated per providers orders. 4 Eyes skin assessment performed, see flowsheets for details. Reviewed assessment and rounding frequency with patient and family. Questions were encouraged and addressed. Repositioned patient for comfort with bed locked in lowest position, side rails up x 3, bed alarm set, and call light within reach. Instructed patient to call staff for mobility/assistance, verbalized understanding. No acute signs of distress noted at this time.   NIHSS assessment completed on floor arrival. Patient's NIHSS score is 2 at this time. SCDs applied to patient per provider's orders. Grossman bedside swallow screen completed per stroke protocol. Patient has passed grossman bedside swallow screen, team notified of results. Stroke book individualized to patient and given to patient upon transfer. Reviewed stroke book with the patient at the bedside, see education flowsheets for details.

## 2025-05-20 NOTE — ED PROVIDER NOTES
Encounter Date: 5/20/2025       History     Chief Complaint   Patient presents with    Weakness     Daughter states pt started slurring her words last night. No slurred speech in triage, no facial droop, equal  strength      89-year-old female with a history of CVA, carotid endarterectomy, and TAVR presents with slurred speech.  Started yesterday around 6:00 p.m..  No previous symptoms.  She has chronic left-sided deficits from her previous CVA.  Daughter says the systems persist in the emergency department.  Patient denies nausea, vomiting, diarrhea, fever, cough, shortness of breath, chest pain, abdominal pain, or dysuria.  The patients available PMH, PSH, Social History, medications, allergies, and triage vital signs were reviewed just prior to their medical evaluation.         Review of patient's allergies indicates:   Allergen Reactions    Codeine Nausea And Vomiting    Crestor [rosuvastatin] Other (See Comments)     Muscle weakness    Fosamax [alendronate] Other (See Comments)     Didn't tolerate over 10 years ago and doesn't remember what happened.     Livalo [pitavastatin] Other (See Comments)     Muscle pain    Simvastatin Other (See Comments)     Leg pains/ weakness     Past Medical History:   Diagnosis Date    Syncope and collapse      Past Surgical History:   Procedure Laterality Date    ADENOIDECTOMY      AORTIC VALVE REPLACEMENT      APPENDECTOMY      CARDIAC VALVE REPLACEMENT      CARDIAC VALVE SURGERY      CORONARY ARTERY BYPASS GRAFT      HYSTERECTOMY      TONSILLECTOMY       Family History   Problem Relation Name Age of Onset    Cancer Mother       Social History[1]  Review of Systems    Physical Exam     Initial Vitals [05/20/25 0821]   BP Pulse Resp Temp SpO2   (!) 195/83 77 16 97.4 °F (36.3 °C) 97 %      MAP       --         Physical Exam    Nursing note and vitals reviewed.  Constitutional: She appears well-developed and well-nourished. She is not diaphoretic. No distress.   HENT:   Head:  Normocephalic and atraumatic.   Nose: Nose normal. Mouth/Throat: Oropharynx is clear and moist. No oropharyngeal exudate.   Eyes: Conjunctivae are normal. Right eye exhibits no discharge. Left eye exhibits no discharge.   Neck: Neck supple.   Normal range of motion.  Cardiovascular:  Normal rate and normal heart sounds.     Exam reveals no gallop and no friction rub.       No murmur heard.  Irregular irregular   Pulmonary/Chest: Breath sounds normal. No respiratory distress. She has no wheezes. She has no rhonchi. She has no rales.   Abdominal: Abdomen is soft. She exhibits no distension. There is no abdominal tenderness. There is no rebound and no guarding.   Musculoskeletal:         General: No tenderness or edema. Normal range of motion.      Cervical back: Normal range of motion and neck supple.     Neurological: She is alert and oriented to person, place, and time. GCS score is 15. GCS eye subscore is 4. GCS verbal subscore is 5. GCS motor subscore is 6.   Mild slurred speech, no tongue deviation, controlling secretions, chronic left sided defects.    Skin: Skin is warm and dry. No rash noted. No erythema.   Psychiatric: She has a normal mood and affect. Her behavior is normal. Judgment and thought content normal.         ED Course   Procedures  Labs Reviewed   COMPREHENSIVE METABOLIC PANEL - Abnormal       Result Value    Sodium 138      Potassium 3.9      Chloride 105      CO2 23      Glucose 105      BUN 14      Creatinine 0.8      Calcium 9.7      Protein Total 7.7      Albumin 4.0      Bilirubin Total 1.5 (*)           AST 17      ALT 12      Anion Gap 10      eGFR >60     CBC WITH DIFFERENTIAL - Abnormal    WBC 2.96 (*)     RBC 4.71      HGB 14.4      HCT 46.0      MCV 98      MCH 30.6      MCHC 31.3 (*)     RDW 13.4      Platelet Count 140 (*)     MPV 12.1      Nucleated RBC 0      Neut % 54.5      Lymph % 32.4      Mono % 9.8      Eos % 0.3      Basophil % 0.3      Imm Grans % 2.7 (*)     Neut #  1.61 (*)     Lymph # 0.96 (*)     Mono # 0.29 (*)     Eos # 0.01      Baso # 0.01      Imm Grans # 0.08 (*)    HEPATITIS C ANTIBODY - Normal    Hep C Ab Interp Non-Reactive     HIV 1 / 2 ANTIBODY - Normal    HIV 1/2 Ag/Ab Non-Reactive     MAGNESIUM - Normal    Magnesium  2.0     URINALYSIS, REFLEX TO URINE CULTURE - Normal    Color, UA Yellow      Appearance, UA Clear      pH, UA 6.0      Spec Grav UA 1.010      Protein, UA Negative      Glucose, UA Negative      Ketones, UA Negative      Bilirubin, UA Negative      Blood, UA Negative      Nitrites, UA Negative      Urobilinogen, UA Negative      Leukocyte Esterase, UA Negative     HEP C VIRUS HOLD SPECIMEN    Extra Tube Hold for add-ons.     CBC W/ AUTO DIFFERENTIAL    Narrative:     The following orders were created for panel order CBC auto differential.  Procedure                               Abnormality         Status                     ---------                               -----------         ------                     CBC with Differential[[id number]]       Abnormal            Final result                 Please view results for these tests on the individual orders.   GRE[place] URINE HOLD    Extra Tube Hold for add-ons.       EKG Readings: (Independently Interpreted)   Initial Reading: No STEMI. Rhythm: Atrial Fibrillation. Heart Rate: 71. Ectopy: No Ectopy. Conduction: LBBB. ST Segments: Normal ST Segments. T Waves: Normal.     ECG Results              EKG 12-lead (Final result)        Collection Time Result Time QRS Duration OHS QTC Calculation    05/20/25 09:36:31 05/20/25 11:44:28 154 491                     Final result by Interface, Lab In [facility] (05/20/25 11:44:37)                   Narrative:    Test Reason :    Vent. Rate :  71 BPM     Atrial Rate :  86 BPM     P-R Int :    ms          QRS Dur : 154 ms      QT Int : 452 ms       P-R-T Axes :    -50  88 degrees    QTcB Int : 491 ms    Atrial fibrillation  Left axis deviation  Left bundle branch  block  Abnormal ECG  When compared with ECG of 20-May-2025 08:29,  The axis Shifted left  T wave inversion no longer evident in Inferior leads  Confirmed by Raul Reyes ([id number]) on 5/20/2025 11:44:26 AM    Referred By: AAAREFERRAL SELF           Confirmed By: Raul Reyes                                     EKG 12-lead (Final result)        Collection Time Result Time QRS Duration OHS QTC Calculation    05/20/25 08:29:20 05/20/25 09:09:45 154 476                     Final result by Interface, Lab In [facility] (05/20/25 09:09:54)                   Narrative:    Test Reason : R00.1,    Vent. Rate :  63 BPM     Atrial Rate :    BPM     P-R Int :    ms          QRS Dur : 154 ms      QT Int : 466 ms       P-R-T Axes :     84 -34 degrees    QTcB Int : 476 ms    Atrial fibrillation  Nonspecific intraventricular block  Minimal voltage criteria for LVH, may be normal variant ( [place] product )  Abnormal ECG  When compared with ECG of 28-Jun-2022 14:56,  The axis Shifted right  T wave inversion now evident in Inferior leads  Confirmed by Raul Reyes ([id number]) on 5/20/2025 9:09:40 AM    Referred By:            Confirmed By: Raul Reyes                                  Imaging Results               MRI Brain Without Contrast (Final result)  Result time 05/20/25 12:38:52      Final result by Tra Blandon DO (05/20/25 12:38:52)                   Impression:      Small acute infarction right posterior frontal lobe.  No significant mass effect or hemorrhagic conversion    Superimposed cerebral volume loss with patchy and confluent T2 FLAIR signal abnormality supratentorial white matter concerning for underlying chronic ischemic change    There is superimposed remote infarction left occipital lobe with encephalomalacia with small left thalamic and punctate left cerebellar remote infarcts    Otherwise motion distorted examination as detailed above    Clinical correlation and follow-up advised      Electronically signed by: Tra Blandon  DO  Date:    05/20/2025  Time:    12:38               Narrative:    EXAMINATION:  MRI BRAIN WITHOUT CONTRAST    CLINICAL HISTORY:  Transient ischemic attack (TIA);    TECHNIQUE:  Sagittal and axial T1, axial T2, axial FLAIR, axial gradient and axial diffusion imaging of the whole brain without contrast.    COMPARISON:  None    FINDINGS:  Study is distorted by motion artifacts.  Small area of restriction within the right posterior frontal lobe concerning for acute/recent infarction.  No significant mass effect or susceptibility to suggest hemorrhagic conversion.  There is punctate partial FLAIR hyperintensity in this region    Superimposed generalized cerebral volume loss.    Patchy and confluent T2 FLAIR signal hyperintensity elsewhere supratentorial white matter while nonspecific concerning for underlying chronic ischemic change there is superimposed encephalomalacia in the left occipital lobe suggestive for prior infarction    Punctate encephalomalacia left cerebellum suggestive for remote infarction    Allowing for motion artifacts no definite abnormal parenchymal susceptibility although severely distorted by motion artifacts.  Major skull base T2 flow voids are present    Small remote left thalamic lacunar type infarct    This report was flagged in Epic as abnormal.                                       Medications - No data to display  Medical Decision Making  89-year-old female presents with slurred speech.  Vitals with hypertension.  Physical exam as above.  EKG unremarkable.  Labs unremarkable.  MRI shows new small acute infarct.  Will admit to vascular Neurology.  Did bedside teaching.  All questions answered.  Patient acknowledges understanding.     Amount and/or Complexity of Data Reviewed  Labs: ordered. Decision-making details documented in ED Course.  Radiology: ordered. Decision-making details documented in ED Course.  ECG/medicine tests: ordered and independent interpretation performed.  Decision-making details documented in ED Course.    Risk  Decision regarding hospitalization.                     Critical Care:  Date: 05/20/2025  Performed by: Dr. Lorenzo Simon   Authorized by: Dr. Lorenzo Simon  Total critical care time (exclusive of procedural time) : 35 minutes  Critical care was necessary to treat or prevent imminent or life-threatening deterioration of the following conditions:  cva                  Clinical Impression:  Final diagnoses:  [R00.1] Bradycardia  [I63.9] Cerebrovascular accident (CVA), unspecified mechanism (Primary)  [R47.81] Slurred speech          ED Disposition Condition    Admit                       [1]   Social History  Tobacco Use    Smoking status: Never    Smokeless tobacco: Never   Substance Use Topics    Alcohol use: Not Currently     Alcohol/week: 0.0 standard drinks of alcohol    Drug use: No        Lorenzo Simon MD  05/20/25 9311

## 2025-05-20 NOTE — SUBJECTIVE & OBJECTIVE
Past Medical History:   Diagnosis Date    Syncope and collapse      Past Surgical History:   Procedure Laterality Date    ADENOIDECTOMY      AORTIC VALVE REPLACEMENT      APPENDECTOMY      CARDIAC VALVE REPLACEMENT      CARDIAC VALVE SURGERY      CORONARY ARTERY BYPASS GRAFT      HYSTERECTOMY      TONSILLECTOMY       Social History[1]  Review of patient's allergies indicates:   Allergen Reactions    Codeine Nausea And Vomiting    Crestor [rosuvastatin] Other (See Comments)     Muscle weakness    Fosamax [alendronate] Other (See Comments)     Didn't tolerate over 10 years ago and doesn't remember what happened.     Livalo [pitavastatin] Other (See Comments)     Muscle pain    Simvastatin Other (See Comments)     Leg pains/ weakness       Medications: I have reviewed the current medication administration record.    Prescriptions Prior to Admission[2]    Review of Systems   Respiratory: Negative.     Cardiovascular: Negative.    Skin: Negative.    Neurological:  Positive for speech difficulty. Negative for dizziness and headaches.     Objective:     Vital Signs (Most Recent):  Temp: 97.4 °F (36.3 °C) (05/20/25 0830)  Pulse: 69 (05/20/25 1147)  Resp: 20 (05/20/25 1147)  BP: (!) 160/71 (05/20/25 1130)  SpO2: (!) 94 % (05/20/25 1147)    Vital Signs Range (Last 24H):  Temp:  [97.4 °F (36.3 °C)-97.6 °F (36.4 °C)]   Pulse:  [52-77]   Resp:  [16-20]   BP: (155-207)/(71-84)   SpO2:  [94 %-99 %]        Physical Exam  Vitals and nursing note reviewed.   HENT:      Mouth/Throat:      Mouth: Mucous membranes are moist.   Eyes:      Pupils: Pupils are equal, round, and reactive to light.   Cardiovascular:      Rate and Rhythm: Normal rate.   Pulmonary:      Effort: No respiratory distress.   Skin:     General: Skin is warm and dry.   Neurological:      Mental Status: She is alert and oriented to person, place, and time.              Neurological Exam:   LOC: alert  Attention Span: Good   Language: No aphasia  Articulation:  "Dysarthria  Orientation: Person, Place, Time   Visual Fields: Full  Facial Movement (CN VII): Lower facial weakness on the Left  Motor: Arm left  Normal 5/5  Leg left  Normal 5/5  Arm right  Normal 5/5  Leg right Normal 5/5  Sensation: Intact to light touch, temperature and vibration      Laboratory:  CMP:   Recent Labs   Lab 05/20/25  0941   CALCIUM 9.7   ALBUMIN 4.0   PROT 7.7      K 3.9   CO2 23      BUN 14   CREATININE 0.8   ALKPHOS 101   ALT 12   AST 17   BILITOT 1.5*     CBC:   Recent Labs   Lab 05/20/25  0941   WBC 2.96*   RBC 4.71   HGB 14.4   HCT 46.0   *   MCV 98   MCH 30.6   MCHC 31.3*     Lipid Panel: No results for input(s): "CHOL", "LDLCALC", "HDL", "TRIG" in the last 168 hours.  Coagulation: No results for input(s): "PT", "INR", "APTT" in the last 168 hours.  Hgb A1C: No results for input(s): "HGBA1C" in the last 168 hours.  TSH: No results for input(s): "TSH" in the last 168 hours.    Diagnostic Results:      Brain imaging:  MRI Brain 5/20/25  Impression:     Small acute infarction right posterior frontal lobe.  No significant mass effect or hemorrhagic conversion     Superimposed cerebral volume loss with patchy and confluent T2 FLAIR signal abnormality supratentorial white matter concerning for underlying chronic ischemic change     There is superimposed remote infarction left occipital lobe with encephalomalacia with small left thalamic and punctate left cerebellar remote infarcts.    Vessel Imaging:  PENDING    Cardiac Evaluation:     EKG 5/20/25  A fib   Rate 71 BPM       [1]   Social History  Tobacco Use    Smoking status: Never    Smokeless tobacco: Never   Substance Use Topics    Alcohol use: Not Currently     Alcohol/week: 0.0 standard drinks of alcohol    Drug use: No   [2] (Not in a hospital admission)    "

## 2025-05-20 NOTE — PLAN OF CARE
Problem: Skin Injury Risk Increased  Goal: Skin Health and Integrity  Outcome: Progressing     Problem: Stroke, Ischemic (Includes Transient Ischemic Attack)  Goal: Optimal Cerebral Tissue Perfusion  Outcome: Progressing  Goal: Improved Communication Skills  Outcome: Progressing  Goal: Optimal Functional Ability  Outcome: Progressing  Goal: Improved Sensorimotor Function  Outcome: Progressing  Goal: Safe and Effective Swallow  Outcome: Progressing     Problem: Fall Injury Risk  Goal: Absence of Fall and Fall-Related Injury  Outcome: Progressing    Stroke booklet @bedside Refer to:  ROSELIA Pickett, Dermatology  [phone] w/pt/family. Verbalized understanding

## 2025-05-20 NOTE — ED TRIAGE NOTES
Arrives via POV with daughter at bedside stating she noticed that her mother had some slurred speech last night and that it continued into the morning. Pt has hx of stroke with baseline left facial droop and weakness. Pt denies any current pain

## 2025-05-20 NOTE — ASSESSMENT & PLAN NOTE
-Stroke risk factor  -SBP goal <220, maintain MAP >65  -BP range in the last 24 hrs: BP  Min: 155/77  Max: 207/84  -Current antihypertensive regimen:   --PRN labetalol/hydralazine

## 2025-05-20 NOTE — ED NOTES
[facility]  Expected date:   Expected time:   Means of arrival:   Comments:

## 2025-05-20 NOTE — HPI
Damari Grant is a 89 y.o. female with PMH of Afib (on Eliquis), CAD, prior stroke (no residual deficits) that presents to the ED c/o slurred speech and left facial droop. LKW 5/19/25 at 1800, approx 23 hrs PTA. Pt's daughter at bedside reports patient choked while eating yesterday evening (5/19/25) , followed by slurred speech and left facial droop. She takes Eliquis daily, but missed 5/19 AM dose because she had not yet filled new prescription. She denies unilateral numbness, weakness, vision changes. Neuro exam significant for dysarthria and LFD at rest. NIHSS 3. MRI Brain revealing for small acute infarction right posterior frontal lobe. Determined not a candidate for acute stroke interventions, no TNK due to daily Eliquis use, and no thrombectomy due to symptoms not suggestive of LVO. Patient will be admitted to vascular neurology service/NPU for further evaluation and stroke workup.

## 2025-05-20 NOTE — ASSESSMENT & PLAN NOTE
Damari Grant is a 89 y.o. female with PMH of Afib (on Eliquis), CAD, prior stroke (no residual deficits) that presents to the ED c/o slurred speech and left facial droop. LKW 5/19/25 at 1800, approx 23 hrs PTA. Pt's daughter at bedside reports patient choked while eating yesterday evening (5/19/25) , followed by slurred speech and left facial droop. She takes Eliquis daily, but missed 5/19 AM dose because she had not yet filled new prescription. She denies unilateral numbness, weakness, vision changes. Neuro exam significant for dysarthria and LFD at rest. NIHSS 3. MRI Brain revealing for small acute infarction right posterior frontal lobe. Determined not a candidate for acute stroke interventions, no TNK due to daily Eliquis use, and no thrombectomy due to symptoms not suggestive of LVO. Patient will be admitted to vascular neurology service/NPU for further evaluation and stroke workup.     Antithrombotics for secondary stroke prevention: Anticoagulants: Apixaban 5 mg BID     Statins for secondary stroke prevention and hyperlipidemia, if present:   Statins: Statins contraindicated due to allergy    Aggressive risk factor modification: A-Fib, CAD     Rehab efforts: The patient has been evaluated by a stroke team provider and the therapy needs have been fully considered based off the presenting complaints and exam findings. The following therapy evaluations are needed: PT evaluate and treat, OT evaluate and treat, SLP evaluate and treat    Diagnostics ordered/pending: HgbA1C to assess blood glucose levels, Lipid Profile to assess cholesterol levels, MRA head to assess vasculature, MRA neck/arch to assess vasculature, TTE to assess cardiac function/status , TSH to assess thyroid function    VTE prophylaxis: None: Reason for No Pharmacological VTE Prophylaxis: Currently on anticoagulation    BP parameters: Infarct: No intervention, SBP <220

## 2025-05-21 VITALS
HEART RATE: 82 BPM | OXYGEN SATURATION: 93 % | TEMPERATURE: 99 F | RESPIRATION RATE: 17 BRPM | DIASTOLIC BLOOD PRESSURE: 85 MMHG | HEIGHT: 65 IN | SYSTOLIC BLOOD PRESSURE: 152 MMHG | WEIGHT: 164 LBS | BODY MASS INDEX: 27.32 KG/M2

## 2025-05-21 PROBLEM — Z79.01 CHRONIC ANTICOAGULATION: Status: ACTIVE | Noted: 2025-05-21

## 2025-05-21 PROBLEM — I48.91 HYPERCOAGULABILITY DUE TO ATRIAL FIBRILLATION: Status: ACTIVE | Noted: 2025-05-21

## 2025-05-21 PROBLEM — D68.69 HYPERCOAGULABILITY DUE TO ATRIAL FIBRILLATION: Status: ACTIVE | Noted: 2025-05-21

## 2025-05-21 PROBLEM — I50.22 CHRONIC SYSTOLIC HEART FAILURE: Status: ACTIVE | Noted: 2025-05-21

## 2025-05-21 LAB
ABSOLUTE NEUTROPHIL MANUAL (OHS): 2.3 K/UL
ALBUMIN SERPL BCP-MCNC: 3.7 G/DL (ref 3.5–5.2)
ALP SERPL-CCNC: 95 UNIT/L (ref 40–150)
ALT SERPL W/O P-5'-P-CCNC: 11 UNIT/L (ref 10–44)
ANION GAP (OHS): 10 MMOL/L (ref 8–16)
AORTIC SIZE INDEX (SOV): 1.1 CM/M2
APTT PPP: 35.6 SECONDS (ref 21–32)
AST SERPL-CCNC: 15 UNIT/L (ref 11–45)
AV AREA BY CONTINUOUS VTI: 1.2 CM2
AV INDEX (PROSTH): 0.36
AV LVOT MEAN GRADIENT: 2 MMHG
AV LVOT PEAK GRADIENT: 3 MMHG
AV MEAN GRADIENT: 15 MMHG
AV PEAK GRADIENT: 27 MMHG
AV VALVE AREA BY VELOCITY RATIO: 1.1 CM²
AV VALVE AREA: 1.1 CM2
AV VELOCITY RATIO: 0.35
BILIRUB SERPL-MCNC: 1.9 MG/DL (ref 0.1–1)
BSA FOR ECHO PROCEDURE: 1.85 M2
BUN SERPL-MCNC: 10 MG/DL (ref 10–30)
CALCIUM SERPL-MCNC: 9.6 MG/DL (ref 8.7–10.5)
CHLORIDE SERPL-SCNC: 105 MMOL/L (ref 95–110)
CO2 SERPL-SCNC: 22 MMOL/L (ref 23–29)
CREAT SERPL-MCNC: 0.7 MG/DL (ref 0.5–1.4)
CV ECHO LV RWT: 0.65 CM
DOP CALC AO PEAK VEL: 2.6 M/S
DOP CALC AO VTI: 51.3 CM
DOP CALC LVOT AREA: 3.1 CM2
DOP CALC LVOT DIAMETER: 2 CM
DOP CALC LVOT PEAK VEL: 0.9 M/S
DOP CALC LVOT STROKE VOLUME: 57.8 CM3
DOP CALCLVOT PEAK VEL VTI: 18.4 CM
E/E' RATIO: 24 M/S
ECHO EF ESTIMATED: 54 %
ECHO LV POSTERIOR WALL: 1.1 CM (ref 0.6–1.1)
ERYTHROCYTE [DISTWIDTH] IN BLOOD BY AUTOMATED COUNT: 13.4 % (ref 11.5–14.5)
FRACTIONAL SHORTENING: 26.5 % (ref 28–44)
GFR SERPLBLD CREATININE-BSD FMLA CKD-EPI: >60 ML/MIN/1.73/M2
GLUCOSE SERPL-MCNC: 101 MG/DL (ref 70–110)
HCT VFR BLD AUTO: 43.7 % (ref 37–48.5)
HGB BLD-MCNC: 14 GM/DL (ref 12–16)
INR PPP: 1.2 (ref 0.8–1.2)
INTERVENTRICULAR SEPTUM: 1.3 CM (ref 0.6–1.1)
LEFT ATRIUM SIZE: 4.3 CM
LEFT ATRIUM VOLUME INDEX MOD: 43 ML/M2
LEFT ATRIUM VOLUME MOD: 79 ML
LEFT INTERNAL DIMENSION IN SYSTOLE: 2.5 CM (ref 2.1–4)
LEFT VENTRICLE DIASTOLIC VOLUME INDEX: 26.92 ML/M2
LEFT VENTRICLE DIASTOLIC VOLUME: 49 ML
LEFT VENTRICLE MASS INDEX: 71.6 G/M2
LEFT VENTRICLE SYSTOLIC VOLUME INDEX: 12.1 ML/M2
LEFT VENTRICLE SYSTOLIC VOLUME: 22 ML
LEFT VENTRICULAR INTERNAL DIMENSION IN DIASTOLE: 3.4 CM (ref 3.5–6)
LEFT VENTRICULAR MASS: 130.2 G
LV LATERAL E/E' RATIO: 19.6 M/S
LV SEPTAL E/E' RATIO: 31.4 M/S
LYMPHOCYTES NFR BLD MANUAL: 23 % (ref 18–48)
MAGNESIUM SERPL-MCNC: 1.9 MG/DL (ref 1.6–2.6)
MCH RBC QN AUTO: 31.1 PG (ref 27–31)
MCHC RBC AUTO-ENTMCNC: 32 G/DL (ref 32–36)
MCV RBC AUTO: 97 FL (ref 82–98)
MONOCYTES NFR BLD MANUAL: 5 % (ref 4–15)
MV PEAK E VEL: 1.57 M/S
NEUTROPHILS NFR BLD MANUAL: 72 % (ref 38–73)
NUCLEATED RBC (/100WBC) (OHS): 0 /100 WBC
PHOSPHATE SERPL-MCNC: 3.2 MG/DL (ref 2.7–4.5)
PISA TR MAX VEL: 3.7 M/S
PLATELET # BLD AUTO: 128 K/UL (ref 150–450)
PMV BLD AUTO: 12.2 FL (ref 9.2–12.9)
POTASSIUM SERPL-SCNC: 4.2 MMOL/L (ref 3.5–5.1)
PROT SERPL-MCNC: 7.2 GM/DL (ref 6–8.4)
PROTHROMBIN TIME: 13.1 SECONDS (ref 9–12.5)
RA PRESSURE ESTIMATED: 3 MMHG
RBC # BLD AUTO: 4.5 M/UL (ref 4–5.4)
RV TB RVSP: 7 MMHG
SINUS: 2.09 CM
SODIUM SERPL-SCNC: 137 MMOL/L (ref 136–145)
STJ: 2.2 CM
TDI LATERAL: 0.08 M/S
TDI SEPTAL: 0.05 M/S
TDI: 0.07 M/S
TROPONIN I SERPL HS-MCNC: 14 NG/L
TV PEAK GRADIENT: 55 MMHG
TV REST PULMONARY ARTERY PRESSURE: 58 MMHG
WBC # BLD AUTO: 3.16 K/UL (ref 3.9–12.7)
Z-SCORE OF LEFT VENTRICULAR DIMENSION IN END DIASTOLE: -3.93
Z-SCORE OF LEFT VENTRICULAR DIMENSION IN END SYSTOLE: -1.73

## 2025-05-21 PROCEDURE — 85730 THROMBOPLASTIN TIME PARTIAL: CPT

## 2025-05-21 PROCEDURE — 97161 PT EVAL LOW COMPLEX 20 MIN: CPT

## 2025-05-21 PROCEDURE — 85610 PROTHROMBIN TIME: CPT

## 2025-05-21 PROCEDURE — 36415 COLL VENOUS BLD VENIPUNCTURE: CPT

## 2025-05-21 PROCEDURE — 97116 GAIT TRAINING THERAPY: CPT

## 2025-05-21 PROCEDURE — 97165 OT EVAL LOW COMPLEX 30 MIN: CPT

## 2025-05-21 PROCEDURE — 25000003 PHARM REV CODE 250

## 2025-05-21 PROCEDURE — 92610 EVALUATE SWALLOWING FUNCTION: CPT

## 2025-05-21 PROCEDURE — 83735 ASSAY OF MAGNESIUM: CPT

## 2025-05-21 PROCEDURE — 97535 SELF CARE MNGMENT TRAINING: CPT

## 2025-05-21 PROCEDURE — 85025 COMPLETE CBC W/AUTO DIFF WBC: CPT

## 2025-05-21 PROCEDURE — 97530 THERAPEUTIC ACTIVITIES: CPT

## 2025-05-21 PROCEDURE — 84100 ASSAY OF PHOSPHORUS: CPT

## 2025-05-21 PROCEDURE — 80053 COMPREHEN METABOLIC PANEL: CPT

## 2025-05-21 PROCEDURE — 84484 ASSAY OF TROPONIN QUANT: CPT

## 2025-05-21 PROCEDURE — 92523 SPEECH SOUND LANG COMPREHEN: CPT

## 2025-05-21 RX ADMIN — APIXABAN 5 MG: 5 TABLET, FILM COATED ORAL at 09:05

## 2025-05-21 RX ADMIN — LEVOTHYROXINE SODIUM 25 MCG: 25 TABLET ORAL at 06:05

## 2025-05-21 NOTE — PLAN OF CARE
POC established and functional mobility goals were created to help pt return to PLOF. Will be reassessed as appropriate to measure pt progress.    Problem: Physical Therapy  Goal: Physical Therapy Goal  Description: Goals to be met by: 25     Patient will increase functional independence with mobility by performin. Sit to stand transfer with Modified [place]  2. Bed to chair transfer with Modified [place] using rollator  3. Gait  x 150 feet with Modified [place] using rollator.   4. Ascend/descend 4 stair with right Handrails with Modified [place]  5. Lower extremity exercise program x10 reps per handout, with assistance as needed    Outcome: Progressing

## 2025-05-21 NOTE — PLAN OF CARE
Problem: Skin Injury Risk Increased  Goal: Skin Health and Integrity  Outcome: Progressing     Problem: Stroke, Ischemic (Includes Transient Ischemic Attack)  Goal: Optimal Coping  Outcome: Progressing  Goal: Optimal Cerebral Tissue Perfusion  Outcome: Progressing  Goal: Optimal Cognitive Function  Outcome: Progressing  Goal: Improved Communication Skills  Outcome: Progressing  Goal: Safe and Effective Swallow  Outcome: Progressing     Problem: Fall Injury Risk  Goal: Absence of Fall and Fall-Related Injury  Outcome: Progressing    POC reviewed with patient and family, alert and oriented x4, bed in low position, bed alarm on, daughter at bedside, patient repositions self, patient dry and intact, patient slurs speech at times, swallows without difficulty. Call light in reach.

## 2025-05-21 NOTE — NURSING
Telemetry tech called to inform that patient HR carlos down to 39. When assessed, HR up to 61 bpm, stroke team notified and made aware. Patient asleep arouses to verbal stimuli, asymptomatic, will continue to monitor.

## 2025-05-21 NOTE — PLAN OF CARE
[facility] - Neurosurgery (Hospital)  Discharge Final Note    Primary Care Provider: Milo Hogan MD    Expected Discharge Date: 5/21/2025      Future Appointments   Date Time Provider Department Center   5/27/2025 11:30 AM lAfredo Coffey MD [facility] IM [place]   7/8/2025 12:40 PM SPECIMEN LAB, [facility] [place]   7/8/2025  1:00 PM CV [facility] E[facility] [place]   7/11/2025 10:00 AM Alexei Langley Jr., MD [facility] [place]          Final Discharge Note (most recent)       Final Note - 05/21/25 1311          Final Note    Assessment Type Final Discharge Note     Anticipated Discharge Disposition Home or Self Care     What phone number can be called within the next 1-3 days to see how you are doing after discharge? [phone]     Hospital Resources/Appts/Education Provided Appointments scheduled and added to AVS                     Important Message from Medicare             Contact Info       Milo Hogan MD   Specialty: Family Medicine   Relationship: PCP - General    [address] [facility]  [place] [address]   Phone: [phone]       Next Steps: Follow up            Unique Pérez RN  Ext [phone]

## 2025-05-21 NOTE — NURSING
Iv tele removed. Dc instructions rw w/pt /family.no meds to deliver @ bedside.pt dc  home with home health

## 2025-05-21 NOTE — CONSULTS
Inpatient consult to Physical Medicine Rehab  Consult performed by: Lexi Ac NP  Consult ordered by: Barby Graham NP      Consult received.     Lexi Ac NP  Physical Medicine & Rehabilitation   05/21/2025

## 2025-05-21 NOTE — CARE UPDATE
I have reviewed the chart of Damarijitendra eLvidenis who is hospitalized for the following:    Active Hospital Problems    Diagnosis    *Embolic stroke involving right middle cerebral artery    Hypercoagulability due to atrial fibrillation     On eliquis      Chronic anticoagulation     Eliquis      Chronic systolic heart failure     low normal systolic function with a visually estimated ejection fraction of 50 - 55%.   Follow up with cardiology      Thrombocytopenia     Monitor with daily cbc      Acquired hypothyroidism    Pure hypercholesterolemia    Chronic atrial fibrillation    Essential hypertension        Joy Guevara NP  [facility]

## 2025-05-21 NOTE — ASSESSMENT & PLAN NOTE
-Stroke risk factor  -LDL 27 (12/10/24) , goal <70  -Atorvastatin HELD 2/2 reported allergy  - can continue holding atorvastatin and patient will follow up her lipid profile as outpatient with vascular neurology

## 2025-05-21 NOTE — PLAN OF CARE
Problem: SLP  Goal: SLP Goal  Description: Speech Language Pathology Goals  Goals expected to be met by 5/28/25    1. Pt will tolerate least restrictive diet without overt S/S aspiration  2. Pt will recall 3+ clear speech strategies 90% of attempts, MOD I  3. Pt will repeat short sentences with 90% intelligibility or higher, MOD I  4. Educate Pt and family on aspiration precautions and SLP POC    Outcome: Progressing     SLP Evaluation initiated. Pt presents with Dysphagia and Dysarthria. Full report to follow. REC: continue current Level VI soft and bites sized textures, thin liquids, provided Pt is awake/alert/attentive, upright, one bite/sip at a time, single bites/sips, monitor for pocketing and/or S/S aspiration. Pt would benefit from ongoing  assistance with any money/schedule/time management tasks and ongoing ST upon d/c from acute.      5/21/2025

## 2025-05-21 NOTE — NURSING
Patient presenting with bradycardia on Telemetry monitor with HR down to 41-42. Stroke team notified, will continue to monitor. Listened to Apical pulse at 77 bpm.

## 2025-05-21 NOTE — HOSPITAL COURSE
05/21/2025 patient has improved back to baseline with the resumption of her home eliquis as means of anticoagulation. Patient evaluated by OT deemed without therapy needs. Patient is pending speech and official PT evaluation then will be discharged home in the absence of atorvastatin as she is allergic to it. LDL is 43 within target. As per speech: soft and bite sized diet to be continued with speech therapy low intensity as outpatient. Patient favors home health. She is stable for discharge from medical standpoint with [facility] neuro and speech as home health basis.

## 2025-05-21 NOTE — SUBJECTIVE & OBJECTIVE
Neurologic Chief Complaint: small acute infarction right posterior frontal lobe     Subjective:     Interval History: Patient is seen for follow-up neurological assessment and treatment recommendations: read hospital course    HPI, Past Medical, Family, and Social History remains the same as documented in the initial encounter.     Review of Systems  Scheduled Meds:   apixaban  5 mg Oral BID    levothyroxine  25 mcg Oral Before breakfast    polyethylene glycol  17 g Oral Daily     Continuous Infusions:  PRN Meds:  Current Facility-Administered Medications:     acetaminophen, 650 mg, Oral, Q6H PRN    bisacodyL, 10 mg, Rectal, Daily PRN    calcium carbonate, 500 mg, Oral, TID PRN    labetalol, 10 mg, Intravenous, Q15 Min PRN    ondansetron, 8 mg, Oral, Q8H PRN    sodium chloride 0.9%, 500 mL, Intravenous, PRN    sodium chloride 0.9%, 10 mL, Intravenous, PRN    Objective:     Vital Signs (Most Recent):  Temp: 97.7 °F (36.5 °C) (05/21/25 0726)  Pulse: 78 (05/21/25 0838)  Resp: 17 (05/21/25 0726)  BP: (!) 155/105 (05/21/25 0726)  SpO2: (!) 92 % (05/21/25 0726)  BP Location: Right arm    Vital Signs Range (Last 24H):  Temp:  [97.6 °F (36.4 °C)-98.6 °F (37 °C)]   Pulse:  [63-84]   Resp:  [15-20]   BP: (135-160)/()   SpO2:  [92 %-94 %]   BP Location: Right arm       Physical Exam  Constitutional:       General: She is not in acute distress.     Appearance: Normal appearance. She is not ill-appearing, toxic-appearing or diaphoretic.   HENT:      Head: Normocephalic and atraumatic.   Eyes:      Pupils: Pupils are equal, round, and reactive to light.   Cardiovascular:      Rate and Rhythm: Normal rate.   Pulmonary:      Effort: Pulmonary effort is normal. No respiratory distress.      Breath sounds: No wheezing.   Abdominal:      General: Bowel sounds are normal. There is no distension.      Palpations: Abdomen is soft.      Tenderness: There is no abdominal tenderness. There is no guarding.   Musculoskeletal:      Right  "lower leg: No edema.      Left lower leg: No edema.   Neurological:      General: No focal deficit present.      Mental Status: She is alert and oriented to person, place, and time.      Cranial Nerves: No cranial nerve deficit.      Motor: No weakness.      Comments: Alert and oriented x4  Speech is coherent  Conversation is appropriate   Psychiatric:         Mood and Affect: Mood normal.         Behavior: Behavior normal.         Thought Content: Thought content normal.         Judgment: Judgment normal.              Neurological Exam:   LOC: alert  Attention Span: Good   Language: No aphasia  Articulation: No dysarthria  Orientation: Person, Place, Time   Visual Fields: Full  EOM (CN III, IV, VI): Full/intact  Pupils (CN II, III): PERRL  Facial Sensation (CN V): Normal  Facial Movement (CN VII): Symmetric facial expression    Reflexes: 2+ throughout  Motor: Arm left  Normal 5/5  Leg left  Normal 5/5  Arm right  Normal 5/5  Leg right Normal 5/5  Cerebellum: No evidence of appendicular or axial ataxia  Sensation: Intact to light touch, temperature and vibration  Tone: Normal tone throughout    Laboratory:  CMP:   Recent Labs   Lab 05/21/25  0508   CALCIUM 9.6   ALBUMIN 3.7   PROT 7.2      K 4.2   CO2 22*      BUN 10   CREATININE 0.7   ALKPHOS 95   ALT 11   AST 15   BILITOT 1.9*     BMP:   Recent Labs   Lab 05/21/25  0508      K 4.2      CO2 22*   BUN 10   CREATININE 0.7   CALCIUM 9.6     CBC:   Recent Labs   Lab 05/21/25  0508   WBC 3.16*   RBC 4.50   HGB 14.0   HCT 43.7   *   MCV 97   MCH 31.1*   MCHC 32.0     Lipid Panel:   Recent Labs   Lab 05/20/25  0941   CHOL 110*   LDLCALC 43.8*   HDL 47   TRIG 96     Coagulation:   Recent Labs   Lab 05/21/25  0508   INR 1.2   APTT 35.6*     Platelet Aggregation Study: No results for input(s): "PLTAGG", "PLTAGINTERP", "PLTAGREGLACO", "ADPPLTAGGREG" in the last 168 hours.  Hgb A1C:   Recent Labs   Lab 05/20/25  0941   HGBA1C 5.5     TSH: "   Recent Labs   Lab 05/20/25  0941   TSH 2.989       Diagnostic Results       Brain imaging:  MRI Brain 5/20/25  Impression:     Small acute infarction right posterior frontal lobe.  No significant mass effect or hemorrhagic conversion     Superimposed cerebral volume loss with patchy and confluent T2 FLAIR signal abnormality supratentorial white matter concerning for underlying chronic ischemic change     There is superimposed remote infarction left occipital lobe with encephalomalacia with small left thalamic and punctate left cerebellar remote infarcts.     Vessel Imaging:  PENDING     Cardiac Evaluation:      EKG 5/20/25  A fib   Rate 71 BPM

## 2025-05-21 NOTE — PLAN OF CARE
Goals remain appropriate.  Problem: Occupational Therapy  Goal: Occupational Therapy Goal  Description: Goals set 5/21 with an expiration date, 6/18:  Patient will increase functional independence with ADLs by performing:  Patient will demonstrate stand pivot transfers with modified independence.   Not met  Patient will demonstrate grooming while standing with modified independence.   Not met  Patient will demonstrate upper body dressing with modified independence while seated EOB.   Not met  Patient will demonstrate lower body dressing with modified independence while seated EOB.   Not met  Patient will demonstrate toileting with modified independence.   Not met  Patient will demonstrate bathing while seated EOB with modified independence.   Not met  Patient's family / caregiver will demonstrate independence and safety with assisting patient with self-care skills and functional mobility.     Not met  Patient and/or patient's family will verbalize understanding of stroke prevention guidelines, personal risk factors and stroke warning signs via teachback method.  Not met           Outcome: Progressing

## 2025-05-21 NOTE — PT/OT/SLP EVAL
"Occupational Therapy   Evaluation    Name: Damari Grant  MRN: [id number]  Admitting Diagnosis:R MCA  Recent Surgery: * No surgery found *      Recommendations:     Discharge Recommendations: No Therapy Indicated  Discharge Equipment Recommendations:  none  Barriers to discharge:  None    Assessment:     Damari Grant is a 89 y.o. female with a medical diagnosis of R MCA.  She presents with performance deficits affecting function: weakness, impaired self care skills, impaired functional mobility, impaired endurance, impaired balance, decreased upper extremity function, decreased coordination, decreased lower extremity function.      Rehab Prognosis: Good; patient would benefit from acute skilled OT services to address these deficits and reach maximum level of function.       Plan:     Patient to be seen 3 x/week to address the above listed problems via self-care/home management, therapeutic activities, therapeutic exercises, neuromuscular re-education, cognitive retraining  Plan of Care Expires: 06/18/25  Plan of Care Reviewed with: patient    Subjective   Patient:  "I feel good."  Dtg:  "I knew something wasn't right Monday night and then she got better but on Tuesday she was slurring."  Occupational Profile:  Patient resides in [place] alone (dtg lives next door) in one story home with 4 steps to enter, rail on right.  Patient is right handed.  PTA patient independent with feeding, grooming, dressing, toileting, supervision with bathing, assistance with meal preparation, not driving.  DME: rollator.  Hobbies: talking, reading.    Pain/Comfort:  Pain Rating 1: 0/10  Pain Rating Post-Intervention 1: 0/10    Patients cultural, spiritual, Orthodox conflicts given the current situation: no    Objective:     Communicated with: Nurse prior to session.  Patient found supine with bed alarm, PureWick, peripheral IV, telemetry upon OT entry to room.    General Precautions: Standard, aspiration, fall  Orthopedic " Precautions: N/A  Braces: N/A  Respiratory Status: Room air    Occupational Performance:    Bed Mobility:    Patient completed Rolling/Turning to Left with  modified independence  Patient completed Rolling/Turning to Right with modified independence  Patient completed Supine to Sit with modified independence  Patient completed Sit to Supine with modified independence    Functional Mobility/Transfers:  Patient completed Sit <> Stand Transfer with stand by assistance  with  no assistive device   Patient completed Bed <> Chair Transfer using Stand Pivot technique with stand by assistance with no assistive device    Activities of Daily Living:  Grooming: stand by assistance    Upper Body Dressing: stand by assistance    Lower Body Dressing: stand by assistance      Cognitive/Visual Perceptual:  Cognitive/Psychosocial Skills:     -       Oriented to: Person, Place, Time, and Situation   -       Follows Commands/attention:Follows one-step commands    Physical Exam:  Postural examination/scapula alignment:    -       Rounded shoulders  Upper Extremity Range of Motion:     -       Right Upper Extremity: WNL  -       Left Upper Extremity: WNL  Upper Extremity Strength:    -       Right Upper Extremity: WNL  -       Left Upper Extremity: WNL    AMPAC 6 Click ADL:  AMPAC Total Score: 18    Treatment & Education:  Patient alert and oriented x 3; able to follow 4/4 one step commands.  Patient attentive and interactive throughout the session.  Patient able to identify 3/3 body parts.  Able to name 3/3 objects.      Patient left supine with all lines intact, call button in reach, and bed alarm on    GOALS:   Multidisciplinary Problems       Occupational Therapy Goals          Problem: Occupational Therapy    Goal Priority Disciplines Outcome Interventions   Occupational Therapy Goal     OT, PT/OT Progressing    Description: Goals set 5/21 with an expiration date, 6/18:  Patient will increase functional independence with ADLs by  performing:  Patient will demonstrate stand pivot transfers with modified independence.   Not met  Patient will demonstrate grooming while standing with modified independence.   Not met  Patient will demonstrate upper body dressing with modified independence while seated EOB.   Not met  Patient will demonstrate lower body dressing with modified independence while seated EOB.   Not met  Patient will demonstrate toileting with modified independence.   Not met  Patient will demonstrate bathing while seated EOB with modified independence.   Not met  Patient's family / caregiver will demonstrate independence and safety with assisting patient with self-care skills and functional mobility.     Not met  Patient and/or patient's family will verbalize understanding of stroke prevention guidelines, personal risk factors and stroke warning signs via teachback method.  Not met                                DME Justifications:  No DME recommended requiring DME justifications    History:     Past Medical History:   Diagnosis Date    Syncope and collapse          Past Surgical History:   Procedure Laterality Date    ADENOIDECTOMY      AORTIC VALVE REPLACEMENT      APPENDECTOMY      CARDIAC VALVE REPLACEMENT      CARDIAC VALVE SURGERY      CORONARY ARTERY BYPASS GRAFT      HYSTERECTOMY      TONSILLECTOMY         Time Tracking:     OT Date of Treatment: 05/21/25  OT Start Time: 0554  OT Stop Time: 0614  OT Total Time (min): 20 min    Billable Minutes:Evaluation 10  Therapeutic Activity 10    5/21/2025

## 2025-05-21 NOTE — ASSESSMENT & PLAN NOTE
Damari Grant is a 89 y.o. female with PMH of Afib (on Eliquis), CAD, prior stroke (no residual deficits) that presents to the ED c/o slurred speech and left facial droop. LKW 5/19/25 at 1800, approx 23 hrs PTA. Pt's daughter at bedside reports patient choked while eating yesterday evening (5/19/25) , followed by slurred speech and left facial droop. She takes Eliquis daily, but missed 5/19 AM dose because she had not yet filled new prescription. She denies unilateral numbness, weakness, vision changes. Neuro exam significant for dysarthria and LFD at rest. NIHSS 3. MRI Brain revealing for small acute infarction right posterior frontal lobe. Determined not a candidate for acute stroke interventions, no TNK due to daily Eliquis use, and no thrombectomy due to symptoms not suggestive of LVO. Patient will be admitted to vascular neurology service/NPU for further evaluation and stroke workup.     Antithrombotics for secondary stroke prevention: Anticoagulants: Apixaban 5 mg BID     Statins for secondary stroke prevention and hyperlipidemia, if present:   Statins: Statins contraindicated due to allergy    Aggressive risk factor modification: A-Fib, CAD     Rehab efforts: no PT requirements  No OT requirements  Speech: soft and bite sized feeds with  speech therapy    Diagnostics ordered/pending: HgbA1C to assess blood glucose levels, Lipid Profile to assess cholesterol levels, MRA head to assess vasculature, MRA neck/arch to assess vasculature, TTE to assess cardiac function/status , TSH to assess thyroid function      BP parameters: Infarct: No intervention, SBP <220    Follow up: [facility] neuro and PCP as outpatient

## 2025-05-21 NOTE — PT/OT/SLP EVAL
Speech Language Pathology Evaluation  Cognitive/Bedside Swallow    Patient Name:  Damari Grant   MRN:  [id number]  Admitting Diagnosis: Embolic stroke involving right middle cerebral artery    Recommendations:                  General Recommendations:  Dysphagia therapy and Speech/language therapy  Diet recommendations:  Soft & Bite Sized Diet - IDDSI Level 6, Thin liquids - IDDSI Level 0   Aspiration Precautions: 1 bite/sip at a time, Avoid talking while eating, Check for pocketing/oral residue, Eliminate distractions, Feed only when awake/alert, Frequent oral care, No straws, Remain upright 30 minutes post meal, Small bites/sips, and Strict aspiration precautions Continue to monitor for signs and symptoms of aspiration and discontinue oral feeding should you notice any of the following: watery eyes, reddened facial area, wet vocal quality, increased work of breathing, change in respiratory status, increased congestion, coughing, fever and/or change in level of alertness  General Precautions: Standard, aspiration, fall  Communication strategies:  provide increased time to answer and go to room if call light pushed    Assessment:     Damari Grant is a 89 y.o. female with an SLP diagnosis of Dysphagia and Dysarthria.  She presents with tolerance of current diet.  Risk of aspiration remains due to decreased endurance and strength and ongoing strict aspiration precautions required. Should change in status occur, recommend defer PO and further assess via MBSS.      History:     Past Medical History:   Diagnosis Date    Syncope and collapse      Past Surgical History:   Procedure Laterality Date    ADENOIDECTOMY      AORTIC VALVE REPLACEMENT      APPENDECTOMY      CARDIAC VALVE REPLACEMENT      CARDIAC VALVE SURGERY      CORONARY ARTERY BYPASS GRAFT      HYSTERECTOMY      TONSILLECTOMY       Social History: Patient lives with at home alone, her Daughter is at the bedside and confirms she lives next door.    Prior  "Intubation HX:  none this admission    Chest X-Rays: none recent     Prior diet: regular,thin    Occupation/hobbies/homemaking: Has some assistance with medications and meal management from her Daughter per Pt/Daughter.    Subjective     SLP reviewed Pt with RN and MD team  Pt presents calm  She explains, "Ready to go!"     Pain/Comfort:  Pain Rating 1: 0/10    Respiratory Status: Room air    Objective:     Cognitive Status:    Arousal/Alertness Appropriate response to stimuli  Attention No obvious deficits observed at the sustained level of attention   Perseveration Not present  Orientation Oriented x4  Memory Immediate Recall : WNL for 2-3 items, Delayed: TBA, and long term recall ongoing assessment  Problem Solving Solutions : Pt provided appropriate solutions 75% of attempts, required cues x1 to clarify  Safety awareness Pt aware of call light precautions   Reasoning: DNA 2/2 Pt/family explained Patient has assistance with all medication/time/financial mngt tasks from family     Receptive Language:   Comprehension:      Questions Simple yes/no WNL  Complex yes/no WNL  Commands  One step WNL    Pragmatics:    Pt with inconsistent eye contact, appropriate response time and topic maintenance     Expressive Language:  Verbal:    Initiation : timely  Naming Single word responsive naming WNL  Conversational speech No word-finding difficulty appreciated t/o conversational speech tasks, Patient denied word-finding difficulty      Motor Speech:  Dysarthria Mildly decreased precision and breath support t/o conversational speech tasks    Voice:   Quality mildly hoarse  Intensity low    Visual-Spatial:  Pt noted to keep her R eye shut t/o most of assessment, RN and MD notified and aware. Pt completed clock drawing tasks WNL    Reading:   WFL     Written Expression:   Dominant hand: Right  Fx writing tasks :name and address WNL  Dictation WNL for sentence to dictation     Oral Musculature Evaluation  Oral Musculature: " general weakness  Dentition: present and adequate  Secretion Management: adequate  Mucosal Quality: dry  Mandibular Strength and Mobility: other (see comments) (mildly prolonged rotary chew)  Oral Labial Strength and Mobility: functional coordination  Lingual Strength and Mobility: impaired strength  Volitional Cough: present  Volitional Swallow: elicited  Voice Prior to PO Intake: mildly hoarse, low intensity    Bedside Swallow Eval:   Consistencies Assessed:  Thin liquids : straw sips x4, cup sips x2  Solids bites of cracker x4     Oral Phase:   Prolonged mastication with adequate oral clearance    Pharyngeal Phase:   no overt clinical signs/symptoms of aspiration    Compensatory Strategies  Single bites/sips, occasional cues to refrain from talking while chewing    Treatment: Pt found upright in bed with her Daughter at the bedside. She denied difficulty with breakfast meal tray. She denied difficulty with language, cognition, or vision.  She endorsed change in speech. Pt and Daughter were educated on SLP role, aspiration precautions, S/S aspiration, diet modifications, clear speech strategies and ongoing SLP POC.  She verbalized partial understanding. No additional qusetions. She remained in bed with her Daughter in the room. RN and MD notified of findings/recommendations.     Goals:   Multidisciplinary Problems       SLP Goals          Problem: SLP    Goal Priority Disciplines Outcome   SLP Goal     SLP Progressing   Description: Speech Language Pathology Goals  Goals expected to be met by 5/28/25    1. Pt will tolerate least restrictive diet without overt S/S aspiration  2. Pt will recall 3+ clear speech strategies 90% of attempts, MOD I  3. Pt will repeat short sentences with 90% intelligibility or higher, MOD I  4. Educate Pt and family on aspiration precautions and SLP POC                       Plan:     Patient to be seen:  4 x/week   Plan of Care expires:  06/20/25  Plan of Care reviewed with:  patient,  daughter   SLP Follow-Up:  Yes       Discharge recommendations:  Therapy Intensity Recommendations at Discharge: Low Intensity Therapy     Time Tracking:     SLP Treatment Date:   05/21/25  Speech Start Time:  1044  Speech Stop Time:  1129     Speech Total Time (min):  45 min    Billable Minutes: Eval 14 , Re-eval 15, and Self Care/Home Management Training 15    05/21/2025

## 2025-05-21 NOTE — PLAN OF CARE
Jourdan Leblanc - Neurosurgery ([facility])      HOME HEALTH ORDERS  FACE TO FACE ENCOUNTER    Patient Name: Damari Grant  YOB: 1931    PCP: Milo Hogan MD   PCP Address: [address] / [place] [address]  PCP Phone Number: [phone]  PCP Fax: [phone]    Encounter Date: 5/20/25    Admit to Home Health    Diagnoses:  Active Hospital Problems    Diagnosis  POA    *Embolic stroke involving right middle cerebral artery [I63.411]  Unknown    Acquired hypothyroidism [E03.9]  Yes    Pure hypercholesterolemia [E78.00]  Yes     Chronic    Chronic atrial fibrillation [I48.20]  Yes     Chronic    Essential hypertension [I10]  Yes     Chronic      Resolved Hospital Problems   No resolved problems to display.       Follow Up Appointments:  Future Appointments   Date Time Provider Department Center   5/27/2025 11:30 AM Alfredo Coffey MD MET IM [place]   7/8/2025 12:40 PM SPECIMEN LAB, [facility] [place]   7/8/2025  1:00 PM CV [facility] E[facility] [place]   7/11/2025 10:00 AM Alexei Langley Jr., MD [facility] [place]       Allergies:  Review of patient's allergies indicates:   Allergen Reactions    Codeine Nausea And Vomiting    Crestor [rosuvastatin] Other (See Comments)     Muscle weakness    Fosamax [alendronate] Other (See Comments)     Didn't tolerate over 10 years ago and doesn't remember what happened.     Livalo [pitavastatin] Other (See Comments)     Muscle pain    Simvastatin Other (See Comments)     Leg pains/ weakness       Medications: Review discharge medications with patient and family and provide education.    Current Medications[1]     Medication List        CONTINUE taking these medications      acetaminophen 500 MG tablet  Commonly known as: TYLENOL  Take 500 mg by mouth as needed.     amLODIPine 2.5 MG tablet  Commonly known as: NORVASC  Take 1 tablet (2.5 mg total) by mouth every evening.     ELIQUIS 5 mg Tab  Generic drug: apixaban  TAKE ONE BY  MOUTH TWICE DAILY     empagliflozin 10 mg tablet  Commonly known as: Jardiance  Take 1 tablet (10 mg total) by mouth once daily.     furosemide 20 MG tablet  Commonly known as: LASIX  Take 1 tablet (20 mg total) by mouth as needed (Swelling).     inhalation spacing device  Use as directed for inhalation.     levalbuterol 1.25 mg/3 mL nebulizer solution  Commonly known as: XOPENEX  Take 3 mLs (1.25 mg total) by nebulization every 4 (four) hours as needed for Wheezing. Rescue     levalbuterol 45 mcg/actuation inhaler  Commonly known as: XOPENEX HFA  Inhale 1-2 puffs into the lungs every 4 (four) hours as needed for Wheezing. Rescue     levothyroxine 25 MCG tablet  Commonly known as: SYNTHROID  TAKE ONE DAILY BEFORE BREAKFAST     metoprolol succinate 50 MG 24 hr tablet  Commonly known as: TOPROL-XL  Take 1 tablet (50 mg total) by mouth every evening.     nystatin-triamcinolone ointment  Commonly known as: MYCOLOG  APPLY TO AFFECTED AREA TWICE DAILY AS NEEDED     REPATHA SURECLICK 140 mg/mL Pnij  Generic drug: evolocumab  INJECT 1 ML (140 MG TOTAL) INTO THE SKIN EVERY 14 (FOURTEEN) DAYS.     spironolactone 25 MG tablet  Commonly known as: ALDACTONE  Take 1 tablet (25 mg total) by mouth once daily.     VITAMIN D2 1,250 mcg (50,000 unit) capsule  Generic drug: ergocalciferol  TAKE 1 CAPSULE (50,000 UNITS TOTAL) BY MOUTH EVERY 7 DAYS.                I have seen and examined this patient within the last 30 days. My clinical findings that support the need for the home health skilled services and home bound status are the following:no   Weakness/numbness causing balance and gait disturbance due to Stroke making it taxing to leave home.     Diet:   Soft and bite sized diet.    Labs:  Report Lab results to PCP.    Referrals/ Consults  Speech Therapy  to evaluate and treat for  Language and Swallowing.    Activities:   activity as tolerated    Nursing:   Agency to admit patient within 24 hours of hospital discharge unless  specified on physician order or at patient request    SN to complete comprehensive assessment including routine vital signs. Instruct on disease process and s/s of complications to report to MD. Review/verify medication list sent home with the patient at time of discharge  and instruct patient/caregiver as needed. Frequency may be adjusted depending on start of care date.     Skilled nurse to perform up to 3 visits PRN for symptoms related to diagnosis    Notify MD if SBP > 160 or < 90; DBP > 90 or < 50; HR > 120 or < 50; Temp > 101; O2 < 88%; Other:       Ok to schedule additional visits based on staff availability and patient request on consecutive days within the home health episode.    When multiple disciplines ordered:    Start of Care occurs on Sunday - Wednesday schedule remaining discipline evaluations as ordered on separate consecutive days following the start of care.    Thursday SOC -schedule subsequent evaluations Friday and Monday the following week.     Friday - Saturday SOC - schedule subsequent discipline evaluations on consecutive days starting Monday of the following week.    For all post-discharge communication and subsequent orders please contact patient's primary care physician. If unable to reach primary care physician or do not receive response within 30 minutes, please contact PCP for clinical staff order clarification    Miscellaneous       Home Health Aide:  Speech Language Pathology Three times weekly    Wound Care Orders  no    I certify that this patient is confined to her home and needs speech therapy.              [1]   Current Facility-Administered Medications   Medication Dose Route Frequency Provider Last Rate Last Admin    acetaminophen tablet 650 mg  650 mg Oral Q6H PRN Barby Graham NP   650 mg at 05/20/25 2056    apixaban tablet 5 mg  5 mg Oral BID Barby Graham NP   5 mg at 05/21/25 0908    bisacodyL suppository 10 mg  10 mg Rectal Daily PRN Barby Graham,  NP        calcium carbonate 200 mg calcium (500 mg) chewable tablet 500 mg  500 mg Oral TID PRN Barby Graham NP        labetalol 20 mg/4 mL (5 mg/mL) IV syring  10 mg Intravenous Q15 Min PRN Barby Graham NP        levothyroxine tablet 25 mcg  25 mcg Oral Before breakfast Barby Graham NP   25 mcg at 05/21/25 0601    ondansetron disintegrating tablet 8 mg  8 mg Oral Q8H PRN Barby Graham NP        polyethylene glycol packet 17 g  17 g Oral Daily Barby Graham NP        sodium chloride 0.9% bolus 500 mL 500 mL  500 mL Intravenous PRN Barby Graham NP        sodium chloride 0.9% flush 10 mL  10 mL Intravenous PRN Barby Graham NP

## 2025-05-21 NOTE — DISCHARGE SUMMARY
Jourdan Leblanc - Neurosurgery ([facility])  Vascular Neurology  Comprehensive Stroke Center  Discharge Summary     Summary:     Admit Date: 5/20/2025  8:29 AM    Discharge Date and Time: 05/21/2025 12:03 PM    Attending Physician: Keaton Bain MD     Discharge Provider: Rosa Isela Stoll MD    History of Present Illness: Damari Grant is a 89 y.o. female with PMH of Afib (on Eliquis), CAD, prior stroke (no residual deficits) that presents to the ED c/o slurred speech and left facial droop. LKW 5/19/25 at 1800, approx 23 hrs PTA. Pt's daughter at bedside reports patient choked while eating yesterday evening (5/19/25) , followed by slurred speech and left facial droop. She takes Eliquis daily, but missed 5/19 AM dose because she had not yet filled new prescription. She denies unilateral numbness, weakness, vision changes. Neuro exam significant for dysarthria and LFD at rest. NIHSS 3. MRI Brain revealing for small acute infarction right posterior frontal lobe. Determined not a candidate for acute stroke interventions, no TNK due to daily Eliquis use, and no thrombectomy due to symptoms not suggestive of LVO. Patient will be admitted to vascular neurology service/NPU for further evaluation and stroke workup.     Hospital Course (synopsis of major diagnoses, care, treatment, and services provided during the course of the hospital stay): 05/21/2025 patient has improved back to baseline with the resumption of her home eliquis as means of anticoagulation. Patient evaluated by OT deemed without therapy needs. Patient is pending speech and official PT evaluation then will be discharged home in the absence of atorvastatin as she is allergic to it. LDL is 43 within target. As per speech: soft and bite sized diet to be continued with speech therapy low intensity as outpatient. Patient favors home health. She is stable for discharge from medical standpoint with [facility] neuro and speech as home health basis.    Goals of Care  Treatment Preferences:  Code Status: Full Code       LaPOST: Yes           Stroke Etiology: Ischemic Cardioembolic Atrial fibrillation    STROKE DOCUMENTATION         NIH Scale:  Interval: 7 days or at discharge (whichever comes first)  1a. Level of Consciousness: 0-->Alert, keenly responsive  1b. LOC Questions: 0-->Answers both questions correctly  1c. LOC Commands: 0-->Performs both tasks correctly  2. Best Gaze: 0-->Normal  3. Visual: 0-->No visual loss  4. Facial Palsy: 0-->Normal symmetrical movements  5a. Motor Arm, Left: 0-->No drift, limb holds 90 (or 45) degrees for full 10 secs  5b. Motor Arm, Right: 0-->No drift, limb holds 90 (or 45) degrees for full 10 secs  6a. Motor Leg, Left: 0-->No drift, leg holds 30 degree position for full 5 secs  6b. Motor Leg, Right: 0-->No drift, leg holds 30 degree position for full 5 secs  7. Limb Ataxia: 0-->Absent  8. Sensory: 0-->Normal, no sensory loss  9. Best Language: 0-->No aphasia, normal  10. Dysarthria: 0-->Normal  11. Extinction and Inattention (formerly Neglect): 0-->No abnormality  Total (NIH Stroke Scale): 0        Modified [place] Score: 0  [place] Coma Scale:    ABCD2 Score:    [id number] Score:7  HAS -BLED Score:   ICH Score:   Hunt & Madison Classification:       Assessment/Plan:     Diagnostic Results:      Brain imaging:  MRI Brain 5/20/25  Impression:     Small acute infarction right posterior frontal lobe.  No significant mass effect or hemorrhagic conversion     Superimposed cerebral volume loss with patchy and confluent T2 FLAIR signal abnormality supratentorial white matter concerning for underlying chronic ischemic change     There is superimposed remote infarction left occipital lobe with encephalomalacia with small left thalamic and punctate left cerebellar remote infarcts.     Vessel Imaging:  PENDING     Cardiac Evaluation:      EKG 5/20/25  A fib   Rate 71 BPM    Echo Saline Bubble? No  Result Date: 5/21/2025    Left Ventricle: The left  ventricle is normal in size. Normal wall   thickness. There is concentric remodeling. Normal wall motion. There is   low normal systolic function with a visually estimated ejection fraction   of 50 - 55%. Diastolic function cannot be reliably determined in the   presence of mitral annular calcification.    Right Ventricle: The right ventricle is normal in size Wall thickness   is normal. Systolic function is normal.    Left Atrium: The left atrium is moderately dilated    Right Atrium: The right atrium is moderately dilated .    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    Mitral Valve: Moderately calcified leaflets. There is moderate mitral   annular calcification. There is mild regurgitation.    Tricuspid Valve: There is mild regurgitation.    Pulmonary Artery: There is pulmonary hypertension. The estimated   pulmonary artery systolic pressure is 58 mmHg.    IVC/SVC: Normal venous pressure at 3 mmHg.           Interventions: None    Complications: None    Disposition:     Final Active Diagnoses:    Diagnosis Date Noted POA    PRINCIPAL PROBLEM:  Embolic stroke involving right middle cerebral artery [I63.411] 05/20/2025 Unknown    Acquired hypothyroidism [E03.9] 08/13/2021 Yes    Pure hypercholesterolemia [E78.00] 04/12/2021 Yes     Chronic    Chronic atrial fibrillation [I48.20] 02/20/2021 Yes     Chronic    Essential hypertension [I10] 06/19/2014 Yes     Chronic      Problems Resolved During this Admission:     Neuro  * Embolic stroke involving right middle cerebral artery  Damari Grant is a 89 y.o. female with PMH of Afib (on Eliquis), CAD, prior stroke (no residual deficits) that presents to the ED c/o slurred speech and left facial droop. LKW 5/19/25 at 1800, approx 23 hrs PTA. Pt's daughter at bedside reports patient choked while eating yesterday evening (5/19/25) , followed by slurred speech and left facial droop. She takes Eliquis daily, but missed 5/19 AM dose  because she had not yet filled new prescription. She denies unilateral numbness, weakness, vision changes. Neuro exam significant for dysarthria and LFD at rest. NIHSS 3. MRI Brain revealing for small acute infarction right posterior frontal lobe. Determined not a candidate for acute stroke interventions, no TNK due to daily Eliquis use, and no thrombectomy due to symptoms not suggestive of LVO. Patient will be admitted to vascular neurology service/NPU for further evaluation and stroke workup.     Antithrombotics for secondary stroke prevention: Anticoagulants: Apixaban 5 mg BID     Statins for secondary stroke prevention and hyperlipidemia, if present:   Statins: Statins contraindicated due to allergy    Aggressive risk factor modification: A-Fib, CAD     Rehab efforts: no PT requirements  No OT requirements  Speech: soft and bite sized feeds with  speech therapy    Diagnostics ordered/pending: HgbA1C to assess blood glucose levels, Lipid Profile to assess cholesterol levels, MRA head to assess vasculature, MRA neck/arch to assess vasculature, TTE to assess cardiac function/status , TSH to assess thyroid function      BP parameters: Infarct: No intervention, SBP <220    Follow up: [facility] neuro and PCP as outpatient          Recommendations:     Post-discharge complication risks: Falls, Urinary tract infections    Stroke Education given to: patient, family, and caregiver    Follow-up in Stroke Clinic in 4-6 weeks.     Discharge Plan:  Anticoagulant: Apixaban 5mg    Follow Up:   Follow-up Information       Milo Hogan MD .    Specialty: Family Medicine  Contact information:  [address]  [place] [address]  [phone]                             Patient Instructions:      Ambulatory referral/consult to Vascular Neurology   Standing Status: Future   Referral Priority: Routine Referral Type: Consultation   Referral Reason: Specialty Services Required   Requested Specialty: Vascular Neurology    Number of Visits Requested: 1     Ambulatory referral/consult to Internal Medicine   Standing Status: Future   Referral Priority: Routine Referral Type: Consultation   Referral Reason: Specialty Services Required   Requested Specialty: Internal Medicine   Number of Visits Requested: 1       Medications:  Reconciled Home Medications:      Medication List        CONTINUE taking these medications      acetaminophen 500 MG tablet  Commonly known as: TYLENOL  Take 500 mg by mouth as needed.     amLODIPine 2.5 MG tablet  Commonly known as: NORVASC  Take 1 tablet (2.5 mg total) by mouth every evening.     ELIQUIS 5 mg Tab  Generic drug: apixaban  TAKE ONE BY MOUTH TWICE DAILY     empagliflozin 10 mg tablet  Commonly known as: Jardiance  Take 1 tablet (10 mg total) by mouth once daily.     furosemide 20 MG tablet  Commonly known as: LASIX  Take 1 tablet (20 mg total) by mouth as needed (Swelling).     inhalation spacing device  Use as directed for inhalation.     levalbuterol 1.25 mg/3 mL nebulizer solution  Commonly known as: XOPENEX  Take 3 mLs (1.25 mg total) by nebulization every 4 (four) hours as needed for Wheezing. Rescue     levalbuterol 45 mcg/actuation inhaler  Commonly known as: XOPENEX HFA  Inhale 1-2 puffs into the lungs every 4 (four) hours as needed for Wheezing. Rescue     levothyroxine 25 MCG tablet  Commonly known as: SYNTHROID  TAKE ONE DAILY BEFORE BREAKFAST     metoprolol succinate 50 MG 24 hr tablet  Commonly known as: TOPROL-XL  Take 1 tablet (50 mg total) by mouth every evening.     nystatin-triamcinolone ointment  Commonly known as: MYCOLOG  APPLY TO AFFECTED AREA TWICE DAILY AS NEEDED     REPATHA SURECLICK 140 mg/mL Pnij  Generic drug: evolocumab  INJECT 1 ML (140 MG TOTAL) INTO THE SKIN EVERY 14 (FOURTEEN) DAYS.     spironolactone 25 MG tablet  Commonly known as: ALDACTONE  Take 1 tablet (25 mg total) by mouth once daily.     VITAMIN D2 1,250 mcg (50,000 unit) capsule  Generic drug:  ergocalciferol  TAKE 1 CAPSULE (50,000 UNITS TOTAL) BY MOUTH EVERY 7 DAYS.              Rosa Isela Stoll MD  [facility]  Department of Vascular Neurology   [facility] - Neurosurgery [facility])

## 2025-05-21 NOTE — PLAN OF CARE
Problem: Stroke, Ischemic (Includes Transient Ischemic Attack)  Goal: Optimal Coping  Outcome: Progressing  Goal: Optimal Cerebral Tissue Perfusion  Outcome: Progressing  Goal: Optimal Cognitive Function  Outcome: Progressing  Goal: Improved Communication Skills  Outcome: Progressing     Problem: Fall Injury Risk  Goal: Absence of Fall and Fall-Related Injury  Outcome: Progressing   stroke booklet [email or web address] w/pt/family. verbalized understanding

## 2025-05-21 NOTE — PT/OT/SLP EVAL
Physical Therapy  Evaluation and Treatment    Patient Name:  Damari Grant   MRN:  [id number]    Recent Surgery: * No surgery found *      Recommendations:     Discharge Recommendations:    Low intensity therapy  Discharge Equipment Recommendations: none   Barriers to discharge: None    Highest Level of Mobility: Gait 90'  Assistance Required: SBA w/ rollator    Assessment:     Damari Grant is a 89 y.o. female admitted with a medical diagnosis of Embolic stroke involving right middle cerebral artery. She presents with the following impairments/functional limitations:  impaired balance, weakness, impaired endurance, decreased safety awareness, impaired self care skills, impaired functional mobility, gait instability, decreased lower extremity function, impaired coordination, impaired cognition    Pt met with HOB elevated, daughter present and agreeable to PT session. Pt reports her PLOF is mod(I) with use of a rollator for mobility. Her family assists her with the 4 steps she has to enter her home. Currently, pt requires SBA for transfers and ambulation with a rollator. Stairs deferred today due to dyspnea on exertion. Pt with full strength and B LEs and has no overt LOB while ambulating. PT to f/u while pt remains hospitalized to reduce the risk of deconditioning.    Pt would benefit from continued skilled acute PT 2x/wk to address above listed functional deficits, provide patient/caregiver education, reduce fall risk, and maximize (I) and safety with functional mobility.    Rehab Prognosis: Good; patient would benefit from acute skilled PT services to address these deficits and reach maximum level of function.      Plan:     During this hospitalization, patient to be seen 2 x/week to address the identified rehab impairments via gait training, therapeutic activities, therapeutic exercises, neuromuscular re-education and progress toward the following goals:    Plan of Care Expires:  06/21/25    This plan of care  "has been discussed with the patient/caregiver, who was included in its development and is in agreement with the identified goals and treatment plan.     Subjective     Communicated with RN prior to session.  Patient agreeable to participate.     Chief Complaint: R MCA CVA   Patient/Family Comments/goals: "I don't enjoy walking very much"    Pain/Comfort:  Pain Rating 1: 0/10  Pain Rating Post-Intervention 1: 0/10    Patients cultural, spiritual, Faith conflicts given the current situation: no    Patient's living environment is as follows:  Living Environment: Pt lives alone in [facility] with 4 NICK, R HR.   Prior Level of Function: modified (I) for mobility and ADLs using rollator as AD   DME used: rollator  DME owned (not currently used): none  Upon discharge, patient will have assistance from: Family that lives next door    Objective:     Patient found HOB elevated with bed alarm, telemetry, PureWick  upon PT entry to room.    General Precautions: Standard, fall   Orthopedic Precautions:N/A   Braces: N/A   BP (!) 155/105 (BP Location: Right arm, Patient Position: Lying)   Pulse 80   Temp 97.7 °F (36.5 °C) (Oral)   Resp 17   Ht 5' 5" (1.651 m)   Wt 74.4 kg (164 lb)   SpO2 (!) 92%   Breastfeeding No   BMI 27.29 kg/m²   Oxygen Device: room air      Exams:    Cognition:  Patient is oriented to Person, Place, Time, Situation  Follows multistep  commands  Insight to deficits/safety awareness: intact    Postural examination/scapula alignment: Rounded shoulder    Lower Extremity Range of Motion:  Right Lower Extremity: WNL  Left Lower Extremity: WNL    Lower Extremity Strength    Right LE  Left LE    Hip Flexion: 4/5 Hip Flexion: 4/5   Knee Extension: 4+/5 Knee Extension: 4+/5   Knee Flexion: 4+/5 Knee Flexion: 4+/5   Ankle Dorsiflexion:  4+/5 Ankle Dorsiflexion: 4+/5   Ankle Plantarflexion: 4+/5 Ankle Plantarflexion: 4+/5        Sensation:   Light touch sensation: Intact BLEs    Functional Mobility:    Bed " Mobility:  Supine to Sit: Stand-by Assistance on R side of bed  Scooting anteriorly to EOB to plant feet on floor: Stand-by Assistance    Transfers:   Sit to Stand Transfer: Stand-by Assistance  from EOB with rollator  Bed to Chair: Stand-by Assistance with rollator              Gait:  Patient received gait training 90 feet with Stand-by Assistance and rollator  Gait Assessment: flexed posture and decreased derrell  Gait Pattern Observed: Step-through reciprocal gait   Comments: All lines remained intact throughout ambulation trial, gait belt utilized. Pt with dyspnea on exertion- required 1 standing rest break. No overt LOB when ambulating, pt states she is ambulating at her baseline level    Balance:  Static Sit:   Stand-By Assist at EOB   Static Stand:   Stand-By Assist with Rollator  Dynamic Stand:  Stand-By Assist with Rollator        Therapeutic Activities/Exercises     Patient assisted with functional mobility as noted above  Discussed at length benefits of PT as well as d/c recommendations. Pt agreeable  Patient educated on the importance of early mobility, OOB to prevent functional decline during hospital stay  Patient was instructed to utilize staff assistance for mobility/transfers.  Patient is appropriate to transfer with SBA and RN/PCT assist  Patient educated on PT POC and role of PT in acute care  White board updated to include patient's safest level of mobility with staff assistance, RN also updated    AM-PAC 6 CLICK MOBILITY  Turning over in bed (including adjusting bedclothes, sheets and blankets)?: 4  Sitting down on and standing up from a chair with arms (e.g., wheelchair, bedside commode, etc.): 4  Moving from lying on back to sitting on the side of the bed?: 4  Moving to and from a bed to a chair (including a wheelchair)?: 3  Need to walk in hospital room?: 3  Climbing 3-5 steps with a railing?: 3  Basic Mobility Total Score: 21      DME Justifications:  No DME recommended requiring DME  justifications    Patient left up in chair with all lines intact, call button in reach, chair alarm on, and rn notified.      History/Goals:     PAST MEDICAL HISTORY:  Past Medical History:   Diagnosis Date    Syncope and collapse        Past Surgical History:   Procedure Laterality Date    ADENOIDECTOMY      AORTIC VALVE REPLACEMENT      APPENDECTOMY      CARDIAC VALVE REPLACEMENT      CARDIAC VALVE SURGERY      CORONARY ARTERY BYPASS GRAFT      HYSTERECTOMY      TONSILLECTOMY         GOALS:   Multidisciplinary Problems       Physical Therapy Goals          Problem: Physical Therapy    Goal Priority Disciplines Outcome Interventions   Physical Therapy Goal     PT, PT/OT Progressing    Description: Goals to be met by: 25     Patient will increase functional independence with mobility by performin. Sit to stand transfer with Modified [place]  2. Bed to chair transfer with Modified [place] using rollator  3. Gait  x 150 feet with Modified [place] using rollator.   4. Ascend/descend 4 stair with right Handrails with Modified [place]  5. Lower extremity exercise program x10 reps per handout, with assistance as needed                         Time Tracking:     PT Received On: 25  PT Start Time: 0823     PT Stop Time: 0846  PT Total Time (min): 23 min     Billable Minutes: Evaluation 10 and Therapeutic Activity 13      Micheline Mg, PT  2025  Pager# [phone]

## 2025-05-21 NOTE — PLAN OF CARE
[facility] (Hospital)  Initial Discharge Assessment       Primary Care Provider: Milo Hogan MD    Admission Diagnosis: Slurred speech [R47.81]  Bradycardia [R00.1]  Stroke [I63.9]  Cerebrovascular accident (CVA), unspecified mechanism [I63.9]    Admission Date: 5/20/2025  Expected Discharge Date: 5/21/2025    Transition of Care Barriers: None    Payor: MEDICARE / Plan: MEDICARE PART A & B / Product Type: Government /     Extended Emergency Contact Information  Primary Emergency Contact: Sujata Conte  Address: [address]           [place] [address] [place]  Mobile Phone: [phone]  Relation: Daughter    Discharge Plan A: Home  Discharge Plan B: Home with family      [facility] ([place] - [place] - [address]  [address] [place] [address]  Phone: [phone] Fax: [phone]      Initial Assessment (most recent)       Adult Discharge Assessment - 05/21/25 1307          Discharge Assessment    Assessment Type Discharge Planning Assessment     Confirmed/corrected address, phone number and insurance Yes     Source of Information patient;family     Communicated KRISTA with patient/caregiver Yes     People in Home alone     Do you expect to return to your current living situation? Yes     Do you have help at home or someone to help you manage your care at home? Yes     Who are your caregiver(s) and their phone number(s)? Sujata Conte (daughter) [phone]     Prior to hospitilization cognitive status: Alert/Oriented     Current cognitive status: Alert/Oriented     Walking or Climbing Stairs Difficulty yes     Walking or Climbing Stairs ambulation difficulty, requires equipment     Dressing/Bathing Difficulty no     Equipment Currently Used at Home rollator     Readmission within 30 days? No     Patient currently being followed by outpatient case management? No     Do you currently have service(s) that help you manage your care at home? No     Do you  take prescription medications? Yes     Do you have prescription coverage? Yes     Do you have any problems affording any of your prescribed medications? No     Is the patient taking medications as prescribed? yes     Who is going to help you get home at discharge? Patient's daughter will provide transportation home.     How do you get to doctors appointments? family or friend will provide     Are you on dialysis? No     Do you take coumadin? No     Discharge Plan A Home     Discharge Plan B Home with family     DME Needed Upon Discharge  none     Discharge Plan discussed with: Patient;Adult children     Transition of Care Barriers None                   Discharge Plan A and Plan B have been determined by review of patient's clinical status, future medical and therapeutic needs, and coverage/benefits for post-acute care in coordination with multidisciplinary team members.       Unique Pérez RN  Ext [phone]

## 2025-05-22 ENCOUNTER — NURSE TRIAGE (OUTPATIENT)
Dept: ADMINISTRATIVE | Facility: CLINIC | Age: OVER 89
End: 2025-05-22
Payer: MEDICARE

## 2025-05-22 ENCOUNTER — PATIENT MESSAGE (OUTPATIENT)
Dept: INTERNAL MEDICINE | Facility: CLINIC | Age: OVER 89
End: 2025-05-22
Payer: MEDICARE

## 2025-05-22 NOTE — TELEPHONE ENCOUNTER
Pt was discharged on yesterday from hospital post mini stroke on Monday. Caregiver, Sujata not with patient at present. Pt home but caregiver calling to find out if speech therapy was ordered and needs to be set up. Caregiver says no one has called her yet.    Reason for Disposition   [1] Follow-up call from patient regarding patient's clinical status AND [2] information NON-URGENT    Protocols used: PCP Call - No Triage-A-

## 2025-05-23 ENCOUNTER — PATIENT MESSAGE (OUTPATIENT)
Dept: INTERNAL MEDICINE | Facility: CLINIC | Age: OVER 89
End: 2025-05-23
Payer: MEDICARE

## 2025-05-23 ENCOUNTER — TELEPHONE (OUTPATIENT)
Dept: INTERNAL MEDICINE | Facility: CLINIC | Age: OVER 89
End: 2025-05-23
Payer: MEDICARE

## 2025-05-23 ENCOUNTER — PATIENT OUTREACH (OUTPATIENT)
Dept: ADMINISTRATIVE | Facility: CLINIC | Age: OVER 89
End: 2025-05-23
Payer: MEDICARE

## 2025-05-23 NOTE — TELEPHONE ENCOUNTER
----- Message from  sent at 5/23/2025  1:29 PM CDT -----  Regarding: Medication  Patient's daughter reports the patient is taking the following medications that are not a part of the discharge summary or MAR. PLEASE RESPOND DIRECTLY TO THE PT IN REGARD TO THIS MESSAGE.Tylenol PM as neededRespectfully,Katheryn COURTNEY, lpnTransition of Care

## 2025-05-23 NOTE — PROGRESS NOTES
C3 nurse spoke with Damari Grant, patient's daughter, Sujata, for a TCC post hospital discharge follow up call. The patient has a scheduled HOSFU appointment with Milo Hogan MD on 5/28/2025 @ 9:30 AM.

## 2025-05-26 ENCOUNTER — PATIENT MESSAGE (OUTPATIENT)
Dept: INTERNAL MEDICINE | Facility: CLINIC | Age: OVER 89
End: 2025-05-26
Payer: MEDICARE

## 2025-05-27 NOTE — PROGRESS NOTES
Subjective:    The following note was written in combination with deep-scribe software and dictation (to which understanding and consent was verbally expressed); please excuse any transcription and/or dictation errors.      Chief Complaint: Hospital Follow Up    Transitional Care Note    Family and/or Caretaker present at visit?  Yes (daughter)  Diagnostic tests reviewed/disposition: No diagnosic tests pending after this hospitalization.  Disease/illness education:  TIA (history) versus more recent CVA  Home health/community services discussion/referrals: Patient does not have home health established from hospital visit.  They do need home health.  If needed, we will set up home health for the patient.   Establishment or re-establishment of referral orders for community resources: No other necessary community resources.   Discussion with other health care providers: No discussion with other health care providers necessary.      Medication reconciliation completed    HPI  Ms. 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 (Estrace cream), severe carotid artery stenosis (L-70/80%; R- 30/40%) s/p stenting of L-internal carotid-A, aortic stenosis, hypothyroidism presenting for hospital follow-up:     Hospital follow-up:   -presented with slurred speech/last facial droop (last seen normal 1 day prior to arrival) with concern for preceding aspiration (had missed preceding Eliquis dose)  -MRI Brain revealing for small acute infarction right posterior frontal lobe    -admitted to vascular Neurology for monitoring (deferred acute intervention)  -improved back to prior baseline and was discharged on home Eliquis and plan for outpatient vascular Neurology (6/5 via telemedicine) follow-up and speech therapy via home health (they have not yet been contacted)  -per the patient and her  daughter/caretaker, her neurologic deficits are nearly entirely resolved (but does notably have modest dysarthria and right-sided ptosis)    Review of Systems   Constitutional:  Negative for appetite change, chills and fever.   HENT: Negative.     Respiratory:  Negative for cough, chest tightness and shortness of breath.    Cardiovascular:  Negative for chest pain, palpitations and leg swelling.   Gastrointestinal:  Negative for abdominal distention, abdominal pain, blood in stool, constipation, diarrhea, nausea and vomiting.   Endocrine: Negative.    Genitourinary:  Negative for difficulty urinating, dysuria, frequency and hematuria.   Musculoskeletal: Negative.    Integumentary:  Negative.   Neurological: Negative.    Psychiatric/Behavioral: Negative.           Objective:      Vitals:    05/29/25 0750   BP: 120/72   Pulse: 66   Resp: 15   Temp: 97.5 °F (36.4 °C)      Physical Exam  Vitals reviewed.   Constitutional:       General: She is not in acute distress.     Appearance: Normal appearance.   HENT:      Head: Normocephalic and atraumatic.      Comments: Facial features are symmetric      Nose: Nose normal. No congestion or rhinorrhea.      Mouth/Throat:      Mouth: Mucous membranes are moist.      Pharynx: Oropharynx is clear. No oropharyngeal exudate or posterior oropharyngeal erythema.   Eyes:      General: No scleral icterus.     Extraocular Movements: Extraocular movements intact.      Conjunctiva/sclera: Conjunctivae normal.   Cardiovascular:      Rate and Rhythm: Normal rate and regular rhythm.      Pulses: Normal pulses.      Heart sounds: Normal heart sounds.   Pulmonary:      Effort: Pulmonary effort is normal. No respiratory distress.      Breath sounds: Normal breath sounds.   Musculoskeletal:         General: No deformity or signs of injury. Normal range of motion.      Cervical back: Normal range of motion.   Skin:     General: Skin is warm and dry.      Findings: No rash.   Neurological:       Mental Status: She is alert and oriented to person, place, and time.      Comments: Slight right-sided ptosis and modest dysarthria (not significantly impeding ability to communicate)   Psychiatric:         Mood and Affect: Mood normal.         Behavior: Behavior normal.         Thought Content: Thought content normal.       Medications Ordered Prior to Encounter[1]      Assessment:       1. Hospital discharge follow-up    2. Cerebrovascular accident (CVA), unspecified mechanism    3. Dysarthria        Plan:       Hospital discharge follow-up  Medications reconciled  Cerebrovascular accident (CVA), unspecified mechanism  -     Ambulatory referral/consult to Home Health; Future; Expected date: 05/30/2025   - patient and caretaker are aware of upcoming vascular Neurology telemedicine follow-up   - as she has not yet been contacted by home health/speech, will replace the order and ask patient to inform us if they have not contacted within the next week.    Dysarthria  -     Ambulatory referral/consult to Home Health; Future; Expected date: 05/30/2025                  [1]   Current Outpatient Medications on File Prior to Visit   Medication Sig Dispense Refill    acetaminophen (TYLENOL) 500 MG tablet Take 500 mg by mouth as needed.      amLODIPine (NORVASC) 2.5 MG tablet Take 1 tablet (2.5 mg total) by mouth every evening. 30 tablet 11    apixaban (ELIQUIS) 5 mg Tab TAKE ONE BY MOUTH TWICE DAILY 60 tablet 11    empagliflozin (JARDIANCE) 10 mg tablet Take 1 tablet (10 mg total) by mouth once daily. 30 tablet 11    ergocalciferol (VITAMIN D2) 50,000 unit Cap TAKE 1 CAPSULE (50,000 UNITS TOTAL) BY MOUTH EVERY 7 DAYS. 12 capsule 0    inhalation spacing device Use as directed for inhalation. 1 each 0    levalbuterol (XOPENEX HFA) 45 mcg/actuation inhaler Inhale 1-2 puffs into the lungs every 4 (four) hours as needed for Wheezing. Rescue 15 g 11    levothyroxine (SYNTHROID) 25 MCG tablet TAKE ONE DAILY BEFORE BREAKFAST 90  tablet 3    metoprolol succinate (TOPROL-XL) 50 MG 24 hr tablet Take 1 tablet (50 mg total) by mouth every evening. 90 tablet 3    nystatin-triamcinolone (MYCOLOG) ointment APPLY TO AFFECTED AREA TWICE DAILY AS NEEDED 60 g 1    REPATHA SURECLICK 140 mg/mL PnIj INJECT 1 ML (140 MG TOTAL) INTO THE SKIN EVERY 14 (FOURTEEN) DAYS. 6 mL 3    furosemide (LASIX) 20 MG tablet Take 1 tablet (20 mg total) by mouth as needed (Swelling). 30 tablet 11    levalbuterol (XOPENEX) 1.25 mg/3 mL nebulizer solution Take 3 mLs (1.25 mg total) by nebulization every 4 (four) hours as needed for Wheezing. Rescue 90 each 11    spironolactone (ALDACTONE) 25 MG tablet Take 1 tablet (25 mg total) by mouth once daily. 90 tablet 3     No current facility-administered medications on file prior to visit.

## 2025-05-29 ENCOUNTER — PATIENT MESSAGE (OUTPATIENT)
Dept: CARDIOLOGY | Facility: CLINIC | Age: OVER 89
End: 2025-05-29
Payer: MEDICARE

## 2025-05-29 ENCOUNTER — OFFICE VISIT (OUTPATIENT)
Dept: INTERNAL MEDICINE | Facility: CLINIC | Age: OVER 89
End: 2025-05-29
Payer: MEDICARE

## 2025-05-29 VITALS
BODY MASS INDEX: 26 KG/M2 | TEMPERATURE: 98 F | RESPIRATION RATE: 15 BRPM | HEART RATE: 66 BPM | WEIGHT: 156.06 LBS | HEIGHT: 65 IN | SYSTOLIC BLOOD PRESSURE: 120 MMHG | OXYGEN SATURATION: 94 % | DIASTOLIC BLOOD PRESSURE: 72 MMHG

## 2025-05-29 DIAGNOSIS — I63.9 CEREBROVASCULAR ACCIDENT (CVA), UNSPECIFIED MECHANISM: ICD-10-CM

## 2025-05-29 DIAGNOSIS — Z09 HOSPITAL DISCHARGE FOLLOW-UP: Primary | ICD-10-CM

## 2025-05-29 DIAGNOSIS — R47.1 DYSARTHRIA: ICD-10-CM

## 2025-05-29 PROCEDURE — 99214 OFFICE O/P EST MOD 30 MIN: CPT | Mod: PBBFAC,PO | Performed by: STUDENT IN AN ORGANIZED HEALTH CARE EDUCATION/TRAINING PROGRAM

## 2025-05-29 PROCEDURE — 99999 PR PBB SHADOW E&M-EST. PATIENT-LVL IV: CPT | Mod: PBBFAC,,, | Performed by: STUDENT IN AN ORGANIZED HEALTH CARE EDUCATION/TRAINING PROGRAM

## 2025-05-31 PROCEDURE — G0180 MD CERTIFICATION HHA PATIENT: HCPCS | Mod: ,,, | Performed by: STUDENT IN AN ORGANIZED HEALTH CARE EDUCATION/TRAINING PROGRAM

## 2025-06-04 ENCOUNTER — PATIENT MESSAGE (OUTPATIENT)
Dept: INTERNAL MEDICINE | Facility: CLINIC | Age: OVER 89
End: 2025-06-04
Payer: MEDICARE

## 2025-06-05 ENCOUNTER — OFFICE VISIT (OUTPATIENT)
Dept: NEUROLOGY | Facility: CLINIC | Age: OVER 89
End: 2025-06-05
Payer: MEDICARE

## 2025-06-05 DIAGNOSIS — I35.0 NONRHEUMATIC AORTIC VALVE STENOSIS: Chronic | ICD-10-CM

## 2025-06-05 DIAGNOSIS — I25.10 ARTERIOSCLEROSIS OF CORONARY ARTERY: Chronic | ICD-10-CM

## 2025-06-05 DIAGNOSIS — I77.9 CAROTID ARTERY DISEASE, UNSPECIFIED LATERALITY, UNSPECIFIED TYPE: Chronic | ICD-10-CM

## 2025-06-05 DIAGNOSIS — Z95.3 S/P TAVR (TRANSCATHETER AORTIC VALVE REPLACEMENT): Chronic | ICD-10-CM

## 2025-06-05 DIAGNOSIS — R47.81 SLURRED SPEECH: ICD-10-CM

## 2025-06-05 DIAGNOSIS — I48.20 CHRONIC ATRIAL FIBRILLATION: Chronic | ICD-10-CM

## 2025-06-05 DIAGNOSIS — E78.00 PURE HYPERCHOLESTEROLEMIA: Chronic | ICD-10-CM

## 2025-06-05 DIAGNOSIS — I70.0 ATHEROSCLEROSIS OF AORTA: Chronic | ICD-10-CM

## 2025-06-05 DIAGNOSIS — Z95.1 HISTORY OF CORONARY ARTERY BYPASS GRAFT: Chronic | ICD-10-CM

## 2025-06-05 DIAGNOSIS — I63.411 EMBOLIC STROKE INVOLVING RIGHT MIDDLE CEREBRAL ARTERY: Primary | ICD-10-CM

## 2025-06-05 DIAGNOSIS — I10 ESSENTIAL HYPERTENSION: Chronic | ICD-10-CM

## 2025-06-10 ENCOUNTER — PATIENT MESSAGE (OUTPATIENT)
Dept: CARDIOLOGY | Facility: CLINIC | Age: OVER 89
End: 2025-06-10
Payer: MEDICARE

## 2025-06-16 ENCOUNTER — PATIENT MESSAGE (OUTPATIENT)
Dept: INTERNAL MEDICINE | Facility: CLINIC | Age: OVER 89
End: 2025-06-16
Payer: MEDICARE

## 2025-06-16 ENCOUNTER — TELEPHONE (OUTPATIENT)
Dept: INTERNAL MEDICINE | Facility: CLINIC | Age: OVER 89
End: 2025-06-16
Payer: MEDICARE

## 2025-06-16 NOTE — TELEPHONE ENCOUNTER
Copied from CRM #[id number]. Topic: General Inquiry - Patient Advice  >> Jun 16, 2025  3:48 PM Leidy wrote:  .1MEDICALADVICE     Patient is calling for Medical Advice regarding:[facility] is calling because soul like to discharge pt due to meeting goals set for speech therapy and over all cognition. .    How long has patient had these symptoms:    Pharmacy name and phone#:    Patient wants a call back or thru [facility], provide patient's call back phone number:    Comments:    Please advise patient replies from provider may take up to 48 hours.

## 2025-06-23 DIAGNOSIS — I50.32 CHRONIC DIASTOLIC HEART FAILURE: Chronic | ICD-10-CM

## 2025-06-23 RX ORDER — METOPROLOL SUCCINATE 50 MG/1
50 TABLET, EXTENDED RELEASE ORAL NIGHTLY
Qty: 90 TABLET | Refills: 3 | Status: SHIPPED | OUTPATIENT
Start: 2025-06-23

## 2025-06-27 ENCOUNTER — PATIENT MESSAGE (OUTPATIENT)
Dept: CARDIOLOGY | Facility: CLINIC | Age: OVER 89
End: 2025-06-27
Payer: MEDICARE

## 2025-06-27 DIAGNOSIS — E78.00 PURE HYPERCHOLESTEROLEMIA: ICD-10-CM

## 2025-06-27 DIAGNOSIS — E78.49 OTHER HYPERLIPIDEMIA: ICD-10-CM

## 2025-06-27 DIAGNOSIS — Z95.1 HISTORY OF CORONARY ARTERY BYPASS GRAFT: ICD-10-CM

## 2025-06-27 DIAGNOSIS — Z78.9 STATIN INTOLERANCE: ICD-10-CM

## 2025-06-27 RX ORDER — EVOLOCUMAB 140 MG/ML
140 INJECTION, SOLUTION SUBCUTANEOUS
Qty: 6 ML | Refills: 3 | Status: SHIPPED | OUTPATIENT
Start: 2025-06-27 | End: 2025-06-27 | Stop reason: SDUPTHER

## 2025-06-27 RX ORDER — EVOLOCUMAB 140 MG/ML
140 INJECTION, SOLUTION SUBCUTANEOUS
Qty: 6 ML | Refills: 3 | Status: SHIPPED | OUTPATIENT
Start: 2025-06-27

## 2025-07-03 ENCOUNTER — PATIENT MESSAGE (OUTPATIENT)
Dept: CARDIOLOGY | Facility: CLINIC | Age: OVER 89
End: 2025-07-03
Payer: MEDICARE

## 2025-07-15 NOTE — TELEPHONE ENCOUNTER
Please see the attached refill request.   [FreeTextEntry6] : pt has throat pain and fever since yesterday recently travel for sports no one sick at home

## 2025-07-23 ENCOUNTER — OFFICE VISIT (OUTPATIENT)
Dept: CARDIOLOGY | Facility: CLINIC | Age: OVER 89
End: 2025-07-23
Payer: MEDICARE

## 2025-07-23 VITALS
HEIGHT: 65 IN | HEART RATE: 66 BPM | SYSTOLIC BLOOD PRESSURE: 123 MMHG | WEIGHT: 164.69 LBS | BODY MASS INDEX: 27.44 KG/M2 | DIASTOLIC BLOOD PRESSURE: 79 MMHG

## 2025-07-23 DIAGNOSIS — I48.20 CHRONIC ATRIAL FIBRILLATION: Chronic | ICD-10-CM

## 2025-07-23 DIAGNOSIS — I27.20 PULMONARY HYPERTENSION: Chronic | ICD-10-CM

## 2025-07-23 DIAGNOSIS — Z95.1 HISTORY OF CORONARY ARTERY BYPASS GRAFT: Chronic | ICD-10-CM

## 2025-07-23 DIAGNOSIS — I25.10 ARTERIOSCLEROSIS OF CORONARY ARTERY: Chronic | ICD-10-CM

## 2025-07-23 DIAGNOSIS — I50.32 CHRONIC DIASTOLIC HEART FAILURE: Primary | Chronic | ICD-10-CM

## 2025-07-23 DIAGNOSIS — I63.411 EMBOLIC STROKE INVOLVING RIGHT MIDDLE CEREBRAL ARTERY: ICD-10-CM

## 2025-07-23 DIAGNOSIS — I65.23 BILATERAL CAROTID ARTERY STENOSIS: Chronic | ICD-10-CM

## 2025-07-23 DIAGNOSIS — Z95.3 S/P TAVR (TRANSCATHETER AORTIC VALVE REPLACEMENT): Chronic | ICD-10-CM

## 2025-07-23 DIAGNOSIS — I10 ESSENTIAL HYPERTENSION: Chronic | ICD-10-CM

## 2025-07-23 PROCEDURE — 99213 OFFICE O/P EST LOW 20 MIN: CPT | Mod: PBBFAC | Performed by: INTERNAL MEDICINE

## 2025-07-23 PROCEDURE — 99214 OFFICE O/P EST MOD 30 MIN: CPT | Mod: S$PBB,,, | Performed by: INTERNAL MEDICINE

## 2025-07-23 PROCEDURE — 99999 PR PBB SHADOW E&M-EST. PATIENT-LVL III: CPT | Mod: PBBFAC,,, | Performed by: INTERNAL MEDICINE

## 2025-07-23 PROCEDURE — G2211 COMPLEX E/M VISIT ADD ON: HCPCS | Mod: ,,, | Performed by: INTERNAL MEDICINE

## 2025-07-23 NOTE — PROGRESS NOTES
Subjective:   07/23/2025     Patient ID:  Damari Grant is a 89 y.o. female who presents for evaulation of Follow-up       History of Present Illness    CHIEF COMPLAINT:  Patient presents for follow-up to discuss recent health events, including a TIA, and to address ongoing cardiovascular concerns.    HPI:  Patient, a 89-year-old female, experienced a TIA. On May 19th, she developed a mild cough. Her caregiver later found her in bed with abnormal speech after consuming cookies. The following morning, she was taken to the ED, where a CT and US were performed. She was admitted overnight and subsequently discharged.    She reports dyspnea with normal walking and has 2+ edema in her left leg, while the right leg shows no edema. She has been using a diuretic at home for the swelling.    She exhibited slowed speech. Therapy was started but discontinued after 1 or 2 sessions due to improvement in her condition.    The caregiver reports audible groaning during her breathing, which was confirmed during exam. She missed 1 dose of Eliquis on the morning of the TIA but resumed the medication that evening.    She denies wheezing and additional dyspnea. She does not deny any specific medical diagnoses.    CARDIAC HISTORY:  Echo 05/25/2025: EF 50-55%, pulmonary HTN (PASP 58 mmHg)  AS s/p TAVR  CT, US performed CAD s/p CABG AF    MEDICATIONS:  Eliquis 5 mg twice daily for AF  PCSK9 inhibitor for HLD  Amlodipine 2.5 mg nightly for HTN  Lasix 20 mg PRN  Spironolactone 25 mg daily  Metoprolol 50 mg nightly  Discontinued SGLT2 inhibitor therapy due to intolerance    TEST RESULTS:  Patient's recent lab results from May 25 show a magnesium level of 1.9, TSH of 2.9, and a hemoglobin A1C of 5.5. Her lipid panel on the same date revealed a total cholesterol of 110, triglycerides of 96, HDL of 47, and LDL of 44.    IMAGING:  Patient underwent both a CT and an ultrasound on May 19, which were performed in the Emergency  Department.    MEDICAL HISTORY:  Patient has a history of hyperlipidemia (HLD). She experienced a TIA on May 19.      ROS:  General: -fever, -chills, -fatigue, -weight gain, -weight loss  Eyes: -vision changes, -redness, -discharge  ENT: -ear pain, -nasal congestion, -sore throat  Cardiovascular: -chest pain, -palpitations, +lower extremity edema  Respiratory: -cough, -shortness of breath, +exertional dyspnea  Gastrointestinal: -abdominal pain, -nausea, -vomiting, -diarrhea, -constipation, -blood in stool  Genitourinary: -dysuria, -hematuria, -frequency  Musculoskeletal: -joint pain, -muscle pain  Skin: -rash, -lesion  Neurological: -headache, -dizziness, -numbness, -tingling  Psychiatric: -anxiety, -depression, -sleep difficulty          Problem List[1]     Review of patient's allergies indicates:   Allergen Reactions    Codeine Nausea And Vomiting    Crestor [rosuvastatin] Other (See Comments)     Muscle weakness    Fosamax [alendronate] Other (See Comments)     Didn't tolerate over 10 years ago and doesn't remember what happened.     Livalo [pitavastatin] Other (See Comments)     Muscle pain    Simvastatin Other (See Comments)     Leg pains/ weakness       Current Medications[2]     Objective:   Physical Exam    General: No acute distress. Well-developed. Well-nourished.  Eyes: EOMI. Sclerae anicteric.  HENT: Normocephalic. Atraumatic. Nares patent. Moist oral mucosa.  Cardiovascular: Regular rate. Regular rhythm. Grade 1 systolic murmur. No rubs. No gallops. Normal S1, S2.  Respiratory: Normal respiratory effort. Clear to auscultation bilaterally. No rales. No rhonchi. No wheezing.  Musculoskeletal: No  obvious deformity.  Extremities: No lower extremity edema.  Neurological: Alert & oriented x3. No slurred speech. Normal gait.  Psychiatric: Normal mood. Normal affect. Good insight. Good judgment.  Skin: Warm. Dry. No rash.  MSK: Foot/Ankle - Left: Foot edema (Left). Ankle edema (Left).  MSK: Foot/Ankle - Right:  No edema in right leg.          Vitals:    07/23/25 0924   BP: 123/79   Pulse: 66     Wt Readings from Last 3 Encounters:   07/23/25 74.7 kg (164 lb 10.9 oz)   05/29/25 70.8 kg (156 lb 1.4 oz)   05/21/25 74.4 kg (164 lb)     Temp Readings from Last 3 Encounters:   05/29/25 97.5 °F (36.4 °C) (Temporal)   05/21/25 98.8 °F (37.1 °C) (Oral)   05/05/25 98.2 °F (36.8 °C)     BP Readings from Last 3 Encounters:   07/23/25 123/79   05/29/25 120/72   05/21/25 (!) 152/85     Pulse Readings from Last 3 Encounters:   07/23/25 66   05/29/25 66   05/21/25 82               Lab Results   Component Value Date    CHOL 110 (L) 05/20/2025    CHOL 88 (L) 12/10/2024    CHOL 90 (L) 01/03/2024     Lab Results   Component Value Date    HDL 47 05/20/2025    HDL 45 12/10/2024    HDL 41 01/03/2024     Lab Results   Component Value Date    LDLCALC 43.8 (L) 05/20/2025    LDLCALC 27.4 (L) 12/10/2024    LDLCALC 29.8 (L) 01/03/2024     Lab Results   Component Value Date    ALT 11 05/21/2025    AST 15 05/21/2025    AST 17 05/20/2025    AST 17 12/10/2024     Lab Results   Component Value Date    CREATININE 0.7 05/21/2025    BUN 10 05/21/2025     05/21/2025    K 4.2 05/21/2025    CO2 22 (L) 05/21/2025    CO2 23 05/20/2025    CO2 24 12/10/2024     Lab Results   Component Value Date    HGB 14.0 05/21/2025    HCT 43.7 05/21/2025    HCT 46.0 05/20/2025    HCT 48.8 (H) 12/10/2024       Assessment and Plan:   Assessment & Plan    I50.32 Chronic diastolic heart failure  I27.20 Pulmonary hypertension  I48.20 Chronic atrial fibrillation  I10 Essential hypertension  Z95.3 S/P TAVR (transcatheter aortic valve replacement)  Z95.1 History of coronary artery bypass graft  I25.10 Arteriosclerosis of coronary artery  I65.23 Bilateral carotid artery stenosis    IMPRESSION:  - Recent echocardiogram shows EF of 50-55% and pulmonary artery systolic pressure of 58 mmHg.  - Recent mini-stroke event in May noted, with inability to administer thrombolysis due to  Eliquis use.  - Continued Eliquis 5 mg twice daily despite one missed dose prior to stroke event.  - Ongoing need for regular dental cleanings determined despite discomfort with new dentist.    CHRONIC ATRIAL FIBRILLATION:  - Continued Eliquis 5 mg twice daily despite one missed dose prior to stroke event.        Chronic diastolic heart failure  Comments:  Appears stable on current medications   Intolerant of SG LT 2 inhibitor therapy    Essential hypertension  Comments:  Well controlled    Pulmonary hypertension  Comments:  Asymptomatic at present    S/P TAVR (transcatheter aortic valve replacement)  Comments:  Continue endocarditis prophylaxis and dental cleaning    History of coronary artery bypass graft  Comments:  Asymptomatic    Chronic atrial fibrillation  Comments:  Continue anticoagulation    Arteriosclerosis of coronary artery    Bilateral carotid artery stenosis  Comments:  Asymptomatic currently    Embolic stroke involving right middle cerebral artery  Comments:  Recent stroke, probably embolic.  Continue Eliquis.        Follow up in about 6 months (around 1/23/2026).        Future Appointments   Date Time Provider Department Center   1/23/2026  8:40 AM Alexei Langley Jr., MD [facility] [place]       This note was generated with the assistance of ambient listening technology. Verbal consent was obtained by the patient and accompanying visitor(s) for the recording of patient appointment to facilitate this note. I attest to having reviewed and edited the generated note for accuracy, though some syntax or spelling errors may persist. Please contact the author of this note for any clarification.                      [1]   Patient Active Problem List  Diagnosis    Neuropathy    Hernia of abdominal wall    Essential hypertension    Other constipation    Carotid artery disease    Nonrheumatic aortic valve stenosis    Chronic fatigue    Atherosclerosis of aorta    History of syncope    Risk for falls     Sensorineural hearing loss (SNHL) of both ears    Chronic atrial fibrillation    Arteriosclerosis of coronary artery    Pulmonary hypertension    History of left common carotid artery stent placement    S/P TAVR (transcatheter aortic valve replacement)    History of coronary artery bypass graft    Anticoagulation management encounter    Orthostatic hypotension    Pure hypercholesterolemia    Statin intolerance    Acquired hypothyroidism    Chronic diastolic heart failure    Iron deficiency    Overweight    Hemiplegia affecting right dominant side, unspecified etiology, unspecified hemiplegia type    Cardiac asthma    Thrombocytopenia    Embolic stroke involving right middle cerebral artery    Hypercoagulability due to atrial fibrillation    Chronic anticoagulation    Chronic systolic heart failure   [2]   Current Outpatient Medications:     acetaminophen (TYLENOL) 500 MG tablet, Take 500 mg by mouth as needed., Disp: , Rfl:     amLODIPine (NORVASC) 2.5 MG tablet, Take 1 tablet (2.5 mg total) by mouth every evening., Disp: 30 tablet, Rfl: 11    apixaban (ELIQUIS) 5 mg Tab, TAKE ONE BY MOUTH TWICE DAILY, Disp: 60 tablet, Rfl: 11    ergocalciferol (VITAMIN D2) 50,000 unit Cap, TAKE 1 CAPSULE (50,000 UNITS TOTAL) BY MOUTH EVERY 7 DAYS., Disp: 12 capsule, Rfl: 0    evolocumab (REPATHA SURECLICK) 140 mg/mL PnIj, Inject 1 mL (140 mg total) into the skin every 14 (fourteen) days., Disp: 6 mL, Rfl: 3    inhalation spacing device, Use as directed for inhalation., Disp: 1 each, Rfl: 0    levalbuterol (XOPENEX HFA) 45 mcg/actuation inhaler, Inhale 1-2 puffs into the lungs every 4 (four) hours as needed for Wheezing. Rescue, Disp: 15 g, Rfl: 11    levothyroxine (SYNTHROID) 25 MCG tablet, TAKE ONE DAILY BEFORE BREAKFAST, Disp: 90 tablet, Rfl: 3    metoprolol succinate (TOPROL-XL) 50 MG 24 hr tablet, Take 1 tablet (50 mg total) by mouth every evening., Disp: 90 tablet, Rfl: 3    nystatin-triamcinolone (MYCOLOG) ointment, APPLY TO  AFFECTED AREA TWICE DAILY AS NEEDED, Disp: 60 g, Rfl: 1    furosemide (LASIX) 20 MG tablet, Take 1 tablet (20 mg total) by mouth as needed (Swelling)., Disp: 30 tablet, Rfl: 11    levalbuterol (XOPENEX) 1.25 mg/3 mL nebulizer solution, Take 3 mLs (1.25 mg total) by nebulization every 4 (four) hours as needed for Wheezing. Rescue, Disp: 90 each, Rfl: 11    spironolactone (ALDACTONE) 25 MG tablet, Take 1 tablet (25 mg total) by mouth once daily., Disp: 90 tablet, Rfl: 3

## 2025-07-29 RX ORDER — ERGOCALCIFEROL 1.25 MG/1
50000 CAPSULE ORAL WEEKLY
Qty: 12 CAPSULE | Refills: 3 | Status: SHIPPED | OUTPATIENT
Start: 2025-07-29

## 2025-07-29 NOTE — TELEPHONE ENCOUNTER
Care Due:                  Date            Visit Type   Department     Provider  --------------------------------------------------------------------------------                                [facility] INTERNAL  Last Visit: 05-      FOLLOW UP    MEDICINE       Milo Hogan  Next Visit: None Scheduled  None         None Found                                                            Last  Test          Frequency    Reason                     Performed    Due Date  --------------------------------------------------------------------------------    Vitamin D...  12 months..  ergocalciferol...........  Not Found    Overdue    [employer] Embedded Care Due Messages. Reference number: [id number].   7/29/2025 10:43:04 AM CDT   Lay (psychotherapist)

## 2025-07-31 ENCOUNTER — DOCUMENT SCAN (OUTPATIENT)
Dept: HOME HEALTH SERVICES | Facility: HOSPITAL | Age: OVER 89
End: 2025-07-31
Payer: MEDICARE

## 2025-07-31 ENCOUNTER — PATIENT MESSAGE (OUTPATIENT)
Dept: CARDIOLOGY | Facility: CLINIC | Age: OVER 89
End: 2025-07-31
Payer: MEDICARE

## 2025-07-31 DIAGNOSIS — Z29.89 INDICATION PRESENT FOR ENDOCARDITIS PROPHYLAXIS: ICD-10-CM

## 2025-07-31 RX ORDER — AMOXICILLIN 250 MG/5ML
2000 POWDER, FOR SUSPENSION ORAL
Qty: 40 ML | Refills: 11 | Status: SHIPPED | OUTPATIENT
Start: 2025-07-31

## 2025-08-01 ENCOUNTER — PATIENT MESSAGE (OUTPATIENT)
Dept: ADMINISTRATIVE | Facility: OTHER | Age: OVER 89
End: 2025-08-01
Payer: MEDICARE

## 2025-08-11 ENCOUNTER — EXTERNAL HOME HEALTH (OUTPATIENT)
Dept: HOME HEALTH SERVICES | Facility: HOSPITAL | Age: OVER 89
End: 2025-08-11
Payer: MEDICARE

## 2025-08-27 ENCOUNTER — PATIENT MESSAGE (OUTPATIENT)
Dept: ADMINISTRATIVE | Facility: OTHER | Age: OVER 89
End: 2025-08-27
Payer: MEDICARE